# Patient Record
Sex: FEMALE | Race: WHITE | NOT HISPANIC OR LATINO
[De-identification: names, ages, dates, MRNs, and addresses within clinical notes are randomized per-mention and may not be internally consistent; named-entity substitution may affect disease eponyms.]

---

## 2017-08-29 ENCOUNTER — APPOINTMENT (OUTPATIENT)
Dept: OBGYN | Facility: CLINIC | Age: 57
End: 2017-08-29
Payer: COMMERCIAL

## 2017-08-29 PROCEDURE — 76830 TRANSVAGINAL US NON-OB: CPT

## 2017-08-31 ENCOUNTER — APPOINTMENT (OUTPATIENT)
Dept: OBGYN | Facility: CLINIC | Age: 57
End: 2017-08-31
Payer: COMMERCIAL

## 2017-08-31 VITALS
WEIGHT: 160 LBS | DIASTOLIC BLOOD PRESSURE: 82 MMHG | RESPIRATION RATE: 16 BRPM | BODY MASS INDEX: 30.21 KG/M2 | HEART RATE: 73 BPM | SYSTOLIC BLOOD PRESSURE: 118 MMHG | HEIGHT: 61 IN | OXYGEN SATURATION: 97 %

## 2017-08-31 DIAGNOSIS — N95.2 POSTMENOPAUSAL ATROPHIC VAGINITIS: ICD-10-CM

## 2017-08-31 DIAGNOSIS — Z80.0 FAMILY HISTORY OF MALIGNANT NEOPLASM OF DIGESTIVE ORGANS: ICD-10-CM

## 2017-08-31 DIAGNOSIS — Z13.820 ENCOUNTER FOR SCREENING FOR OSTEOPOROSIS: ICD-10-CM

## 2017-08-31 DIAGNOSIS — Z80.3 FAMILY HISTORY OF MALIGNANT NEOPLASM OF BREAST: ICD-10-CM

## 2017-08-31 PROCEDURE — 99396 PREV VISIT EST AGE 40-64: CPT

## 2017-09-01 ENCOUNTER — APPOINTMENT (OUTPATIENT)
Dept: OBGYN | Facility: CLINIC | Age: 57
End: 2017-09-01

## 2017-09-01 LAB — HPV HIGH+LOW RISK DNA PNL CVX: NEGATIVE

## 2017-09-07 LAB — CYTOLOGY CVX/VAG DOC THIN PREP: NORMAL

## 2018-07-28 ENCOUNTER — MOBILE ON CALL (OUTPATIENT)
Age: 58
End: 2018-07-28

## 2018-07-28 ENCOUNTER — EMERGENCY (EMERGENCY)
Facility: HOSPITAL | Age: 58
LOS: 1 days | Discharge: HOME | End: 2018-07-28
Attending: EMERGENCY MEDICINE
Payer: COMMERCIAL

## 2018-07-28 VITALS
WEIGHT: 145.06 LBS | TEMPERATURE: 98 F | OXYGEN SATURATION: 97 % | RESPIRATION RATE: 22 BRPM | HEIGHT: 61 IN | DIASTOLIC BLOOD PRESSURE: 90 MMHG | HEART RATE: 74 BPM | SYSTOLIC BLOOD PRESSURE: 138 MMHG

## 2018-07-28 LAB
ALBUMIN SERPL ELPH-MCNC: 4.4 G/DL — SIGNIFICANT CHANGE UP (ref 3.3–5)
ALP SERPL-CCNC: 61 U/L — SIGNIFICANT CHANGE UP (ref 40–120)
ALT FLD-CCNC: 33 U/L — SIGNIFICANT CHANGE UP (ref 10–45)
ANION GAP SERPL CALC-SCNC: 12 MMOL/L — SIGNIFICANT CHANGE UP (ref 5–17)
APPEARANCE UR: CLEAR — SIGNIFICANT CHANGE UP
AST SERPL-CCNC: 45 U/L — HIGH (ref 10–40)
BASOPHILS # BLD AUTO: 0 K/UL — SIGNIFICANT CHANGE UP (ref 0–0.2)
BASOPHILS NFR BLD AUTO: 0.3 % — SIGNIFICANT CHANGE UP (ref 0–2)
BILIRUB SERPL-MCNC: 0.5 MG/DL — SIGNIFICANT CHANGE UP (ref 0.2–1.2)
BILIRUB UR-MCNC: NEGATIVE — SIGNIFICANT CHANGE UP
BUN SERPL-MCNC: 14 MG/DL — SIGNIFICANT CHANGE UP (ref 7–23)
CALCIUM SERPL-MCNC: 9 MG/DL — SIGNIFICANT CHANGE UP (ref 8.4–10.5)
CHLORIDE SERPL-SCNC: 102 MMOL/L — SIGNIFICANT CHANGE UP (ref 96–108)
CO2 SERPL-SCNC: 23 MMOL/L — SIGNIFICANT CHANGE UP (ref 22–31)
COLOR SPEC: COLORLESS — SIGNIFICANT CHANGE UP
CREAT SERPL-MCNC: 0.73 MG/DL — SIGNIFICANT CHANGE UP (ref 0.5–1.3)
DIFF PNL FLD: NEGATIVE — SIGNIFICANT CHANGE UP
EOSINOPHIL # BLD AUTO: 0.1 K/UL — SIGNIFICANT CHANGE UP (ref 0–0.5)
EOSINOPHIL NFR BLD AUTO: 1.6 % — SIGNIFICANT CHANGE UP (ref 0–6)
EPI CELLS # UR: SIGNIFICANT CHANGE UP /HPF
GLUCOSE SERPL-MCNC: 105 MG/DL — HIGH (ref 70–99)
GLUCOSE UR QL: NEGATIVE — SIGNIFICANT CHANGE UP
HCT VFR BLD CALC: 38.9 % — SIGNIFICANT CHANGE UP (ref 34.5–45)
HGB BLD-MCNC: 12.1 G/DL — SIGNIFICANT CHANGE UP (ref 11.5–15.5)
KETONES UR-MCNC: NEGATIVE — SIGNIFICANT CHANGE UP
LEUKOCYTE ESTERASE UR-ACNC: NEGATIVE — SIGNIFICANT CHANGE UP
LIDOCAIN IGE QN: 24 U/L — SIGNIFICANT CHANGE UP (ref 7–60)
LYMPHOCYTES # BLD AUTO: 1.5 K/UL — SIGNIFICANT CHANGE UP (ref 1–3.3)
LYMPHOCYTES # BLD AUTO: 23.8 % — SIGNIFICANT CHANGE UP (ref 13–44)
MCHC RBC-ENTMCNC: 20 PG — LOW (ref 27–34)
MCHC RBC-ENTMCNC: 31 GM/DL — LOW (ref 32–36)
MCV RBC AUTO: 64.4 FL — LOW (ref 80–100)
MONOCYTES # BLD AUTO: 0.4 K/UL — SIGNIFICANT CHANGE UP (ref 0–0.9)
MONOCYTES NFR BLD AUTO: 6.1 % — SIGNIFICANT CHANGE UP (ref 2–14)
NEUTROPHILS # BLD AUTO: 4.2 K/UL — SIGNIFICANT CHANGE UP (ref 1.8–7.4)
NEUTROPHILS NFR BLD AUTO: 68.2 % — SIGNIFICANT CHANGE UP (ref 43–77)
NITRITE UR-MCNC: NEGATIVE — SIGNIFICANT CHANGE UP
PH UR: 7 — SIGNIFICANT CHANGE UP (ref 5–8)
PLATELET # BLD AUTO: 195 K/UL — SIGNIFICANT CHANGE UP (ref 150–400)
POTASSIUM SERPL-MCNC: 4.9 MMOL/L — SIGNIFICANT CHANGE UP (ref 3.5–5.3)
POTASSIUM SERPL-SCNC: 4.9 MMOL/L — SIGNIFICANT CHANGE UP (ref 3.5–5.3)
PROT SERPL-MCNC: 6.7 G/DL — SIGNIFICANT CHANGE UP (ref 6–8.3)
PROT UR-MCNC: NEGATIVE — SIGNIFICANT CHANGE UP
RBC # BLD: 6.03 M/UL — HIGH (ref 3.8–5.2)
RBC # FLD: 14.2 % — SIGNIFICANT CHANGE UP (ref 10.3–14.5)
RBC CASTS # UR COMP ASSIST: SIGNIFICANT CHANGE UP /HPF (ref 0–2)
SODIUM SERPL-SCNC: 137 MMOL/L — SIGNIFICANT CHANGE UP (ref 135–145)
SP GR SPEC: 1.01 — LOW (ref 1.01–1.02)
UROBILINOGEN FLD QL: NEGATIVE — SIGNIFICANT CHANGE UP
WBC # BLD: 6.2 K/UL — SIGNIFICANT CHANGE UP (ref 3.8–10.5)
WBC # FLD AUTO: 6.2 K/UL — SIGNIFICANT CHANGE UP (ref 3.8–10.5)
WBC UR QL: SIGNIFICANT CHANGE UP /HPF (ref 0–5)

## 2018-07-28 PROCEDURE — 99218: CPT

## 2018-07-28 PROCEDURE — 74176 CT ABD & PELVIS W/O CONTRAST: CPT | Mod: 26

## 2018-07-28 RX ORDER — SODIUM CHLORIDE 9 MG/ML
1000 INJECTION, SOLUTION INTRAVENOUS ONCE
Qty: 0 | Refills: 0 | Status: COMPLETED | OUTPATIENT
Start: 2018-07-28 | End: 2018-07-28

## 2018-07-28 RX ORDER — SODIUM CHLORIDE 9 MG/ML
1000 INJECTION, SOLUTION INTRAVENOUS
Qty: 0 | Refills: 0 | Status: DISCONTINUED | OUTPATIENT
Start: 2018-07-28 | End: 2018-08-01

## 2018-07-28 RX ORDER — ONDANSETRON 8 MG/1
4 TABLET, FILM COATED ORAL ONCE
Qty: 0 | Refills: 0 | Status: COMPLETED | OUTPATIENT
Start: 2018-07-28 | End: 2018-07-28

## 2018-07-28 RX ORDER — OXYCODONE HYDROCHLORIDE 5 MG/1
10 TABLET ORAL ONCE
Qty: 0 | Refills: 0 | Status: DISCONTINUED | OUTPATIENT
Start: 2018-07-28 | End: 2018-07-28

## 2018-07-28 RX ORDER — ONDANSETRON 8 MG/1
4 TABLET, FILM COATED ORAL EVERY 6 HOURS
Qty: 0 | Refills: 0 | Status: DISCONTINUED | OUTPATIENT
Start: 2018-07-28 | End: 2018-07-28

## 2018-07-28 RX ORDER — METOCLOPRAMIDE HCL 10 MG
10 TABLET ORAL EVERY 6 HOURS
Qty: 0 | Refills: 0 | Status: DISCONTINUED | OUTPATIENT
Start: 2018-07-28 | End: 2018-08-01

## 2018-07-28 RX ORDER — ACETAMINOPHEN 500 MG
1000 TABLET ORAL ONCE
Qty: 0 | Refills: 0 | Status: COMPLETED | OUTPATIENT
Start: 2018-07-28 | End: 2018-07-28

## 2018-07-28 RX ORDER — CEFTRIAXONE 500 MG/1
1 INJECTION, POWDER, FOR SOLUTION INTRAMUSCULAR; INTRAVENOUS ONCE
Qty: 0 | Refills: 0 | Status: COMPLETED | OUTPATIENT
Start: 2018-07-28 | End: 2018-07-29

## 2018-07-28 RX ORDER — SODIUM CHLORIDE 9 MG/ML
1000 INJECTION INTRAMUSCULAR; INTRAVENOUS; SUBCUTANEOUS
Qty: 0 | Refills: 0 | Status: DISCONTINUED | OUTPATIENT
Start: 2018-07-28 | End: 2018-07-29

## 2018-07-28 RX ORDER — ONDANSETRON 8 MG/1
4 TABLET, FILM COATED ORAL EVERY 6 HOURS
Qty: 0 | Refills: 0 | Status: DISCONTINUED | OUTPATIENT
Start: 2018-07-28 | End: 2018-08-01

## 2018-07-28 RX ORDER — CEFTRIAXONE 500 MG/1
1 INJECTION, POWDER, FOR SOLUTION INTRAMUSCULAR; INTRAVENOUS ONCE
Qty: 0 | Refills: 0 | Status: COMPLETED | OUTPATIENT
Start: 2018-07-28 | End: 2018-07-28

## 2018-07-28 RX ADMIN — SODIUM CHLORIDE 125 MILLILITER(S): 9 INJECTION, SOLUTION INTRAVENOUS at 21:13

## 2018-07-28 RX ADMIN — Medication 1 ENEMA: at 09:39

## 2018-07-28 RX ADMIN — Medication 1000 MILLIGRAM(S): at 10:34

## 2018-07-28 RX ADMIN — Medication 20 MILLIGRAM(S): at 10:03

## 2018-07-28 RX ADMIN — ONDANSETRON 4 MILLIGRAM(S): 8 TABLET, FILM COATED ORAL at 10:34

## 2018-07-28 RX ADMIN — SODIUM CHLORIDE 125 MILLILITER(S): 9 INJECTION, SOLUTION INTRAVENOUS at 13:24

## 2018-07-28 RX ADMIN — ONDANSETRON 4 MILLIGRAM(S): 8 TABLET, FILM COATED ORAL at 13:06

## 2018-07-28 RX ADMIN — SODIUM CHLORIDE 125 MILLILITER(S): 9 INJECTION, SOLUTION INTRAVENOUS at 13:06

## 2018-07-28 RX ADMIN — CEFTRIAXONE 100 GRAM(S): 500 INJECTION, POWDER, FOR SOLUTION INTRAMUSCULAR; INTRAVENOUS at 13:06

## 2018-07-28 RX ADMIN — Medication 1000 MILLIGRAM(S): at 09:32

## 2018-07-28 RX ADMIN — SODIUM CHLORIDE 1000 MILLILITER(S): 9 INJECTION, SOLUTION INTRAVENOUS at 09:32

## 2018-07-28 RX ADMIN — SODIUM CHLORIDE 125 MILLILITER(S): 9 INJECTION, SOLUTION INTRAVENOUS at 13:44

## 2018-07-28 RX ADMIN — ONDANSETRON 4 MILLIGRAM(S): 8 TABLET, FILM COATED ORAL at 19:48

## 2018-07-28 RX ADMIN — Medication 400 MILLIGRAM(S): at 09:32

## 2018-07-28 RX ADMIN — Medication 10 MILLIGRAM(S): at 21:25

## 2018-07-28 RX ADMIN — Medication 10 MILLIGRAM(S): at 19:52

## 2018-07-28 RX ADMIN — CEFTRIAXONE 1 GRAM(S): 500 INJECTION, POWDER, FOR SOLUTION INTRAMUSCULAR; INTRAVENOUS at 13:44

## 2018-07-28 NOTE — ED CDU PROVIDER INITIAL DAY NOTE - OBJECTIVE STATEMENT
56 yo female with PMH of nephrolithiasis, IBS, anxiety seen for abdominal pain and constipation x 4 days, with bloating/gas pain with associated nausea/vomiting 3 weeks, and back pain x 3 weeks. She is most concerned about abdominal pain and constipation, seen Dr. Minor for IBS, was instructed to take miralax daily, and fleet enema this morning which did not relieve constipation or abd pain and came in for worsening abd pain. She has had this abd  pain before, is like her usual IBS pain but more intense in nature, 9/1. Abdominal pain is RUQ  constant, nonradiating, has not been relieved with tylenol/ibuprofen, worse with food intake, but pt can tolerate po liquids well. She notes last episode of emesis this am, takes zofran prescribed by GI which relieves nausea. Of note, patient also reports dysuria x 2 weeks and recent treatment for UTI with Levaquin. No fevers/chills. Has f/u with GI on 8/2.    In ED, VSS. Labs unremarkable. UA negative. CT a/p revealing concentric distal rectal wall thickening with associated mild hazy perirectal fat. Findings are suggestive of proctitis, likely due to frequent enema use. No hydro, no stones. Guiac negative. Patient given enema in ED with some relief of constipation. Pt given ceftriaxone IV x1, bentyl, and zofran. Patient sent to CDU for IVF, IV abx, antiemetics, pain control, and re-evaluation.     PCP Nciole (Ames) 56 yo female with PMH of nephrolithiasis, IBS, anxiety seen for abdominal pain and constipation x 4 days, with bloating/gas pain with associated nausea/vomiting 3 weeks, and back pain x 3 weeks. She is most concerned about abdominal pain and constipation, seen Dr. Minor for IBS, was instructed to take miralax daily, and fleet enema this morning which did not relieve constipation or abd pain and came in for worsening abd pain. She has had this abd  pain before, is like her usual IBS pain but more intense in nature, 9/10. Abdominal pain is RUQ  constant, nonradiating, has not been relieved with tylenol/ibuprofen, worse with food intake, but pt can tolerate po liquids well. She notes last episode of emesis this am, takes zofran prescribed by GI which relieves nausea. Of note, patient also reports dysuria x 2 weeks and recent treatment for UTI with Levaquin. No fevers/chills. Has f/u with GI on 8/2.    In ED, VSS. Labs unremarkable. UA negative. CT a/p revealing concentric distal rectal wall thickening with associated mild hazy perirectal fat. Findings are suggestive of proctitis, likely due to frequent enema use. No hydro, no stones. Guiac negative. Patient given enema in ED with some relief of constipation. Pt given ceftriaxone IV x1, bentyl, and zofran. Patient sent to CDU for IVF, IV abx, antiemetics, pain control, and re-evaluation.     PCP Nicole (Lanse)

## 2018-07-28 NOTE — ED PROVIDER NOTE - ATTENDING CONTRIBUTION TO CARE
Attending MD Alcantar:   I personally have seen and examined this patient.  Physician assistant note reviewed and agree on plan of care and except where noted.  See HPI, PE, and MDM for details.

## 2018-07-28 NOTE — ED CDU PROVIDER INITIAL DAY NOTE - DETAILS
Dysuria and back pain; ?Pyelonephritis  -IV ABX  -IVF  -PAIN/FEVER CONTROL  -FAN TOSCANO  -CASE D/W ATTENDING LEONARDA Dysuria and back pain; ?Pyelonephritis  -IV ABX  -IVF  -PAIN CONTROL  -NAUSEA CONTROL  -FREQ EVAL  -CASE D/W ATTENDING LEONARDA

## 2018-07-28 NOTE — ED PROVIDER NOTE - MEDICAL DECISION MAKING DETAILS
Attending MD Alcantar: 57F with IBS presenting with 2-3 weeks of daily nausea, abd pain, constipation. Passing flatus, doubt SBO, colitis or acute bowel pathology. Overall suspect IBS related symptoms, will attempt symptom control with IV fluids, bentyl, enema for constipation. Will consider CT a/p if patient does not improve with these measures and upon serial examinations

## 2018-07-28 NOTE — ED ADULT NURSE NOTE - OBJECTIVE STATEMENT
57 yr old female to ed c/o spasmodic abd pain has h/o IBS and has not had BM since Thursday. Awake alert and orientedx3. Resp even and nonlab Denies chest pain or SOB.

## 2018-07-28 NOTE — ED PROVIDER NOTE - OBJECTIVE STATEMENT
58 yo female with PMH of IBS, anxiety seen for abdominal pain and constipation x 4 days, with bloating/gas pain with associated nausea/vomiting 3 weeks, and back pain x 3 weeks. She is most concerned about abdominal pain and constipation, seed Dr. Minor for IBS, was instructed to take miralax daily, and fleet enema this morning which did not relieve constipation or abd pain and came in for worsening abd pain. She has had this abd  pain before, is like her usual IBS pain but more intense in nature, 9/1. Abdominal pain is RUQ  constant, nonradiating, has not been relieved with tylenol/ibuprofen, worse with food intake, but pt can tolerate po liquids well. She notes last episode of emesis this am, takes zofran prescribed by GI which relieves nausea. 58 yo female with PMH of IBS, anxiety seen for abdominal pain and constipation x 4 days, with bloating/gas pain with associated nausea/vomiting 3 weeks, and back pain x 3 weeks. She is most concerned about abdominal pain and constipation, seed Dr. Minor for IBS, was instructed to take miralax daily, and fleet enema this morning which did not relieve constipation or abd pain and came in for worsening abd pain. She has had this abd  pain before, is like her usual IBS pain but more intense in nature, 9/1. Abdominal pain is RUQ  constant, nonradiating, has not been relieved with tylenol/ibuprofen, worse with food intake, but pt can tolerate po liquids well. She notes last episode of emesis this am, takes zofran prescribed by GI which relieves nausea. no fevers/chills.

## 2018-07-28 NOTE — ED CDU PROVIDER INITIAL DAY NOTE - PROGRESS NOTE DETAILS
Patient asleep, resting comfortably. NAD. VSS. JOSH Multani: Patient stated she has epigastric cramping abdominal pain. Will provide Dicyclomine and reassess JOSH Multani: Reassessed patient and reported abdominal pain now improved however does feel slightly nauseous. Will provide zofran

## 2018-07-28 NOTE — ED PROVIDER NOTE - PHYSICAL EXAMINATION
Attending MD Alcantar: A & O x 3, appears uncomfortable, tearful, EOMI b/l, PERRL b/l; lungs CTAB, heart with reg rhythm without murmur; abdomen soft, moderate distention, mild ttp diffusely however distractible ttp, no rebound or guarding; extremities with no edema; affect appropriate. neuro exam non focal with no motor or sensory deficits.

## 2018-07-28 NOTE — ED PROVIDER NOTE - PROGRESS NOTE DETAILS
Attending MD Alcantar: patient still with right back and groin pain, +nausea. Benign abdomen. consider ureteral colic now vs. pyelo. Plan for CT noncon to eval, UA given and pending. JOSH Multani: Patient seen at bedside in NAD.  VSS.  Patient resting comfortably however does note right sided headache rated 4/10 in severity but patient denied neuro symptoms including slurred speech, weakness, photophobia, blurry vision, tinnitus. No events noted over tele. Will provided Tylenol [id number] and reassess JOSH Multani: MRI scheduled made team aware will be taking pt for MRI tonight

## 2018-07-29 ENCOUNTER — MOBILE ON CALL (OUTPATIENT)
Age: 58
End: 2018-07-29

## 2018-07-29 VITALS
SYSTOLIC BLOOD PRESSURE: 113 MMHG | HEART RATE: 63 BPM | DIASTOLIC BLOOD PRESSURE: 73 MMHG | OXYGEN SATURATION: 98 % | TEMPERATURE: 99 F | RESPIRATION RATE: 16 BRPM

## 2018-07-29 LAB
CULTURE RESULTS: NO GROWTH — SIGNIFICANT CHANGE UP
SPECIMEN SOURCE: SIGNIFICANT CHANGE UP

## 2018-07-29 PROCEDURE — 74176 CT ABD & PELVIS W/O CONTRAST: CPT

## 2018-07-29 PROCEDURE — 96376 TX/PRO/DX INJ SAME DRUG ADON: CPT

## 2018-07-29 PROCEDURE — 81001 URINALYSIS AUTO W/SCOPE: CPT

## 2018-07-29 PROCEDURE — 96366 THER/PROPH/DIAG IV INF ADDON: CPT

## 2018-07-29 PROCEDURE — 99217: CPT

## 2018-07-29 PROCEDURE — 99284 EMERGENCY DEPT VISIT MOD MDM: CPT | Mod: 25

## 2018-07-29 PROCEDURE — 96365 THER/PROPH/DIAG IV INF INIT: CPT

## 2018-07-29 PROCEDURE — 96367 TX/PROPH/DG ADDL SEQ IV INF: CPT

## 2018-07-29 PROCEDURE — 82272 OCCULT BLD FECES 1-3 TESTS: CPT

## 2018-07-29 PROCEDURE — 87086 URINE CULTURE/COLONY COUNT: CPT

## 2018-07-29 PROCEDURE — 83690 ASSAY OF LIPASE: CPT

## 2018-07-29 PROCEDURE — 85027 COMPLETE CBC AUTOMATED: CPT

## 2018-07-29 PROCEDURE — G0378: CPT

## 2018-07-29 PROCEDURE — 96375 TX/PRO/DX INJ NEW DRUG ADDON: CPT

## 2018-07-29 PROCEDURE — 80053 COMPREHEN METABOLIC PANEL: CPT

## 2018-07-29 RX ORDER — SIMETHICONE 80 MG/1
80 TABLET, CHEWABLE ORAL EVERY 12 HOURS
Qty: 0 | Refills: 0 | Status: DISCONTINUED | OUTPATIENT
Start: 2018-07-29 | End: 2018-08-01

## 2018-07-29 RX ORDER — ONDANSETRON 8 MG/1
1 TABLET, FILM COATED ORAL
Qty: 9 | Refills: 0 | OUTPATIENT
Start: 2018-07-29 | End: 2018-07-31

## 2018-07-29 RX ORDER — ACETAMINOPHEN 500 MG
975 TABLET ORAL ONCE
Qty: 0 | Refills: 0 | Status: COMPLETED | OUTPATIENT
Start: 2018-07-29 | End: 2018-07-29

## 2018-07-29 RX ORDER — CEFTRIAXONE 500 MG/1
1 INJECTION, POWDER, FOR SOLUTION INTRAMUSCULAR; INTRAVENOUS EVERY 12 HOURS
Qty: 0 | Refills: 0 | Status: DISCONTINUED | OUTPATIENT
Start: 2018-07-29 | End: 2018-08-01

## 2018-07-29 RX ORDER — CEFUROXIME AXETIL 250 MG
1 TABLET ORAL
Qty: 14 | Refills: 0 | OUTPATIENT
Start: 2018-07-29 | End: 2018-08-04

## 2018-07-29 RX ADMIN — SIMETHICONE 80 MILLIGRAM(S): 80 TABLET, CHEWABLE ORAL at 05:10

## 2018-07-29 RX ADMIN — CEFTRIAXONE 1 GRAM(S): 500 INJECTION, POWDER, FOR SOLUTION INTRAMUSCULAR; INTRAVENOUS at 02:05

## 2018-07-29 RX ADMIN — Medication 10 MILLIGRAM(S): at 09:33

## 2018-07-29 RX ADMIN — CEFTRIAXONE 100 GRAM(S): 500 INJECTION, POWDER, FOR SOLUTION INTRAMUSCULAR; INTRAVENOUS at 01:04

## 2018-07-29 RX ADMIN — Medication 975 MILLIGRAM(S): at 10:13

## 2018-07-29 RX ADMIN — SODIUM CHLORIDE 125 MILLILITER(S): 9 INJECTION, SOLUTION INTRAVENOUS at 05:10

## 2018-07-29 NOTE — ED CDU PROVIDER SUBSEQUENT DAY NOTE - PROGRESS NOTE DETAILS
JOSH Multani: Patient seen at bedside in NAD.  VSS.  Patient seen sleeping at the bedside JOSH Multani: Patient seen at bedside in NAD.  VSS.  Patient seen sleeping at the bedside. Patient has not been nauseous or vomiting overnight Patient seen and evaluated at bedside, NAD. Reports she is feeling better than yesterday. Reports some abd cramping that improved Patient seen and evaluated at bedside, NAD. Reports she is feeling better than yesterday. Reports some abd cramping that improved with bentyl yesterday. Tolerated fluids and pudding overnight. Also reports dysuria yesterday that has since resolved. Denies any fever/chills, n/v. VSS. Will continue to monitor, likely d/c home this AM. Patient evaluated by prasad Anne for d/c home. Will attempt to contact patient's GI per patient request regarding ED course. Will give short course bentyl rx. Spoke with Dr. Carlton. Ok with d/c plan, will f/u with patient in office. Attn- pt feeling better.  no fever. less abdo and back pain.  no n/v.  Pt tolerated PO well.  Pt to f/u with her  GI Dr. Carlton.  Pt stable for d/c.

## 2018-07-29 NOTE — ED CDU PROVIDER DISPOSITION NOTE - PLAN OF CARE
(1) You will need to follow-up with your PMD in 2-3 days for your pyelonephritis and bring copy of your results were given with you to bring to your appt. Also, follow up with your gastroenterologist for your IBS  (2) Be sure to review attached discharge paperwork.  (3) Drink plenty of fluids to stay hydrated and avoid skipping meals  (4) Continue your home medications as directed ALONG WITH your antibiotic _________________. Take Zofran every 6 hours as needed for nausea   (5) Return to ER for uncontrolled fever, severe pain, trouble keeping down fluids, spread of infection or any other concerns. (1) You will need to follow-up with your PMD in 2-3 days for further evaluation and bring copy of your results were given with you to bring to your appt. Also, follow up with your gastroenterologist, Dr. Carlton, for your IBS.  (2) Be sure to review attached discharge paperwork.  (3) Drink plenty of fluids to stay hydrated.  (4) Continue your home medications as directed ALONG WITH: your antibiotic Ceftin twice daily for 7 days. Take Zofran every 6 hours as needed for nausea. Take bentyl as needed up to three times daily for abdominal cramping, CAUTION constipation is a side effect*** Be sure to take Miralax to regulate bowel movements if taking.  (5) Return to ER for fever, severe/worsening pain, trouble keeping down fluids, difficulty urinating, or any other concerns.

## 2018-07-29 NOTE — ED ADULT NURSE REASSESSMENT NOTE - COMFORT CARE
plan of care explained
ambulated to bathroom/darkened lights/repositioned/warm blanket provided/plan of care explained

## 2018-07-29 NOTE — ED CDU PROVIDER DISPOSITION NOTE - CLINICAL COURSE
56 yo female with PMH of nephrolithiasis, IBS, anxiety seen for abdominal pain and constipation x 4 days, with bloating/gas pain with associated nausea/vomiting 3 weeks, and back pain x 3 weeks. She is most concerned about abdominal pain and constipation, seen Dr. Minor for IBS, was instructed to take miralax daily, and fleet enema this morning which did not relieve constipation or abd pain and came in for worsening abd pain. She has had this abd  pain before, is like her usual IBS pain but more intense in nature, 9/10. Abdominal pain is RUQ  constant, nonradiating, has not been relieved with tylenol/ibuprofen, worse with food intake, but pt can tolerate po liquids well. She notes last episode of emesis this am, takes zofran prescribed by GI which relieves nausea. Of note, patient also reports dysuria x 2 weeks and recent treatment for UTI with Levaquin. No fevers/chills. Has f/u with GI on 8/2.    	In ED, VSS. Labs unremarkable. UA negative. CT a/p revealing concentric distal rectal wall thickening with associated mild hazy perirectal fat. Findings are suggestive of proctitis, likely due to frequent enema use. No hydro, no stones. Guiac negative. Patient given enema in ED with some relief of constipation. Pt given ceftriaxone IV x1, bentyl, and zofran. Patient sent to CDU for IVF, IV abx, antiemetics, pain control, and re-evaluation. Patient remained stable while in CDU and was able to tolerate PO. Patient safe to be discharged home with PO abx and zofran 56 yo female with PMH of nephrolithiasis, IBS, anxiety seen for abdominal pain and constipation x 4 days, with bloating/gas pain with associated nausea/vomiting 3 weeks, and back pain x 3 weeks. She is most concerned about abdominal pain and constipation, seen Dr. Minor for IBS, was instructed to take miralax daily, and fleet enema this morning which did not relieve constipation or abd pain and came in for worsening abd pain. She has had this abd  pain before, is like her usual IBS pain but more intense in nature, 9/10. Abdominal pain is RUQ  constant, nonradiating, has not been relieved with tylenol/ibuprofen, worse with food intake, but pt can tolerate po liquids well. She notes last episode of emesis this am, takes zofran prescribed by GI which relieves nausea. Of note, patient also reports dysuria x 2 weeks and recent treatment for UTI with Levaquin. No fevers/chills. Has f/u with GI on 8/2.    	In ED, VSS. Labs unremarkable. UA negative. CT a/p revealing concentric distal rectal wall thickening with associated mild hazy perirectal fat. Findings are suggestive of proctitis, likely due to frequent enema use. No hydro, no stones. Guiac negative. Patient given enema in ED with some relief of constipation. Pt given ceftriaxone IV x1, bentyl, and zofran. Patient sent to CDU for IVF, IV abx, antiemetics, pain control, and re-evaluation. Patient remained stable while in CDU and was able to tolerate PO. Abdominal pain and dysuria improved. Patient safe to be discharged home with PO abx, zofran, and short course bentyl. 58 yo female with PMH of nephrolithiasis, IBS, anxiety seen for abdominal pain and constipation x 4 days, with bloating/gas pain with associated nausea/vomiting 3 weeks, and back pain x 3 weeks. She is most concerned about abdominal pain and constipation, seen Dr. Minor for IBS, was instructed to take miralax daily, and fleet enema this morning which did not relieve constipation or abd pain and came in for worsening abd pain. She has had this abd  pain before, is like her usual IBS pain but more intense in nature, 9/10. Abdominal pain is RUQ  constant, nonradiating, has not been relieved with tylenol/ibuprofen, worse with food intake, but pt can tolerate po liquids well. She notes last episode of emesis this am, takes zofran prescribed by GI which relieves nausea. Of note, patient also reports dysuria x 2 weeks and recent treatment for UTI with Levaquin. No fevers/chills. Has f/u with GI on 8/2.    	In ED, VSS. Labs unremarkable. UA negative. CT a/p revealing concentric distal rectal wall thickening with associated mild hazy perirectal fat. Findings are suggestive of proctitis, likely due to frequent enema use. No hydro, no stones. Guiac negative. Patient given enema in ED with some relief of constipation. Pt given ceftriaxone IV x1, bentyl, and zofran. Patient sent to CDU for IVF, IV abx, antiemetics, pain control, and re-evaluation. Patient remained stable while in CDU and was able to tolerate PO. Abdominal pain and dysuria improved. Patient safe to be discharged home with PO abx, zofran, and short course bentyl. Discussed with patient's GI.

## 2018-07-29 NOTE — ED ADULT NURSE REASSESSMENT NOTE - NS ED NURSE REASSESS COMMENT FT1
To cdu denies pain
Pt report received from Lois LAM. Pt transferred to cdu bed 6 for IV antibiotics for pylonephritis. Pt a/ox3 denies respiratory distress, sob, dyspnea, C/P, palpitations, n/v/d at this time. VSS, afebrile, IV clean/dry/intact. Pt aware of plan of care, call bell within reach ,safety maintained. Will monitor and assess.
on toilet. Small BM noted Zofran given for nausea.
report taken from Courtney LAM. pt to receive Ceftriaxone 1 gram at 1300. will continue to monitor.
Received pt from GENARO Jean Baptiste, received pt alert and responsive, oriented x4, denies any respiratory distress, SOB, or difficulty breathing. Pt transferred to CDU for nausea, vomiting and back pain, will medicate for pain as ordered as pt is c/o 7/10 pain to abdomin and back. Received pt with IV fluids infusing as ordered, pt tolerating well. IV in place, patent and free of signs of infiltration, pending IV Rocephin at 0100 pt aware. V/S stable, pt afebrile,  Pt educated on unit and unit rules, instructed patient to notify RN of any needed assistance, Pt verbalizes understanding, Call bell placed within reach. Safety maintained. Will continue to monitor.

## 2018-07-29 NOTE — ED ADULT NURSE REASSESSMENT NOTE - GENERAL PATIENT STATE
comfortable appearance/cooperative/improvement verbalized
smiling/interactive/comfortable appearance/cooperative/resting/sleeping

## 2018-07-30 PROBLEM — N95.2 POSTMENOPAUSAL ATROPHIC VAGINITIS: Status: ACTIVE | Noted: 2017-08-31

## 2018-08-02 ENCOUNTER — INPATIENT (INPATIENT)
Facility: HOSPITAL | Age: 58
LOS: 4 days | Discharge: HOME | DRG: 392 | End: 2018-08-07
Attending: INTERNAL MEDICINE | Admitting: INTERNAL MEDICINE
Payer: COMMERCIAL

## 2018-08-02 ENCOUNTER — APPOINTMENT (OUTPATIENT)
Dept: GASTROENTEROLOGY | Facility: CLINIC | Age: 58
End: 2018-08-02
Payer: COMMERCIAL

## 2018-08-02 VITALS
HEART RATE: 73 BPM | RESPIRATION RATE: 16 BRPM | OXYGEN SATURATION: 96 % | SYSTOLIC BLOOD PRESSURE: 109 MMHG | DIASTOLIC BLOOD PRESSURE: 75 MMHG

## 2018-08-02 VITALS
HEIGHT: 69 IN | BODY MASS INDEX: 25.03 KG/M2 | DIASTOLIC BLOOD PRESSURE: 79 MMHG | HEART RATE: 55 BPM | SYSTOLIC BLOOD PRESSURE: 110 MMHG | WEIGHT: 169 LBS

## 2018-08-02 DIAGNOSIS — K52.9 NONINFECTIVE GASTROENTERITIS AND COLITIS, UNSPECIFIED: ICD-10-CM

## 2018-08-02 DIAGNOSIS — Z83.71 FAMILY HISTORY OF COLONIC POLYPS: ICD-10-CM

## 2018-08-02 DIAGNOSIS — Z80.0 FAMILY HISTORY OF MALIGNANT NEOPLASM OF DIGESTIVE ORGANS: ICD-10-CM

## 2018-08-02 LAB
ALBUMIN SERPL ELPH-MCNC: 4.5 G/DL — SIGNIFICANT CHANGE UP (ref 3.3–5)
ALP SERPL-CCNC: 61 U/L — SIGNIFICANT CHANGE UP (ref 40–120)
ALT FLD-CCNC: 25 U/L — SIGNIFICANT CHANGE UP (ref 10–45)
ANION GAP SERPL CALC-SCNC: 15 MMOL/L — SIGNIFICANT CHANGE UP (ref 5–17)
APPEARANCE UR: CLEAR — SIGNIFICANT CHANGE UP
AST SERPL-CCNC: 24 U/L — SIGNIFICANT CHANGE UP (ref 10–40)
BACTERIA # UR AUTO: NEGATIVE /HPF — SIGNIFICANT CHANGE UP
BASOPHILS # BLD AUTO: 0 K/UL — SIGNIFICANT CHANGE UP (ref 0–0.2)
BASOPHILS NFR BLD AUTO: 0.6 % — SIGNIFICANT CHANGE UP (ref 0–2)
BILIRUB SERPL-MCNC: 0.5 MG/DL — SIGNIFICANT CHANGE UP (ref 0.2–1.2)
BILIRUB UR-MCNC: NEGATIVE — SIGNIFICANT CHANGE UP
BUN SERPL-MCNC: 14 MG/DL — SIGNIFICANT CHANGE UP (ref 7–23)
CALCIUM SERPL-MCNC: 9.6 MG/DL — SIGNIFICANT CHANGE UP (ref 8.4–10.5)
CHLORIDE SERPL-SCNC: 106 MMOL/L — SIGNIFICANT CHANGE UP (ref 96–108)
CO2 SERPL-SCNC: 23 MMOL/L — SIGNIFICANT CHANGE UP (ref 22–31)
COLOR SPEC: YELLOW — SIGNIFICANT CHANGE UP
CREAT SERPL-MCNC: 0.96 MG/DL — SIGNIFICANT CHANGE UP (ref 0.5–1.3)
DIFF PNL FLD: NEGATIVE — SIGNIFICANT CHANGE UP
EOSINOPHIL # BLD AUTO: 0.1 K/UL — SIGNIFICANT CHANGE UP (ref 0–0.5)
EOSINOPHIL NFR BLD AUTO: 0.9 % — SIGNIFICANT CHANGE UP (ref 0–6)
EPI CELLS # UR: NEGATIVE /HPF — SIGNIFICANT CHANGE UP
GAS PNL BLDV: SIGNIFICANT CHANGE UP
GLUCOSE SERPL-MCNC: 91 MG/DL — SIGNIFICANT CHANGE UP (ref 70–99)
GLUCOSE UR QL: NEGATIVE — SIGNIFICANT CHANGE UP
HCG UR QL: NEGATIVE — SIGNIFICANT CHANGE UP
HCT VFR BLD CALC: 37 % — SIGNIFICANT CHANGE UP (ref 34.5–45)
HGB BLD-MCNC: 12.1 G/DL — SIGNIFICANT CHANGE UP (ref 11.5–15.5)
KETONES UR-MCNC: NEGATIVE — SIGNIFICANT CHANGE UP
LEUKOCYTE ESTERASE UR-ACNC: NEGATIVE — SIGNIFICANT CHANGE UP
LYMPHOCYTES # BLD AUTO: 1.2 K/UL — SIGNIFICANT CHANGE UP (ref 1–3.3)
LYMPHOCYTES # BLD AUTO: 14.8 % — SIGNIFICANT CHANGE UP (ref 13–44)
MCHC RBC-ENTMCNC: 20.8 PG — LOW (ref 27–34)
MCHC RBC-ENTMCNC: 32.8 GM/DL — SIGNIFICANT CHANGE UP (ref 32–36)
MCV RBC AUTO: 63.2 FL — LOW (ref 80–100)
MONOCYTES # BLD AUTO: 0.4 K/UL — SIGNIFICANT CHANGE UP (ref 0–0.9)
MONOCYTES NFR BLD AUTO: 5 % — SIGNIFICANT CHANGE UP (ref 2–14)
NEUTROPHILS # BLD AUTO: 6.2 K/UL — SIGNIFICANT CHANGE UP (ref 1.8–7.4)
NEUTROPHILS NFR BLD AUTO: 78.7 % — HIGH (ref 43–77)
NITRITE UR-MCNC: NEGATIVE — SIGNIFICANT CHANGE UP
PH UR: 7 — SIGNIFICANT CHANGE UP (ref 5–8)
PLATELET # BLD AUTO: 220 K/UL — SIGNIFICANT CHANGE UP (ref 150–400)
POTASSIUM SERPL-MCNC: 4.1 MMOL/L — SIGNIFICANT CHANGE UP (ref 3.5–5.3)
POTASSIUM SERPL-SCNC: 4.1 MMOL/L — SIGNIFICANT CHANGE UP (ref 3.5–5.3)
PROT SERPL-MCNC: 7 G/DL — SIGNIFICANT CHANGE UP (ref 6–8.3)
PROT UR-MCNC: NEGATIVE — SIGNIFICANT CHANGE UP
RBC # BLD: 5.84 M/UL — HIGH (ref 3.8–5.2)
RBC # FLD: 13.4 % — SIGNIFICANT CHANGE UP (ref 10.3–14.5)
RBC CASTS # UR COMP ASSIST: SIGNIFICANT CHANGE UP /HPF (ref 0–2)
SODIUM SERPL-SCNC: 144 MMOL/L — SIGNIFICANT CHANGE UP (ref 135–145)
SP GR SPEC: 1.01 — LOW (ref 1.01–1.02)
UROBILINOGEN FLD QL: NEGATIVE — SIGNIFICANT CHANGE UP
WBC # BLD: 7.9 K/UL — SIGNIFICANT CHANGE UP (ref 3.8–10.5)
WBC # FLD AUTO: 7.9 K/UL — SIGNIFICANT CHANGE UP (ref 3.8–10.5)
WBC UR QL: SIGNIFICANT CHANGE UP /HPF (ref 0–5)

## 2018-08-02 PROCEDURE — 74177 CT ABD & PELVIS W/CONTRAST: CPT | Mod: 26

## 2018-08-02 PROCEDURE — 99215 OFFICE O/P EST HI 40 MIN: CPT

## 2018-08-02 PROCEDURE — 99285 EMERGENCY DEPT VISIT HI MDM: CPT

## 2018-08-02 PROCEDURE — 82274 ASSAY TEST FOR BLOOD FECAL: CPT | Mod: QW

## 2018-08-02 RX ORDER — SODIUM CHLORIDE 9 MG/ML
1000 INJECTION INTRAMUSCULAR; INTRAVENOUS; SUBCUTANEOUS
Qty: 0 | Refills: 0 | Status: DISCONTINUED | OUTPATIENT
Start: 2018-08-02 | End: 2018-08-07

## 2018-08-02 RX ORDER — GLYCERIN 1 %
1 DROPS OPHTHALMIC (EYE)
Qty: 0 | Refills: 0 | COMMUNITY

## 2018-08-02 RX ORDER — SENNA PLUS 8.6 MG/1
2 TABLET ORAL AT BEDTIME
Qty: 0 | Refills: 0 | Status: DISCONTINUED | OUTPATIENT
Start: 2018-08-02 | End: 2018-08-07

## 2018-08-02 RX ORDER — ONDANSETRON 8 MG/1
4 TABLET, FILM COATED ORAL ONCE
Qty: 0 | Refills: 0 | Status: COMPLETED | OUTPATIENT
Start: 2018-08-02 | End: 2018-08-02

## 2018-08-02 RX ORDER — SODIUM CHLORIDE 9 MG/ML
1000 INJECTION INTRAMUSCULAR; INTRAVENOUS; SUBCUTANEOUS ONCE
Qty: 0 | Refills: 0 | Status: COMPLETED | OUTPATIENT
Start: 2018-08-02 | End: 2018-08-02

## 2018-08-02 RX ORDER — GLYCERIN ADULT
1 SUPPOSITORY, RECTAL RECTAL
Qty: 0 | Refills: 0 | COMMUNITY

## 2018-08-02 RX ORDER — ONDANSETRON 8 MG/1
1 TABLET, FILM COATED ORAL
Qty: 0 | Refills: 0 | COMMUNITY

## 2018-08-02 RX ORDER — DOCUSATE SODIUM 100 MG
100 CAPSULE ORAL
Qty: 0 | Refills: 0 | Status: DISCONTINUED | OUTPATIENT
Start: 2018-08-02 | End: 2018-08-07

## 2018-08-02 RX ORDER — METRONIDAZOLE 500 MG
500 TABLET ORAL EVERY 8 HOURS
Qty: 0 | Refills: 0 | Status: DISCONTINUED | OUTPATIENT
Start: 2018-08-02 | End: 2018-08-03

## 2018-08-02 RX ORDER — MORPHINE SULFATE 50 MG/1
4 CAPSULE, EXTENDED RELEASE ORAL ONCE
Qty: 0 | Refills: 0 | Status: DISCONTINUED | OUTPATIENT
Start: 2018-08-02 | End: 2018-08-02

## 2018-08-02 RX ORDER — POLYETHYLENE GLYCOL 3350 17 G/17G
238 POWDER, FOR SOLUTION ORAL ONCE
Qty: 0 | Refills: 0 | Status: COMPLETED | OUTPATIENT
Start: 2018-08-02 | End: 2018-08-02

## 2018-08-02 RX ORDER — CIPROFLOXACIN LACTATE 400MG/40ML
400 VIAL (ML) INTRAVENOUS EVERY 12 HOURS
Qty: 0 | Refills: 0 | Status: DISCONTINUED | OUTPATIENT
Start: 2018-08-03 | End: 2018-08-03

## 2018-08-02 RX ORDER — POLYETHYLENE GLYCOL 3350 17 G/17G
17 POWDER, FOR SOLUTION ORAL
Qty: 0 | Refills: 0 | Status: DISCONTINUED | OUTPATIENT
Start: 2018-08-02 | End: 2018-08-07

## 2018-08-02 RX ORDER — SIMETHICONE 80 MG/1
80 TABLET, CHEWABLE ORAL ONCE
Qty: 0 | Refills: 0 | Status: COMPLETED | OUTPATIENT
Start: 2018-08-02 | End: 2018-08-02

## 2018-08-02 RX ORDER — METRONIDAZOLE 500 MG
500 TABLET ORAL ONCE
Qty: 0 | Refills: 0 | Status: COMPLETED | OUTPATIENT
Start: 2018-08-02 | End: 2018-08-02

## 2018-08-02 RX ORDER — SODIUM CHLORIDE 9 MG/ML
1000 INJECTION, SOLUTION INTRAVENOUS ONCE
Qty: 0 | Refills: 0 | Status: COMPLETED | OUTPATIENT
Start: 2018-08-02 | End: 2018-08-02

## 2018-08-02 RX ORDER — PANTOPRAZOLE SODIUM 20 MG/1
40 TABLET, DELAYED RELEASE ORAL
Qty: 0 | Refills: 0 | Status: DISCONTINUED | OUTPATIENT
Start: 2018-08-02 | End: 2018-08-07

## 2018-08-02 RX ORDER — ACETAMINOPHEN 500 MG
1000 TABLET ORAL ONCE
Qty: 0 | Refills: 0 | Status: DISCONTINUED | OUTPATIENT
Start: 2018-08-02 | End: 2018-08-03

## 2018-08-02 RX ORDER — LIDOCAINE 4 G/100G
1 CREAM TOPICAL THREE TIMES A DAY
Qty: 0 | Refills: 0 | Status: DISCONTINUED | OUTPATIENT
Start: 2018-08-02 | End: 2018-08-07

## 2018-08-02 RX ORDER — CIPROFLOXACIN LACTATE 400MG/40ML
400 VIAL (ML) INTRAVENOUS ONCE
Qty: 0 | Refills: 0 | Status: COMPLETED | OUTPATIENT
Start: 2018-08-02 | End: 2018-08-02

## 2018-08-02 RX ORDER — METRONIDAZOLE 500 MG
TABLET ORAL
Qty: 0 | Refills: 0 | Status: DISCONTINUED | OUTPATIENT
Start: 2018-08-02 | End: 2018-08-03

## 2018-08-02 RX ORDER — CIPROFLOXACIN LACTATE 400MG/40ML
VIAL (ML) INTRAVENOUS
Qty: 0 | Refills: 0 | Status: DISCONTINUED | OUTPATIENT
Start: 2018-08-02 | End: 2018-08-03

## 2018-08-02 RX ORDER — HEPARIN SODIUM 5000 [USP'U]/ML
5000 INJECTION INTRAVENOUS; SUBCUTANEOUS EVERY 12 HOURS
Qty: 0 | Refills: 0 | Status: DISCONTINUED | OUTPATIENT
Start: 2018-08-02 | End: 2018-08-07

## 2018-08-02 RX ADMIN — MORPHINE SULFATE 4 MILLIGRAM(S): 50 CAPSULE, EXTENDED RELEASE ORAL at 14:37

## 2018-08-02 RX ADMIN — MORPHINE SULFATE 4 MILLIGRAM(S): 50 CAPSULE, EXTENDED RELEASE ORAL at 15:10

## 2018-08-02 RX ADMIN — Medication 200 MILLIGRAM(S): at 18:23

## 2018-08-02 RX ADMIN — LIDOCAINE 1 APPLICATION(S): 4 CREAM TOPICAL at 22:37

## 2018-08-02 RX ADMIN — POLYETHYLENE GLYCOL 3350 238 GRAM(S): 17 POWDER, FOR SOLUTION ORAL at 20:40

## 2018-08-02 RX ADMIN — Medication 100 MILLIGRAM(S): at 21:51

## 2018-08-02 RX ADMIN — ONDANSETRON 4 MILLIGRAM(S): 8 TABLET, FILM COATED ORAL at 14:37

## 2018-08-02 RX ADMIN — SODIUM CHLORIDE 4000 MILLILITER(S): 9 INJECTION, SOLUTION INTRAVENOUS at 16:53

## 2018-08-02 RX ADMIN — SIMETHICONE 80 MILLIGRAM(S): 80 TABLET, CHEWABLE ORAL at 20:37

## 2018-08-02 RX ADMIN — Medication 30 MILLILITER(S): at 22:45

## 2018-08-02 RX ADMIN — Medication 100 MILLIGRAM(S): at 22:45

## 2018-08-02 RX ADMIN — SENNA PLUS 2 TABLET(S): 8.6 TABLET ORAL at 21:50

## 2018-08-02 RX ADMIN — SODIUM CHLORIDE 1000 MILLILITER(S): 9 INJECTION INTRAMUSCULAR; INTRAVENOUS; SUBCUTANEOUS at 14:37

## 2018-08-02 RX ADMIN — SODIUM CHLORIDE 100 MILLILITER(S): 9 INJECTION INTRAMUSCULAR; INTRAVENOUS; SUBCUTANEOUS at 18:23

## 2018-08-02 RX ADMIN — Medication 1 ENEMA: at 18:22

## 2018-08-02 RX ADMIN — HEPARIN SODIUM 5000 UNIT(S): 5000 INJECTION INTRAVENOUS; SUBCUTANEOUS at 21:50

## 2018-08-02 NOTE — ED PROVIDER NOTE - MEDICAL DECISION MAKING DETAILS
Pt is a 58 yo female with hx of IBS, gerd who P/W CC of abdominal pain. Has proctatis, has had no significant bowel movements since last saturday, concern for sterachoalitis will get CT A/P W/ IV contrast. Bladder scan found ~500 ml of urine, will do begum cath at this time. If CT A/P is negative, will attempt to manually disimpact.

## 2018-08-02 NOTE — ED ADULT NURSE NOTE - OBJECTIVE STATEMENT
57 year old female patient BIBA c/o abd pain, constipation, nausea and flank pain. Patient seen at this ED on 7/28 for similar complaints and went to CDU and had BM on Saturday with some relief of pain. Patient states today she was able to eat solid food and started having abd cramping and rectal pressure. Patient states she feels like she's constipated and feels like she has stool that will not pass and has periods of . Patient states she was sitting on the toilet and was 57 year old female patient BIBA c/o worsening lower abd pain, constipation, nausea, chills and flank pain x 3 weeks. Patient started on PO Levaquin by urologist for "kidney infection" and reports constipation since starting abx.  Patient seen at this ED on 7/28 for similar complaints and went to CDU and had BM on Saturday with some relief of pain. Patient states today she was able to eat solid food and started having abd cramping and rectal pressure. Patient states she feels like she's constipated and feels like she has stool that will not pass and notices some spots of blood in stool. Patient reports attempting tap water enema and glycerin suppository this morning with little relief of pain. Patient endorses R flank pain and nausea- took zofran this morning with little relief of nausea. Pt had one episode of vomiting prior to arrival. Pt also states she feels like she cannot urinate due to constipation - last able to void this morning. Pt reports burning at urethra. Pt sitting on toilet and unable to urinate or have BM. MD at bedside for evaluation Patient and family aware of plan of care.

## 2018-08-02 NOTE — PATIENT PROFILE ADULT. - NS TRANSFER PATIENT BELONGINGS
Cell Phone/PDA (specify)/Electronic Device (specify)/headphones, , earrings, ring/Jewelry/Money (specify)/Clothing

## 2018-08-02 NOTE — ED ADULT NURSE NOTE - NSIMPLEMENTINTERV_GEN_ALL_ED
Implemented All Universal Safety Interventions:  Golden to call system. Call bell, personal items and telephone within reach. Instruct patient to call for assistance. Room bathroom lighting operational. Non-slip footwear when patient is off stretcher. Physically safe environment: no spills, clutter or unnecessary equipment. Stretcher in lowest position, wheels locked, appropriate side rails in place.

## 2018-08-02 NOTE — ED PROVIDER NOTE - OBJECTIVE STATEMENT
Pt is a 56 yo female with hx of IBS, gerd who P/W CC of abdominal pain.    Pt initially went to PMD for vomiting and abdominal pain 3 wks ago and was given Levoquin PO for a "kidney infection". These sx remained the same and pt came to ER last saturday and a CT scan of abdomen showed proctatitis attributed due to frequent enemas being given for ~1 month of constipation. Pt was given IV abx and d/c w/ prescrption for PO abx. Pt took these as prescribed. Pt had no bowel movements between last saturday when she came to the ER and yesterday when she passed one small hard ball of stool. While straining on the toilet today, pt had onset of lower abdominal pain, causing her to call EMS and come to the emergency room. Has been urinating normally, no burning on urination. States she has had chills for the past 3 weeks.

## 2018-08-02 NOTE — ED PROVIDER NOTE - CARE PLAN
Assessment and plan of treatment:	Pt is a 58 yo female with hx of IBS, gerd who P/W CC of abdominal pain. Has proctatis, has had no significant bowel movements since last saturday, concern for sterachoalitis will get CT A/P W/ IV contrast. Bladder scan found ~500 ml of urine, will do begum cath at this time. If CT A/P is negative, will attempt to manually disimpact. Principal Discharge DX:	Colitis  Assessment and plan of treatment:	Pt is a 56 yo female with hx of IBS, gerd who P/W CC of abdominal pain. Has proctatis, has had no significant bowel movements since last saturday, concern for sterachoalitis will get CT A/P W/ IV contrast. Bladder scan found ~500 ml of urine, will do begum cath at this time. If CT A/P is negative, will attempt to manually disimpact.  Secondary Diagnosis:	Urinary retention Principal Discharge DX:	Colitis  Secondary Diagnosis:	Urinary retention

## 2018-08-02 NOTE — CONSULT NOTE ADULT - SUBJECTIVE AND OBJECTIVE BOX
Patient is a 57y old  Female who presents with a chief complaint of     HPI:  Pt is a 58 yo female with hx of IBS, gerd who P/W CC of abdominal pain.    	Pt initially went to PMD for vomiting and abdominal pain 3 wks ago and was given Levaquin PO for a  ? "kidney infection".   These sx remained the same and pt came to ER last saturday and a CT scan of abdomen showed proctitis attributed due to frequent enemas being given for ~1 month of constipation.   Pt was given IV abx and d/c w/ script for po abs   . Pt took these as prescribed. Pt had no bowel movements between last saturday when she came to the ER and yesterday when she passed one small hard ball of stool  . While straining on the toilet today, pt had onset of lower abdominal pain, causing her to call EMS and come to the emergency room.   Has been urinating normally, no burning on urination. (02 Aug 2018 18:20)      PAST MEDICAL & SURGICAL HISTORY:  Acid reflux  Gastritis  Panic attack  Anxiety  No significant past surgical history      MEDICATIONS  (STANDING):  ciprofloxacin   IVPB      docusate sodium 100 milliGRAM(s) Oral two times a day  heparin  Injectable 5000 Unit(s) SubCutaneous every 12 hours  metroNIDAZOLE  IVPB      metroNIDAZOLE  IVPB 500 milliGRAM(s) IV Intermittent once  metroNIDAZOLE  IVPB 500 milliGRAM(s) IV Intermittent every 8 hours  pantoprazole    Tablet 40 milliGRAM(s) Oral before breakfast  polyethylene glycol 3350 17 Gram(s) Oral two times a day  senna 2 Tablet(s) Oral at bedtime  sodium chloride 0.9%. 1000 milliLiter(s) (100 mL/Hr) IV Continuous <Continuous>      Allergies    Advil (Anaphylaxis)  ibuprofen (Hives)  lactose (Diarrhea)  sulfa drugs (Anaphylaxis)    Intolerances        SOCIAL HISTORY:  Denies ETOh,Smoking,     FAMILY HISTORY:      REVIEW OF SYSTEMS:    CONSTITUTIONAL: No weakness, fevers or chills  EYES/ENT: No visual changes;  No vertigo or throat pain   NECK: No pain or stiffness  RESPIRATORY: No cough, wheezing, hemoptysis; No shortness of breath  CARDIOVASCULAR: No chest pain or palpitations  GASTROINTESTINAL: No abdominal or epigastric pain. No nausea, vomiting, or hematemesis; No diarrhea or constipation. No melena or hematochezia.  GENITOURINARY: No dysuria, frequency or hematuria  NEUROLOGICAL: No numbness or weakness  SKIN: No itching, burning, rashes, or lesions   All other review of systems is negative unless indicated above.    VITAL:  T(C): , Max: 37.1 (08-02-18 @ 13:45)  T(F): , Max: 98.8 (08-02-18 @ 13:45)  HR: 65 (08-02-18 @ 16:48)  BP: 131/70 (08-02-18 @ 16:48)  BP(mean): --  RR: 16 (08-02-18 @ 16:48)  SpO2: 99% (08-02-18 @ 16:48)  Wt(kg): --    I and O's:    08-02 @ 07:01  -  08-02 @ 19:00  --------------------------------------------------------  IN: 0 mL / OUT: 1700 mL / NET: -1700 mL          PHYSICAL EXAM:    Constitutional: NAD  HEENT: PERRLA,   Neck: No JVD  Respiratory: CTA B/L  Cardiovascular: S1 and S2  Gastrointestinal: BS+, soft, NT/ND  Extremities: No peripheral edema  Neurological: A/O x 3, no focal deficits  Psychiatric: Normal mood, normal affect  : No Olvera  Skin: No rashes  Access: Not applicable  Back: No CVA tenderness    LABS:                        12.1   7.9   )-----------( 220      ( 02 Aug 2018 14:47 )             37.0     08-02    144  |  106  |  14  ----------------------------<  91  4.1   |  23  |  0.96    Ca    9.6      02 Aug 2018 14:47    TPro  7.0  /  Alb  4.5  /  TBili  0.5  /  DBili  x   /  AST  24  /  ALT  25  /  AlkPhos  61  08-02          RADIOLOGY & ADDITIONAL STUDIES:

## 2018-08-02 NOTE — ED PROVIDER NOTE - ABDOMINAL TENDER
left upper quadrant/right lower quadrant/periumbilical/right upper quadrant/left lower quadrant/umbilical/epigastric

## 2018-08-02 NOTE — ED PROVIDER NOTE - PROGRESS NOTE DETAILS
Attending MD Alcantar: pt received in sign out. CT read notable for stool impaction and ?stercoral colitis. Given recurrent ED visits for same and urinary retention, will admit for IV abx for colitis, aggressive bowel regiment

## 2018-08-02 NOTE — ED PROVIDER NOTE - ATTENDING CONTRIBUTION TO CARE
Attending MD Vidal.  Agree with libra. Pt is a 58 yo female with hx of IBS, gerd, freq enemas.  Came to ED with complaint that she 'cannot pass her stool'.  Usual state of health until recently when she was seen on Saturday and had a CT scan dxed with proctitis.  Frequent enemas.  Sent home with abxs/cefuroxime.  Pt saw GI today and was sent home with golytely to prep for outpt colonoscopy.  Pt felt severe pain at home and attempted to defecate but was unable to.  Pt has not passed stool since Saturday.  Pt has been passing urine without difficulty.  Has continued to have back pain since dx with proctitis.  Abdomen has felt progressively more distended.  Endorses chills since kidney infection (pyelo) dx 3 wks ago.  Attempted a warm water enema at home without movement.  No BM since saturday.  Rectal exam c/w stool easily palpable.  GI and ED have both attempted disimpaction wihtout ability to remove stool.  pt endorses diffuse abdominal pain without guarding. Attending MD Vidal.  Agree with libra. Pt is a 56 yo female with hx of IBS, gerd, freq enemas.  Came to ED with complaint that she 'cannot pass her stool'.  Usual state of health until recently when she was seen on Saturday and had a CT scan dxed with proctitis.  Frequent enemas.  Sent home with abxs/cefuroxime.  Pt saw GI today and was sent home with golytely to prep for outpt colonoscopy.  Pt felt severe pain at home and attempted to defecate but was unable to.  Pt has not passed stool since Saturday.  Pt has been passing urine without difficulty.  Has continued to have back pain since dx with proctitis.  Abdomen has felt progressively more distended.  Endorses chills since kidney infection (pyelo) dx 3 wks ago.  Attempted a warm water enema at home without movement.  No BM since saturday.  Rectal exam c/w stool easily palpable.  GI and ED have both attempted disimpaction wihtout ability to remove stool.  pt endorses diffuse abdominal pain without guarding.  Repeat CT ordered for eval for poss obstruction vs. stercoral colitis.

## 2018-08-02 NOTE — ED ADULT NURSE REASSESSMENT NOTE - NS ED NURSE REASSESS COMMENT FT1
Bladder scan shows 427cc retained urine. Begum catheter placed using sterile technique. Second RN present to confirm sterility. Draining to gravity. Secured w/ stat lock. Pt tolerated procedure well. Will cont to monitor. 450cc clear yellow urine draining in begum bag.

## 2018-08-02 NOTE — H&P ADULT - ASSESSMENT
pt w/ abd pain /fecal impaction  sterococal colitis  gi eval  limit abs  bowel regimen as per gi   dvt proph  ppi   iv fluids

## 2018-08-02 NOTE — H&P ADULT - HISTORY OF PRESENT ILLNESS
Pt is a 58 yo female with hx of IBS, gerd who P/W CC of abdominal pain.    	Pt initially went to PMD for vomiting and abdominal pain 3 wks ago and was given Levaquin PO for a  ? "kidney infection".   These sx remained the same and pt came to ER last saturday and a CT scan of abdomen showed proctitis attributed due to frequent enemas being given for ~1 month of constipation.   Pt was given IV abx and d/c w/ script for po abs   . Pt took these as prescribed. Pt had no bowel movements between last saturday when she came to the ER and yesterday when she passed one small hard ball of stool  . While straining on the toilet today, pt had onset of lower abdominal pain, causing her to call EMS and come to the emergency room.   Has been urinating normally, no burning on urination.

## 2018-08-02 NOTE — H&P ADULT - NSHPLABSRESULTS_GEN_ALL_CORE
12.1   7.9   )-----------( 220      ( 02 Aug 2018 14:47 )             37.0       08-02    144  |  106  |  14  ----------------------------<  91  4.1   |  23  |  0.96    Ca    9.6      02 Aug 2018 14:47    TPro  7.0  /  Alb  4.5  /  TBili  0.5  /  DBili  x   /  AST  24  /  ALT  25  /  AlkPhos  61  08-              Urinalysis Basic - ( 02 Aug 2018 15:20 )    Color: Yellow / Appearance: Clear / S.006 / pH: x  Gluc: x / Ketone: Negative  / Bili: Negative / Urobili: Negative   Blood: x / Protein: Negative / Nitrite: Negative   Leuk Esterase: Negative / RBC: 0-2 /HPF / WBC 0-2 /HPF   Sq Epi: x / Non Sq Epi: Negative /HPF / Bacteria: Negative /HPF            Lactate Trend            CAPILLARY BLOOD GLUCOSE            Culture Results:   No growth ( @ 12:16)

## 2018-08-02 NOTE — ED ADULT NURSE REASSESSMENT NOTE - NS ED NURSE REASSESS COMMENT FT1
Patient had large BM and reports improvement in abd cramping. GI MD at bedside for evaluation. Pt received bed assignment. Pt aware. Report given to RN. VSS. Pt stable for transport. Chart given to charge desk. Will cont to monitor.

## 2018-08-02 NOTE — CONSULT NOTE ADULT - ASSESSMENT
pt with stoicoral colitis.  had enema in ed with large bm.  agree with abx. will give large dose of mirlax utnil bm.  will follow with you

## 2018-08-02 NOTE — ED PROVIDER NOTE - NS ED ROS FT
CONSTITUTIONAL: No fevers, no chills  Eyes: normal  Ears: normal  Nose: normal  Mouth/Throat: no sore throat  Cardiovascular: No Chest pain  Respiratory: No SOB  Gastrointestinal: Refer to HPI  Genitourinary: no dysuria, no hematuria  SKIN: no rashes.  NEURO: no headache

## 2018-08-03 ENCOUNTER — TRANSCRIPTION ENCOUNTER (OUTPATIENT)
Age: 58
End: 2018-08-03

## 2018-08-03 LAB
ANION GAP SERPL CALC-SCNC: 11 MMOL/L — SIGNIFICANT CHANGE UP (ref 5–17)
BUN SERPL-MCNC: 9 MG/DL — SIGNIFICANT CHANGE UP (ref 7–23)
CALCIUM SERPL-MCNC: 8.9 MG/DL — SIGNIFICANT CHANGE UP (ref 8.4–10.5)
CHLORIDE SERPL-SCNC: 107 MMOL/L — SIGNIFICANT CHANGE UP (ref 96–108)
CO2 SERPL-SCNC: 26 MMOL/L — SIGNIFICANT CHANGE UP (ref 22–31)
CREAT SERPL-MCNC: 0.86 MG/DL — SIGNIFICANT CHANGE UP (ref 0.5–1.3)
CULTURE RESULTS: NO GROWTH — SIGNIFICANT CHANGE UP
GLUCOSE SERPL-MCNC: 137 MG/DL — HIGH (ref 70–99)
HCT VFR BLD CALC: 33.6 % — LOW (ref 34.5–45)
HGB BLD-MCNC: 10.9 G/DL — LOW (ref 11.5–15.5)
MCHC RBC-ENTMCNC: 20.8 PG — LOW (ref 27–34)
MCHC RBC-ENTMCNC: 32.4 GM/DL — SIGNIFICANT CHANGE UP (ref 32–36)
MCV RBC AUTO: 64.2 FL — LOW (ref 80–100)
PLATELET # BLD AUTO: 160 K/UL — SIGNIFICANT CHANGE UP (ref 150–400)
POTASSIUM SERPL-MCNC: 4 MMOL/L — SIGNIFICANT CHANGE UP (ref 3.5–5.3)
POTASSIUM SERPL-SCNC: 4 MMOL/L — SIGNIFICANT CHANGE UP (ref 3.5–5.3)
RBC # BLD: 5.23 M/UL — HIGH (ref 3.8–5.2)
RBC # FLD: 15.6 % — HIGH (ref 10.3–14.5)
SODIUM SERPL-SCNC: 144 MMOL/L — SIGNIFICANT CHANGE UP (ref 135–145)
SPECIMEN SOURCE: SIGNIFICANT CHANGE UP
WBC # BLD: 6.44 K/UL — SIGNIFICANT CHANGE UP (ref 3.8–10.5)
WBC # FLD AUTO: 6.44 K/UL — SIGNIFICANT CHANGE UP (ref 3.8–10.5)

## 2018-08-03 PROCEDURE — 99254 IP/OBS CNSLTJ NEW/EST MOD 60: CPT

## 2018-08-03 PROCEDURE — 74018 RADEX ABDOMEN 1 VIEW: CPT | Mod: 26

## 2018-08-03 RX ORDER — SENNA PLUS 8.6 MG/1
2 TABLET ORAL
Qty: 0 | Refills: 0 | COMMUNITY
Start: 2018-08-03

## 2018-08-03 RX ORDER — SOD SULF/SODIUM/NAHCO3/KCL/PEG
4000 SOLUTION, RECONSTITUTED, ORAL ORAL ONCE
Qty: 0 | Refills: 0 | Status: COMPLETED | OUTPATIENT
Start: 2018-08-03 | End: 2018-08-03

## 2018-08-03 RX ORDER — ACETAMINOPHEN 500 MG
1000 TABLET ORAL ONCE
Qty: 0 | Refills: 0 | Status: COMPLETED | OUTPATIENT
Start: 2018-08-03 | End: 2018-08-03

## 2018-08-03 RX ORDER — DOCUSATE SODIUM 100 MG
1 CAPSULE ORAL
Qty: 0 | Refills: 0 | COMMUNITY
Start: 2018-08-03

## 2018-08-03 RX ORDER — ACETAMINOPHEN 500 MG
650 TABLET ORAL EVERY 6 HOURS
Qty: 0 | Refills: 0 | Status: DISCONTINUED | OUTPATIENT
Start: 2018-08-03 | End: 2018-08-07

## 2018-08-03 RX ORDER — ONDANSETRON 8 MG/1
4 TABLET, FILM COATED ORAL ONCE
Qty: 0 | Refills: 0 | Status: COMPLETED | OUTPATIENT
Start: 2018-08-03 | End: 2018-08-03

## 2018-08-03 RX ORDER — ACETAMINOPHEN 500 MG
650 TABLET ORAL ONCE
Qty: 0 | Refills: 0 | Status: COMPLETED | OUTPATIENT
Start: 2018-08-03 | End: 2018-08-03

## 2018-08-03 RX ORDER — PANTOPRAZOLE SODIUM 20 MG/1
1 TABLET, DELAYED RELEASE ORAL
Qty: 30 | Refills: 0 | OUTPATIENT
Start: 2018-08-03 | End: 2018-09-01

## 2018-08-03 RX ORDER — POLYETHYLENE GLYCOL 3350 17 G/17G
17 POWDER, FOR SOLUTION ORAL
Qty: 0 | Refills: 0 | COMMUNITY
Start: 2018-08-03

## 2018-08-03 RX ADMIN — HEPARIN SODIUM 5000 UNIT(S): 5000 INJECTION INTRAVENOUS; SUBCUTANEOUS at 10:11

## 2018-08-03 RX ADMIN — PANTOPRAZOLE SODIUM 40 MILLIGRAM(S): 20 TABLET, DELAYED RELEASE ORAL at 06:07

## 2018-08-03 RX ADMIN — Medication 400 MILLIGRAM(S): at 20:31

## 2018-08-03 RX ADMIN — Medication 650 MILLIGRAM(S): at 07:39

## 2018-08-03 RX ADMIN — Medication 10 MILLIGRAM(S): at 13:59

## 2018-08-03 RX ADMIN — ONDANSETRON 4 MILLIGRAM(S): 8 TABLET, FILM COATED ORAL at 21:25

## 2018-08-03 RX ADMIN — Medication 100 MILLIGRAM(S): at 10:11

## 2018-08-03 RX ADMIN — SODIUM CHLORIDE 100 MILLILITER(S): 9 INJECTION INTRAMUSCULAR; INTRAVENOUS; SUBCUTANEOUS at 10:11

## 2018-08-03 RX ADMIN — Medication 650 MILLIGRAM(S): at 15:19

## 2018-08-03 RX ADMIN — SENNA PLUS 2 TABLET(S): 8.6 TABLET ORAL at 21:34

## 2018-08-03 RX ADMIN — HEPARIN SODIUM 5000 UNIT(S): 5000 INJECTION INTRAVENOUS; SUBCUTANEOUS at 21:34

## 2018-08-03 RX ADMIN — Medication 100 MILLIGRAM(S): at 05:08

## 2018-08-03 RX ADMIN — POLYETHYLENE GLYCOL 3350 17 GRAM(S): 17 POWDER, FOR SOLUTION ORAL at 10:11

## 2018-08-03 RX ADMIN — Medication 650 MILLIGRAM(S): at 15:50

## 2018-08-03 RX ADMIN — Medication 1000 MILLIGRAM(S): at 21:41

## 2018-08-03 RX ADMIN — Medication 4000 MILLILITER(S): at 18:20

## 2018-08-03 RX ADMIN — Medication 1 ENEMA: at 16:00

## 2018-08-03 RX ADMIN — Medication 200 MILLIGRAM(S): at 06:09

## 2018-08-03 RX ADMIN — Medication 30 MILLILITER(S): at 21:24

## 2018-08-03 RX ADMIN — Medication 100 MILLIGRAM(S): at 21:34

## 2018-08-03 RX ADMIN — Medication 100 MILLIGRAM(S): at 13:22

## 2018-08-03 NOTE — DIETITIAN INITIAL EVALUATION ADULT. - ENERGY NEEDS
Ht: 61"  Wt: 155  BMI: 29.4 kg/m2   IBW: 105 (+/-10%)     148% IBW  Edema: none   Skin: no pressure injuries Ht: 61"  Wt: 155 (stated wt) BMI: 29.4 kg/m2   IBW: 105 (+/-10%)     148% IBW  Edema: none   Skin: no pressure injuries

## 2018-08-03 NOTE — DISCHARGE NOTE ADULT - CARE PLAN
Principal Discharge DX:	Colitis  Goal:	decrease pain  Assessment and plan of treatment:	colace, senna and miralax  Feet enema  Keep record of bowel movements  Secondary Diagnosis:	Urinary retention  Goal:	Voids with ease  Assessment and plan of treatment:	Resolved  Secondary Diagnosis:	Acid reflux  Goal:	stable  Assessment and plan of treatment:	Continue Protonix  Secondary Diagnosis:	Anxiety  Goal:	stable  Assessment and plan of treatment:	Continue current management Principal Discharge DX:	Colitis  Goal:	decrease pain  Assessment and plan of treatment:	colace, senna and miralax  Feet enema,   Keep record of bowel movements, Antibiotics were discontinued per ID recommendation  Secondary Diagnosis:	Urinary retention  Goal:	Voids with ease  Assessment and plan of treatment:	Resolved  Secondary Diagnosis:	Acid reflux  Goal:	stable  Assessment and plan of treatment:	Continue Protonix  Secondary Diagnosis:	Anxiety  Goal:	stable  Assessment and plan of treatment:	Continue current management Principal Discharge DX:	Colitis  Goal:	decrease pain  Assessment and plan of treatment:	Colace, senna and miralax  Feet enema,   Keep record of bowel movements, Antibiotics were discontinued per ID recommendation  Secondary Diagnosis:	Urinary retention  Goal:	Voids with ease  Assessment and plan of treatment:	Resolved  If symptoms recur please make your PMD aware.  Secondary Diagnosis:	Acid reflux  Goal:	stable  Assessment and plan of treatment:	Continue Protonix  Change the factors that you can control. Ask your caregiver for guidance concerning weight loss, quitting smoking, and alcohol consumption.  Avoid foods and drinks that make your symptoms worse foods.  Avoid lying down for the 3 hours prior to your bedtime or prior to taking a nap.  Eat small, frequent meals instead of large meals.   Wear loose-fitting clothing. Do not wear anything tight around your waist that causes pressure on your stomach. Only take over-the-counter or prescription medicines for pain, discomfort, or fever as directed by your caregiver.   Do not take aspirin, ibuprofen, or other nonsteroidal anti-inflammatory drugs (NSAIDs).  SEEK IMMEDIATE MEDICAL CARE IF:  You have pain in your arms, neck, jaw, teeth, or back.   Your pain increases or changes in intensity or duration.   You develop nausea, vomiting, or sweating (diaphoresis).  You develop shortness of breath, or you faint.  Your vomit is green, yellow, black, or looks like coffee grounds or blood.  Your stool is red, bloody, or black.  These symptoms could be signs of other problems, such as heart disease, gastric bleeding, or esophageal bleeding.  Secondary Diagnosis:	Anxiety  Goal:	stable  Assessment and plan of treatment:	Continue current management Principal Discharge DX:	Colitis  Goal:	decrease pain  Assessment and plan of treatment:	Colace, senna and miralax  Fleet enema,   Keep record of bowel movements, Antibiotics were discontinued per ID recommendation  Secondary Diagnosis:	Urinary retention  Goal:	Voids with ease  Assessment and plan of treatment:	Resolved  If symptoms recur please make your PMD aware.  Secondary Diagnosis:	Acid reflux  Goal:	stable  Assessment and plan of treatment:	Continue Protonix  Change the factors that you can control. Ask your caregiver for guidance concerning weight loss, quitting smoking, and alcohol consumption.  Avoid foods and drinks that make your symptoms worse foods.  Avoid lying down for the 3 hours prior to your bedtime or prior to taking a nap.  Eat small, frequent meals instead of large meals.   Wear loose-fitting clothing. Do not wear anything tight around your waist that causes pressure on your stomach. Only take over-the-counter or prescription medicines for pain, discomfort, or fever as directed by your caregiver.   Do not take aspirin, ibuprofen, or other nonsteroidal anti-inflammatory drugs (NSAIDs).  SEEK IMMEDIATE MEDICAL CARE IF:  You have pain in your arms, neck, jaw, teeth, or back.   Your pain increases or changes in intensity or duration.   You develop nausea, vomiting, or sweating (diaphoresis).  You develop shortness of breath, or you faint.  Your vomit is green, yellow, black, or looks like coffee grounds or blood.  Your stool is red, bloody, or black.  These symptoms could be signs of other problems, such as heart disease, gastric bleeding, or esophageal bleeding.  Secondary Diagnosis:	Anxiety  Goal:	stable  Assessment and plan of treatment:	Continue current management

## 2018-08-03 NOTE — CONSULT NOTE ADULT - ASSESSMENT
Afebrile  WBC WNL  CT A/P with mild stercoral colitis  UA negative 56 yo female with hx of IBS, GERD, nephrolithiasis presented to the ED with abdominal pain.    CT with stercoral colitis  Afebrile  WBC WNL  UA negative  Urologist gave 7 day course of Levaquin 3 weeks ago for UTI  Seen in ED last Sat and given another course of Cipro  Now with begum for retention I suspect from stool burden    Impression: Given recent courses of abx and risk for C. diff with abx, and a negative UA, I have a low suspicion for UTI.   Stercoral colitis    Recommend:  -Can discontinue abx and monitor off abx.  -Stool regimen.  -TOV if patient tolerates

## 2018-08-03 NOTE — DIETITIAN INITIAL EVALUATION ADULT. - OTHER INFO
Pt seen for: consult for poor appetite PTA  Adm dx: sterocoral colitis    GI issues: denies N/V, constipation PTA, had loose BM yesterday (on bowel regimen)   Food allergies: NKFA, lactose intolerant    Vit/supplement PTA: probiotic, vitamin C + D, occasionally takes mvi

## 2018-08-03 NOTE — CONSULT NOTE ADULT - SUBJECTIVE AND OBJECTIVE BOX
HPI:  Pt is a 58 yo female with hx of IBS, gerd who P/W CC of abdominal pain.    	Pt initially went to PMD for vomiting and abdominal pain 3 wks ago and was given Levaquin PO for a  ? "kidney infection".   These sx remained the same and pt came to ER last saturday and a CT scan of abdomen showed proctitis attributed due to frequent enemas being given for ~1 month of constipation.   Pt was given IV abx and d/c w/ script for po abs   . Pt took these as prescribed. Pt had no bowel movements between last saturday when she came to the ER and yesterday when she passed one small hard ball of stool  . While straining on the toilet today, pt had onset of lower abdominal pain, causing her to call EMS and come to the emergency room.   Has been urinating normally, no burning on urination. (02 Aug 2018 18:20)    PAST MEDICAL & SURGICAL HISTORY:  Acid reflux  Gastritis  Panic attack  Anxiety  No significant past surgical history    Allergies    Advil (Anaphylaxis)  ibuprofen (Hives)  lactose (Diarrhea)  sulfa drugs (Anaphylaxis)    Intolerances    ANTIMICROBIALS:  ciprofloxacin   IVPB    ciprofloxacin   IVPB 400 every 12 hours  metroNIDAZOLE  IVPB    metroNIDAZOLE  IVPB 500 every 8 hours    OTHER MEDS:  docusate sodium 100 milliGRAM(s) Oral two times a day  heparin  Injectable 5000 Unit(s) SubCutaneous every 12 hours  lidocaine 5% Ointment 1 Application(s) Topical three times a day PRN  pantoprazole    Tablet 40 milliGRAM(s) Oral before breakfast  polyethylene glycol 3350 17 Gram(s) Oral two times a day  senna 2 Tablet(s) Oral at bedtime  sodium chloride 0.9%. 1000 milliLiter(s) IV Continuous <Continuous>    SOCIAL HISTORY:    FAMILY HISTORY:    Drug Dosing Weight  Height (cm): 154.94 (02 Aug 2018 19:56)  Weight (kg): 84.8 (02 Aug 2018 19:56)  BMI (kg/m2): 35.3 (02 Aug 2018 19:56)  BSA (m2): 1.84 (02 Aug 2018 19:56)    PE:    Vital Signs Last 24 Hrs  T(C): 36.6 (03 Aug 2018 05:54), Max: 37.3 (02 Aug 2018 19:02)  T(F): 97.9 (03 Aug 2018 05:54), Max: 99.2 (02 Aug 2018 19:02)  HR: 54 (03 Aug 2018 05:54) (54 - 73)  BP: 93/60 (03 Aug 2018 05:54) (93/60 - 131/70)  BP(mean): --  RR: 18 (03 Aug 2018 05:54) (16 - 18)  SpO2: 96% (03 Aug 2018 05:54) (96% - 99%)    Gen: AOx3, NAD, non-toxic, pleasant  CV: S1+S2 normal, no murmurs, nontachycardic  Resp: Clear bilat, no resp distress, no crackles/wheezes  Abd: Soft, nontender, +BS  Ext: No LE edema, no wounds  : No Olvera  IV/Skin: No thrombophlebitis  Msk: No low back pain, no arthralgias, no joint swelling  Neuro: No sensory deficits, no motor deficits    LABS:                          12.1   7.9   )-----------( 220      ( 02 Aug 2018 14:47 )             37.0       08-02    144  |  106  |  14  ----------------------------<  91  4.1   |  23  |  0.96    Ca    9.6      02 Aug 2018 14:47    TPro  7.0  /  Alb  4.5  /  TBili  0.5  /  DBili  x   /  AST  24  /  ALT  25  /  AlkPhos  61  08-02      Urinalysis Basic - ( 02 Aug 2018 15:20 )    Color: Yellow / Appearance: Clear / S.006 / pH: x  Gluc: x / Ketone: Negative  / Bili: Negative / Urobili: Negative   Blood: x / Protein: Negative / Nitrite: Negative   Leuk Esterase: Negative / RBC: 0-2 /HPF / WBC 0-2 /HPF   Sq Epi: x / Non Sq Epi: Negative /HPF / Bacteria: Negative /HPF    MICROBIOLOGY:  v  .Urine Clean Catch (Midstream)  18   No growth  --  --    RADIOLOGY:    < from: CT Abdomen and Pelvis w/ IV Cont (18 @ 16:31) >  IMPRESSION:     Rectum distended with stool and  minimal surrounding inflammatory change,   question mild stercoral colitis.    No intra-abdominal abscess. Normal appendix.    < end of copied text > HPI:  Pt is a 56 yo female with hx of IBS, GERD, nephrolithiasis presented to the ED with abdominal pain. She initially went to her urologist approximately 3 wks ago and was given Levaquin PO for a UTI and completed 7 days. She have same abd pain symptoms and came to the ED last Saturday and CT sowing proctitis attributed to frequent enemas being given for ~1 month of constipation. She was also given cipro for UTI and took cipro until admission. Patient states that she has been having constipation since last Saturday, passing only hard and small amounts of stool, with severe straining due to difficulty in stooling. She states that barrett she uses the bathroom, she notices the abd pain worsens with straining. She came to the ED again due to the lower abdominal pain. CT in the ED demonstrating stercoral colitis. Has been urinating normally, states on admission having a difficulties urinating so a begum was placed. She states she might have had burning on urination, but it was more straining sensation. Denies fever, chills. She states she normally gets IBS symptoms with stress, last time was 2 years ago, otherwise, has been stable and not taking anything now for IBS.    PAST MEDICAL & SURGICAL HISTORY:  Acid reflux  Gastritis  Panic attack  Anxiety  No significant past surgical history    Allergies    Advil (Anaphylaxis)  ibuprofen (Hives)  lactose (Diarrhea)  sulfa drugs (Anaphylaxis)    Intolerances    ANTIMICROBIALS:  ciprofloxacin   IVPB    ciprofloxacin   IVPB 400 every 12 hours  metroNIDAZOLE  IVPB    metroNIDAZOLE  IVPB 500 every 8 hours    OTHER MEDS:  docusate sodium 100 milliGRAM(s) Oral two times a day  heparin  Injectable 5000 Unit(s) SubCutaneous every 12 hours  lidocaine 5% Ointment 1 Application(s) Topical three times a day PRN  pantoprazole    Tablet 40 milliGRAM(s) Oral before breakfast  polyethylene glycol 3350 17 Gram(s) Oral two times a day  senna 2 Tablet(s) Oral at bedtime  sodium chloride 0.9%. 1000 milliLiter(s) IV Continuous <Continuous>    SOCIAL HISTORY: Smoked in college    FAMILY HISTORY:  Mother with recurrent UTIs, Father with heart attack reviewed with patient.    Drug Dosing Weight  Height (cm): 154.94 (02 Aug 2018 19:56)  Weight (kg): 84.8 (02 Aug 2018 19:56)  BMI (kg/m2): 35.3 (02 Aug 2018 19:56)  BSA (m2): 1.84 (02 Aug 2018 19:56)    PE:    Vital Signs Last 24 Hrs  T(C): 36.6 (03 Aug 2018 05:54), Max: 37.3 (02 Aug 2018 19:02)  T(F): 97.9 (03 Aug 2018 05:54), Max: 99.2 (02 Aug 2018 19:02)  HR: 54 (03 Aug 2018 05:54) (54 - 73)  BP: 93/60 (03 Aug 2018 05:54) (93/60 - 131/70)  BP(mean): --  RR: 18 (03 Aug 2018 05:54) (16 - 18)  SpO2: 96% (03 Aug 2018 05:54) (96% - 99%)    Gen: AOx3, NAD, non-toxic, pleasant  CV: S1+S2 normal, no murmurs  Resp: Clear bilat, no resp distress  Abd: Soft, nontender, +BS  Ext: No LE edema, no wounds  : +Begum, no CVA tenderness  IV/Skin: No thrombophlebitis  Msk: No low back pain, no arthralgias, no joint swelling  Neuro: No sensory deficits, no motor deficits    LABS:                          12.1   7.9   )-----------( 220      ( 02 Aug 2018 14:47 )             37.0       08-02    144  |  106  |  14  ----------------------------<  91  4.1   |  23  |  0.96    Ca    9.6      02 Aug 2018 14:47    TPro  7.0  /  Alb  4.5  /  TBili  0.5  /  DBili  x   /  AST  24  /  ALT  25  /  AlkPhos  61  08-02      Urinalysis Basic - ( 02 Aug 2018 15:20 )    Color: Yellow / Appearance: Clear / S.006 / pH: x  Gluc: x / Ketone: Negative  / Bili: Negative / Urobili: Negative   Blood: x / Protein: Negative / Nitrite: Negative   Leuk Esterase: Negative / RBC: 0-2 /HPF / WBC 0-2 /HPF   Sq Epi: x / Non Sq Epi: Negative /HPF / Bacteria: Negative /HPF    MICROBIOLOGY:  v  .Urine Clean Catch (Midstream)  18   No growth  --  --    RADIOLOGY:    < from: CT Abdomen and Pelvis w/ IV Cont (18 @ 16:31) >  IMPRESSION:     Rectum distended with stool and  minimal surrounding inflammatory change,   question mild stercoral colitis.    No intra-abdominal abscess. Normal appendix.    < end of copied text >

## 2018-08-03 NOTE — DISCHARGE NOTE ADULT - MEDICATION SUMMARY - MEDICATIONS TO TAKE
I will START or STAY ON the medications listed below when I get home from the hospital:    Ultra Strength Probiotic  -- 1 cap(s) by mouth once a day  -- Indication: For Supplement     Excedrin oral tablet  -- 1 tab(s) by mouth once  -- Indication: For Headache     ondansetron 4 mg oral tablet  -- 1 tab(s) by mouth every 12 hours, As Needed  -- Indication: For Nausea    Biofreeze  -- Apply on skin to affected area , As Needed  -- Indication: For Topical agent     glycerin adult rectal suppository  -- 1 suppository(ies) rectally once  -- Indication: For Laxative     Fleet Enema 7 g-19 g rectal enema  -- 133 milliliter(s) rectally , as directed by MD  -- Indication: For Laxative     docusate sodium 100 mg oral capsule  -- 1 cap(s) by mouth 2 times a day  -- Indication: For Laxative     polyethylene glycol 3350 oral powder for reconstitution  -- 17 gram(s) by mouth 2 times a day  -- Indication: For Laxative     senna oral tablet  -- 2 tab(s) by mouth once a day (at bedtime)  -- Indication: For Laxative     Visine-A 0.025%-0.3% ophthalmic solution  -- 1 drop(s) in each affected eye once a day (in the morning)  -- Indication: For Eye drop     pantoprazole 40 mg oral delayed release tablet  -- 1 tab(s) by mouth once a day (before a meal)  -- Indication: For  GERD

## 2018-08-03 NOTE — PROGRESS NOTE ADULT - ASSESSMENT
pt w/ abd pain /fecal impaction  sterococal colitis  gi eval noted  id eval for abs   bowel regimen as per gi   dvt proph  ppi   iv fluids prn  d/c if ok w/ gi/ id

## 2018-08-03 NOTE — DIETITIAN INITIAL EVALUATION ADULT. - NS FNS REASON FOR WEIGHT CHANG
pt reports wt was 170 lb ~ 6 months ago,has had intentional wt loss of 15-20 lbs. Noted dosing wt 187 lb, ? accuracy as pt doesn't appear to weigh that

## 2018-08-03 NOTE — PROVIDER CONTACT NOTE (OTHER) - ACTION/TREATMENT ORDERED:
NP made aware. tylenol ordered and to be given as ordered. safety maintained. will cont to monitor patient.

## 2018-08-03 NOTE — DISCHARGE NOTE ADULT - PLAN OF CARE
decrease pain colace, senna and miralax  Feet enema  Keep record of bowel movements Voids with ease Resolved stable Continue Protonix Continue current management colace, senna and miralax  Feet enema,   Keep record of bowel movements, Antibiotics were discontinued per ID recommendation Colace, senna and miralax  Feet enema,   Keep record of bowel movements, Antibiotics were discontinued per ID recommendation Resolved  If symptoms recur please make your PMD aware. Continue Protonix  Change the factors that you can control. Ask your caregiver for guidance concerning weight loss, quitting smoking, and alcohol consumption.  Avoid foods and drinks that make your symptoms worse foods.  Avoid lying down for the 3 hours prior to your bedtime or prior to taking a nap.  Eat small, frequent meals instead of large meals.   Wear loose-fitting clothing. Do not wear anything tight around your waist that causes pressure on your stomach. Only take over-the-counter or prescription medicines for pain, discomfort, or fever as directed by your caregiver.   Do not take aspirin, ibuprofen, or other nonsteroidal anti-inflammatory drugs (NSAIDs).  SEEK IMMEDIATE MEDICAL CARE IF:  You have pain in your arms, neck, jaw, teeth, or back.   Your pain increases or changes in intensity or duration.   You develop nausea, vomiting, or sweating (diaphoresis).  You develop shortness of breath, or you faint.  Your vomit is green, yellow, black, or looks like coffee grounds or blood.  Your stool is red, bloody, or black.  These symptoms could be signs of other problems, such as heart disease, gastric bleeding, or esophageal bleeding. Colace, senna and miralax  Fleet enema,   Keep record of bowel movements, Antibiotics were discontinued per ID recommendation

## 2018-08-03 NOTE — DISCHARGE NOTE ADULT - CARE PROVIDER_API CALL
Deshaun Rivera  Phone: [phone]  Fax: (   )    -    Tl Carlton (MD), Gastroenterology; Internal Medicine  [address] [place] [address]  Phone: [phone]  Fax: [phone] Deshaun Rivera  PCP  Phone: [phone]  Fax: (   )    -    Adrian Kaur (DO), Gastroenterology; Internal Medicine  [address] [place] [address]  Phone: [phone]  Fax: [phone]

## 2018-08-03 NOTE — DISCHARGE NOTE ADULT - PROVIDER TOKENS
FREE:[LAST:[Nicole],FIRST:[Deshaun],PHONE:[phone]],FAX:[(   )    -],ADDRESS:[[facility]]],TOKEN:'[id number]' FREE:[LAST:[Nicole],FIRST:[Deshaun],PHONE:[phone]],FAX:[(   )    -],ADDRESS:[[facility]]],TOKEN:'[id number]'

## 2018-08-03 NOTE — DISCHARGE NOTE ADULT - CARE PROVIDERS DIRECT ADDRESSES
,DirectAddress_Unknown,[email or web address] ,DirectAddress_Unknown,[email or web address]

## 2018-08-03 NOTE — DIETITIAN INITIAL EVALUATION ADULT. - ORAL INTAKE PTA
pt reports poor appetite 1 week PTA pt reports poor appetite 1 week PTA, stated she is concerned about eating 2/2 constipation

## 2018-08-03 NOTE — DISCHARGE NOTE ADULT - HOSPITAL COURSE
Pt initially went to PMD for vomiting and abdominal pain 3 wks ago and was given Levaquin PO for a  ? "kidney infection".   These sx remained the same and pt came to ER last saturday and a CT scan of abdomen showed proctitis attributed due to frequent enemas being given for ~1 month of constipation.   Pt was given IV abx and d/c w/ script for po abs   . Pt took these as prescribed. Pt had no bowel movements between last saturday when she came to the ER and yesterday when she passed one small hard ball of stool  . While straining on the toilet today, pt had onset of lower abdominal pain, causing her to call EMS and come to the emergency room.  Spoke to ID Dr Fisher and recomm to d/c home after bowel movement.  But after enema she has BRBPR. Patient is a 58yr old female who presented with a chief complaint of abdominal pain.  Patient initially went to PMD for vomiting and abdominal pain 3 wks ago and was given Levaquin PO for a "kidney infection".These sx remained the same and pt came to ER last saturday and a CT scan of abdomen showed proctitis attributed due to frequent enemas being given for ~1 month of constipation. Pt was given IV abx and d/c w/ script for po abs Pt took these as prescribed. Pt had no bowel movements between last saturday when she came to the ER and yesterday when she passed one small hard ball of stool. While straining on the toilet today, pt had onset of lower abdominal pain, causing her to call EMS and come to the emergency room. Spoke to ID Dr Fisher and recomm to d/c home after bowel movement. But after enema she has BRBPR and is sp EGD/Colonoscopy with normal findings and hemorrhoids to rectum. Patient now stable for discharge home with GI f/u as outpt.

## 2018-08-03 NOTE — CHART NOTE - NSCHARTNOTEFT_GEN_A_CORE
Patient with complain of abdominal cramps, chills and nausea while attempting to have bowel movement. Small amount of bright red  blood noted in toilet bowl, assisted back to bed safely. Cramps secondary to bowel regimen, no fever or rigors noted. Vital stable (see flow sheet). Will get stat cbc and bmp along with abdominal x-ray  for further evaluation. Tylenol given for pain. Discharge cancelled , Dr Botello made aware. Patient with complain of abdominal cramps, chills and nausea while attempting to have bowel movement. Small amount of bright red  blood noted in toilet bowl, assisted back to bed safely. Cramps secondary to bowel regimen, no fever or rigors noted. Vital stable (see flow sheet). Will get stat cbc and bmp along with abdominal x-ray  for further evaluation. Tylenol given for pain. Discharge cancelled , Dr Botello called.

## 2018-08-03 NOTE — PROGRESS NOTE ADULT - SUBJECTIVE AND OBJECTIVE BOX
BIN BISHOP:[id number],   58yFemale followed for:  Advil (Anaphylaxis)  ibuprofen (Hives)  lactose (Diarrhea)  sulfa drugs (Anaphylaxis)    PAST MEDICAL & SURGICAL HISTORY:  Acid reflux  Gastritis  Panic attack  Anxiety  No significant past surgical history    FAMILY HISTORY:    MEDICATIONS  (STANDING):  ciprofloxacin   IVPB      ciprofloxacin   IVPB 400 milliGRAM(s) IV Intermittent every 12 hours  docusate sodium 100 milliGRAM(s) Oral two times a day  heparin  Injectable 5000 Unit(s) SubCutaneous every 12 hours  metroNIDAZOLE  IVPB      metroNIDAZOLE  IVPB 500 milliGRAM(s) IV Intermittent every 8 hours  pantoprazole    Tablet 40 milliGRAM(s) Oral before breakfast  polyethylene glycol 3350 17 Gram(s) Oral two times a day  senna 2 Tablet(s) Oral at bedtime  sodium chloride 0.9%. 1000 milliLiter(s) (100 mL/Hr) IV Continuous <Continuous>    MEDICATIONS  (PRN):  lidocaine 5% Ointment 1 Application(s) Topical three times a day PRN pain      Vital Signs Last 24 Hrs  T(C): 36.6 (03 Aug 2018 05:54), Max: 37.3 (02 Aug 2018 19:02)  T(F): 97.9 (03 Aug 2018 05:54), Max: 99.2 (02 Aug 2018 19:02)  HR: 54 (03 Aug 2018 05:54) (54 - 73)  BP: 93/60 (03 Aug 2018 05:54) (93/60 - 131/70)  BP(mean): --  RR: 18 (03 Aug 2018 05:54) (16 - 18)  SpO2: 96% (03 Aug 2018 05:54) (96% - 99%)  nc/at  s1s2  cta  soft, nt, nd no guarding or rebound  no c/c/e    CBC Full  -  ( 02 Aug 2018 14:47 )  WBC Count : 7.9 K/uL  Hemoglobin : 12.1 g/dL  Hematocrit : 37.0 %  Platelet Count - Automated : 220 K/uL  Mean Cell Volume : 63.2 fl  Mean Cell Hemoglobin : 20.8 pg  Mean Cell Hemoglobin Concentration : 32.8 gm/dL  Auto Neutrophil # : 6.2 K/uL  Auto Lymphocyte # : 1.2 K/uL  Auto Monocyte # : 0.4 K/uL  Auto Eosinophil # : 0.1 K/uL  Auto Basophil # : 0.0 K/uL  Auto Neutrophil % : 78.7 %  Auto Lymphocyte % : 14.8 %  Auto Monocyte % : 5.0 %  Auto Eosinophil % : 0.9 %  Auto Basophil % : 0.6 %    08-02    144  |  106  |  14  ----------------------------<  91  4.1   |  23  |  0.96    Ca    9.6      02 Aug 2018 14:47    TPro  7.0  /  Alb  4.5  /  TBili  0.5  /  DBili  x   /  AST  24  /  ALT  25  /  AlkPhos  61  08-02

## 2018-08-03 NOTE — PROGRESS NOTE ADULT - SUBJECTIVE AND OBJECTIVE BOX
PAST MEDICAL & SURGICAL HISTORY:  Acid reflux  Gastritis  Panic attack  Anxiety  No significant past surgical history          REVIEW OF SYSTEMS:  CONSTITUTIONAL: No fever, weight loss, or fatigue  EYES: No eye pain, visual disturbances, or discharge  NECK: No pain or stiffness  RESPIRATORY: No cough, wheezing, chills or hemoptysis; No Shortness of Breath  CARDIOVASCULAR: No chest pain, palpitations, passing out, dizziness, or leg swelling  GASTROINTESTINAL: feels better had small bm  GENITOURINARY: No dysuria, frequency, hematuria, or incontinence  NEUROLOGICAL: No headaches, memory loss, loss of strength, numbness, or tremors  SKIN: No itching, burning, rashes, or lesions   LYMPH Nodes: No enlarged glands  ENDOCRINE: No heat or cold intolerance; No hair loss  MUSCULOSKELETAL: No joint pain or swelling; No muscle, back, or extremity pain    Medications:  MEDICATIONS  (STANDING):  ciprofloxacin   IVPB      ciprofloxacin   IVPB 400 milliGRAM(s) IV Intermittent every 12 hours  docusate sodium 100 milliGRAM(s) Oral two times a day  heparin  Injectable 5000 Unit(s) SubCutaneous every 12 hours  metroNIDAZOLE  IVPB      metroNIDAZOLE  IVPB 500 milliGRAM(s) IV Intermittent every 8 hours  pantoprazole    Tablet 40 milliGRAM(s) Oral before breakfast  polyethylene glycol 3350 17 Gram(s) Oral two times a day  senna 2 Tablet(s) Oral at bedtime  sodium chloride 0.9%. 1000 milliLiter(s) (100 mL/Hr) IV Continuous <Continuous>    MEDICATIONS  (PRN):  lidocaine 5% Ointment 1 Application(s) Topical three times a day PRN pain    	    PHYSICAL EXAM:  T(C): 36.4 (08-03-18 @ 10:24), Max: 37.3 (08-02-18 @ 19:02)  HR: 59 (08-03-18 @ 10:24) (54 - 73)  BP: 97/63 (08-03-18 @ 10:24) (93/60 - 131/70)  RR: 18 (08-03-18 @ 10:24) (16 - 18)  SpO2: 95% (08-03-18 @ 10:24) (95% - 99%)  Wt(kg): --  I&O's Summary    02 Aug 2018 07:01  -  03 Aug 2018 07:00  --------------------------------------------------------  IN: 1100 mL / OUT: 3325 mL / NET: -2225 mL        Appearance: Normal	  HEENT:   Normal oral mucosa, PERRL, EOMI	  Lymphatic: No lymphadenopathy  Cardiovascular: Normal S1 S2, No JVD, No murmurs, No edema  Respiratory: Lungs clear to auscultation	  Psychiatry: A & O x 3, Mood & affect appropriate  Gastrointestinal:  Soft, lild tenderness no r/g  Skin: No rashes, No ecchymoses, No cyanosis	  Neurologic: Non-focal  Extremities: Normal range of motion, No clubbing, cyanosis or edema  Vascular: Peripheral pulses palpable 2+ bilaterally    TELEMETRY: 	    ECG:  	  RADIOLOGY:  OTHER: 	  	  LABS:	 	    CARDIAC MARKERS:                                12.1   7.9   )-----------( 220      ( 02 Aug 2018 14:47 )             37.0     08-02    144  |  106  |  14  ----------------------------<  91  4.1   |  23  |  0.96    Ca    9.6      02 Aug 2018 14:47    TPro  7.0  /  Alb  4.5  /  TBili  0.5  /  DBili  x   /  AST  24  /  ALT  25  /  AlkPhos  61  08-02    proBNP:   Lipid Profile:   HgA1c:   TSH:

## 2018-08-03 NOTE — DISCHARGE NOTE ADULT - PATIENT PORTAL LINK FT
You can access the [facility] Patient Portal, offered by [facility], by registering with the following website: [email or web address]

## 2018-08-04 RX ADMIN — HEPARIN SODIUM 5000 UNIT(S): 5000 INJECTION INTRAVENOUS; SUBCUTANEOUS at 21:17

## 2018-08-04 RX ADMIN — Medication 100 MILLIGRAM(S): at 09:29

## 2018-08-04 RX ADMIN — LIDOCAINE 1 APPLICATION(S): 4 CREAM TOPICAL at 22:37

## 2018-08-04 RX ADMIN — Medication 650 MILLIGRAM(S): at 00:15

## 2018-08-04 RX ADMIN — Medication 650 MILLIGRAM(S): at 06:19

## 2018-08-04 RX ADMIN — SODIUM CHLORIDE 100 MILLILITER(S): 9 INJECTION INTRAMUSCULAR; INTRAVENOUS; SUBCUTANEOUS at 09:29

## 2018-08-04 RX ADMIN — HEPARIN SODIUM 5000 UNIT(S): 5000 INJECTION INTRAVENOUS; SUBCUTANEOUS at 09:29

## 2018-08-04 RX ADMIN — Medication 30 MILLILITER(S): at 02:40

## 2018-08-04 RX ADMIN — Medication 650 MILLIGRAM(S): at 02:40

## 2018-08-04 RX ADMIN — SODIUM CHLORIDE 100 MILLILITER(S): 9 INJECTION INTRAMUSCULAR; INTRAVENOUS; SUBCUTANEOUS at 22:37

## 2018-08-04 RX ADMIN — Medication 650 MILLIGRAM(S): at 22:34

## 2018-08-04 RX ADMIN — POLYETHYLENE GLYCOL 3350 17 GRAM(S): 17 POWDER, FOR SOLUTION ORAL at 21:17

## 2018-08-04 RX ADMIN — SENNA PLUS 2 TABLET(S): 8.6 TABLET ORAL at 21:17

## 2018-08-04 RX ADMIN — Medication 100 MILLIGRAM(S): at 21:17

## 2018-08-04 RX ADMIN — PANTOPRAZOLE SODIUM 40 MILLIGRAM(S): 20 TABLET, DELAYED RELEASE ORAL at 06:19

## 2018-08-04 RX ADMIN — SODIUM CHLORIDE 100 MILLILITER(S): 9 INJECTION INTRAMUSCULAR; INTRAVENOUS; SUBCUTANEOUS at 21:17

## 2018-08-04 RX ADMIN — Medication 650 MILLIGRAM(S): at 23:04

## 2018-08-04 NOTE — PROGRESS NOTE ADULT - ASSESSMENT
pt w/ abd pain /fecal impaction  sterococal colitis  gi eval noted  bowel regimen as per gi   dvt proph  ppi   iv fluids prn  d/c home today  pt will need outpt egd/ colon in coming weeks  discussed at length w/ pt   pt to see her gi doctor as outpt to set up procedures

## 2018-08-04 NOTE — PROGRESS NOTE ADULT - SUBJECTIVE AND OBJECTIVE BOX
BIN BISHOP:[id number],   58yFemale followed for:  Advil (Anaphylaxis)  ibuprofen (Hives)  lactose (Diarrhea)  sulfa drugs (Anaphylaxis)    PAST MEDICAL & SURGICAL HISTORY:  Acid reflux  Gastritis  Panic attack  Anxiety  No significant past surgical history    FAMILY HISTORY:    MEDICATIONS  (STANDING):  docusate sodium 100 milliGRAM(s) Oral two times a day  heparin  Injectable 5000 Unit(s) SubCutaneous every 12 hours  pantoprazole    Tablet 40 milliGRAM(s) Oral before breakfast  polyethylene glycol 3350 17 Gram(s) Oral two times a day  senna 2 Tablet(s) Oral at bedtime  sodium chloride 0.9%. 1000 milliLiter(s) (100 mL/Hr) IV Continuous <Continuous>    MEDICATIONS  (PRN):  acetaminophen   Tablet. 650 milliGRAM(s) Oral every 6 hours PRN Moderate Pain (4 - 6)  lidocaine 5% Ointment 1 Application(s) Topical three times a day PRN pain      Vital Signs Last 24 Hrs  T(C): 36.7 (04 Aug 2018 06:09), Max: 36.8 (03 Aug 2018 21:40)  T(F): 98 (04 Aug 2018 06:09), Max: 98.3 (03 Aug 2018 21:40)  HR: 60 (04 Aug 2018 06:09) (50 - 61)  BP: 114/78 (04 Aug 2018 06:09) (97/63 - 114/78)  BP(mean): --  RR: 18 (04 Aug 2018 06:09) (18 - 19)  SpO2: 96% (04 Aug 2018 06:09) (95% - 96%)  nc/at  s1s2  cta  soft, nt, nd no guarding or rebound  no c/c/e    CBC Full  -  ( 03 Aug 2018 21:26 )  WBC Count : 6.44 K/uL  Hemoglobin : 10.9 g/dL  Hematocrit : 33.6 %  Platelet Count - Automated : 160 K/uL  Mean Cell Volume : 64.2 fl  Mean Cell Hemoglobin : 20.8 pg  Mean Cell Hemoglobin Concentration : 32.4 gm/dL  Auto Neutrophil # : x  Auto Lymphocyte # : x  Auto Monocyte # : x  Auto Eosinophil # : x  Auto Basophil # : x  Auto Neutrophil % : x  Auto Lymphocyte % : x  Auto Monocyte % : x  Auto Eosinophil % : x  Auto Basophil % : x    08-03    144  |  107  |  9   ----------------------------<  137<H>  4.0   |  26  |  0.86    Ca    8.9      03 Aug 2018 19:20    TPro  7.0  /  Alb  4.5  /  TBili  0.5  /  DBili  x   /  AST  24  /  ALT  25  /  AlkPhos  61  08-02

## 2018-08-04 NOTE — PROGRESS NOTE ADULT - SUBJECTIVE AND OBJECTIVE BOX
CHIEF COMPLAINT:Patient is a 58y old  Female who presents with a chief complaint of Abdominal pain (03 Aug 2018 11:03)    	        PAST MEDICAL & SURGICAL HISTORY:  Acid reflux  Gastritis  Panic attack  Anxiety  No significant past surgical history          REVIEW OF SYSTEMS:  CONSTITUTIONAL: No fever, weight loss, or fatigue  EYES: No eye pain, visual disturbances, or discharge  NECK: No pain or stiffness  RESPIRATORY: No cough, wheezing, chills or hemoptysis; No Shortness of Breath  CARDIOVASCULAR: No chest pain, palpitations, passing out, dizziness, or leg swelling  GASTROINTESTINAL: started having bm /less abd pain  GENITOURINARY: No dysuria, frequency, hematuria, or incontinence  NEUROLOGICAL: No headaches, memory loss, loss of strength, numbness, or tremors  SKIN: No itching, burning, rashes, or lesions   LYMPH Nodes: No enlarged glands  ENDOCRINE: No heat or cold intolerance; No hair loss  MUSCULOSKELETAL: No joint pain or swelling; No muscle, back, or extremity pain    Medications:  MEDICATIONS  (STANDING):  docusate sodium 100 milliGRAM(s) Oral two times a day  heparin  Injectable 5000 Unit(s) SubCutaneous every 12 hours  pantoprazole    Tablet 40 milliGRAM(s) Oral before breakfast  polyethylene glycol 3350 17 Gram(s) Oral two times a day  senna 2 Tablet(s) Oral at bedtime  sodium chloride 0.9%. 1000 milliLiter(s) (100 mL/Hr) IV Continuous <Continuous>    MEDICATIONS  (PRN):  acetaminophen   Tablet. 650 milliGRAM(s) Oral every 6 hours PRN Moderate Pain (4 - 6)  lidocaine 5% Ointment 1 Application(s) Topical three times a day PRN pain    	    PHYSICAL EXAM:  T(C): 36.7 (08-04-18 @ 06:09), Max: 36.8 (08-03-18 @ 21:40)  HR: 60 (08-04-18 @ 06:09) (50 - 61)  BP: 114/78 (08-04-18 @ 06:09) (97/63 - 114/78)  RR: 18 (08-04-18 @ 06:09) (18 - 19)  SpO2: 96% (08-04-18 @ 06:09) (95% - 96%)  Wt(kg): --  I&O's Summary    03 Aug 2018 07:01  -  04 Aug 2018 07:00  --------------------------------------------------------  IN: 3260 mL / OUT: 3200 mL / NET: 60 mL        Appearance: Normal	  HEENT:   Normal oral mucosa, PERRL, EOMI	  Lymphatic: No lymphadenopathy  Cardiovascular: Normal S1 S2, No JVD, No murmurs, No edema  Respiratory: Lungs clear to auscultation	  Psychiatry: A & O x 3, Mood & affect appropriate  Gastrointestinal:  Soft, Non-tender, + BS	  Skin: No rashes, No ecchymoses, No cyanosis	  Neurologic: Non-focal  Extremities: Normal range of motion, No clubbing, cyanosis or edema  Vascular: Peripheral pulses palpable 2+ bilaterally    TELEMETRY: 	    ECG:  	  RADIOLOGY:  OTHER: 	  	  LABS:	 	    CARDIAC MARKERS:                                10.9   6.44  )-----------( 160      ( 03 Aug 2018 21:26 )             33.6     08-03    144  |  107  |  9   ----------------------------<  137<H>  4.0   |  26  |  0.86    Ca    8.9      03 Aug 2018 19:20    TPro  7.0  /  Alb  4.5  /  TBili  0.5  /  DBili  x   /  AST  24  /  ALT  25  /  AlkPhos  61  08-02    proBNP:   Lipid Profile:   HgA1c:   TSH:

## 2018-08-05 LAB
ALBUMIN SERPL ELPH-MCNC: 4.2 G/DL — SIGNIFICANT CHANGE UP (ref 3.3–5)
ALP SERPL-CCNC: 54 U/L — SIGNIFICANT CHANGE UP (ref 40–120)
ALT FLD-CCNC: 28 U/L — SIGNIFICANT CHANGE UP (ref 10–45)
ANION GAP SERPL CALC-SCNC: 13 MMOL/L — SIGNIFICANT CHANGE UP (ref 5–17)
AST SERPL-CCNC: 32 U/L — SIGNIFICANT CHANGE UP (ref 10–40)
BILIRUB SERPL-MCNC: 0.4 MG/DL — SIGNIFICANT CHANGE UP (ref 0.2–1.2)
BUN SERPL-MCNC: 5 MG/DL — LOW (ref 7–23)
CALCIUM SERPL-MCNC: 8.9 MG/DL — SIGNIFICANT CHANGE UP (ref 8.4–10.5)
CHLORIDE SERPL-SCNC: 105 MMOL/L — SIGNIFICANT CHANGE UP (ref 96–108)
CO2 SERPL-SCNC: 22 MMOL/L — SIGNIFICANT CHANGE UP (ref 22–31)
CREAT SERPL-MCNC: 0.78 MG/DL — SIGNIFICANT CHANGE UP (ref 0.5–1.3)
GLUCOSE SERPL-MCNC: 87 MG/DL — SIGNIFICANT CHANGE UP (ref 70–99)
POTASSIUM SERPL-MCNC: 4 MMOL/L — SIGNIFICANT CHANGE UP (ref 3.5–5.3)
POTASSIUM SERPL-SCNC: 4 MMOL/L — SIGNIFICANT CHANGE UP (ref 3.5–5.3)
PROT SERPL-MCNC: 6.4 G/DL — SIGNIFICANT CHANGE UP (ref 6–8.3)
SODIUM SERPL-SCNC: 140 MMOL/L — SIGNIFICANT CHANGE UP (ref 135–145)

## 2018-08-05 RX ADMIN — Medication 650 MILLIGRAM(S): at 14:33

## 2018-08-05 RX ADMIN — HEPARIN SODIUM 5000 UNIT(S): 5000 INJECTION INTRAVENOUS; SUBCUTANEOUS at 09:07

## 2018-08-05 RX ADMIN — Medication 650 MILLIGRAM(S): at 15:33

## 2018-08-05 RX ADMIN — HEPARIN SODIUM 5000 UNIT(S): 5000 INJECTION INTRAVENOUS; SUBCUTANEOUS at 21:20

## 2018-08-05 RX ADMIN — POLYETHYLENE GLYCOL 3350 17 GRAM(S): 17 POWDER, FOR SOLUTION ORAL at 21:20

## 2018-08-05 RX ADMIN — PANTOPRAZOLE SODIUM 40 MILLIGRAM(S): 20 TABLET, DELAYED RELEASE ORAL at 05:56

## 2018-08-05 RX ADMIN — SODIUM CHLORIDE 100 MILLILITER(S): 9 INJECTION INTRAMUSCULAR; INTRAVENOUS; SUBCUTANEOUS at 21:21

## 2018-08-05 RX ADMIN — SENNA PLUS 2 TABLET(S): 8.6 TABLET ORAL at 21:20

## 2018-08-05 RX ADMIN — Medication 100 MILLIGRAM(S): at 21:20

## 2018-08-05 RX ADMIN — SODIUM CHLORIDE 100 MILLILITER(S): 9 INJECTION INTRAMUSCULAR; INTRAVENOUS; SUBCUTANEOUS at 09:07

## 2018-08-05 NOTE — PROGRESS NOTE ADULT - SUBJECTIVE AND OBJECTIVE BOX
CHIEF COMPLAINT:Patient is a 58y old  Female who presents with a chief complaint of Abdominal pain (03 Aug 2018 11:03)    	        PAST MEDICAL & SURGICAL HISTORY:  Acid reflux  Gastritis  Panic attack  Anxiety  No significant past surgical history          REVIEW OF SYSTEMS:  CONSTITUTIONAL: No fever, weight loss, or fatigue  EYES: No eye pain, visual disturbances, or discharge  NECK: No pain or stiffness  RESPIRATORY: No cough, wheezing, chills or hemoptysis; No Shortness of Breath  CARDIOVASCULAR: No chest pain, palpitations, passing out, dizziness, or leg swelling  GASTROINTESTINAL: less abd pain   GENITOURINARY: No dysuria, frequency, hematuria, or incontinence  NEUROLOGICAL: No headaches, memory loss, loss of strength, numbness, or tremors  SKIN: No itching, burning, rashes, or lesions   LYMPH Nodes: No enlarged glands  ENDOCRINE: No heat or cold intolerance; No hair loss  MUSCULOSKELETAL: No joint pain or swelling; No muscle, back, or extremity pain    Medications:  MEDICATIONS  (STANDING):  docusate sodium 100 milliGRAM(s) Oral two times a day  heparin  Injectable 5000 Unit(s) SubCutaneous every 12 hours  pantoprazole    Tablet 40 milliGRAM(s) Oral before breakfast  polyethylene glycol 3350 17 Gram(s) Oral two times a day  senna 2 Tablet(s) Oral at bedtime  sodium chloride 0.9%. 1000 milliLiter(s) (100 mL/Hr) IV Continuous <Continuous>    MEDICATIONS  (PRN):  acetaminophen   Tablet. 650 milliGRAM(s) Oral every 6 hours PRN Moderate Pain (4 - 6)  lidocaine 5% Ointment 1 Application(s) Topical three times a day PRN pain    	    PHYSICAL EXAM:  T(C): 36.6 (08-05-18 @ 08:40), Max: 36.9 (08-04-18 @ 14:41)  HR: 63 (08-05-18 @ 08:40) (52 - 67)  BP: 117/74 (08-05-18 @ 08:40) (106/73 - 117/74)  RR: 18 (08-05-18 @ 08:40) (18 - 18)  SpO2: 94% (08-05-18 @ 08:40) (93% - 96%)  Wt(kg): --  I&O's Summary    04 Aug 2018 07:01  -  05 Aug 2018 07:00  --------------------------------------------------------  IN: 2168 mL / OUT: 4450 mL / NET: -2282 mL        Appearance: Normal	  HEENT:   Normal oral mucosa, PERRL, EOMI	  Lymphatic: No lymphadenopathy  Cardiovascular: Normal S1 S2, No JVD, No murmurs, No edema  Respiratory: Lungs clear to auscultation	  Psychiatry: A & O x 3, Mood & affect appropriate  Gastrointestinal:  Soft, Non-tender, + BS	  Skin: No rashes, No ecchymoses, No cyanosis	  Neurologic: Non-focal  Extremities: Normal range of motion, No clubbing, cyanosis or edema  Vascular: Peripheral pulses palpable 2+ bilaterally    TELEMETRY: 	    ECG:  	  RADIOLOGY:  OTHER: 	  	  LABS:	 	    CARDIAC MARKERS:                                10.9   6.44  )-----------( 160      ( 03 Aug 2018 21:26 )             33.6     08-03    144  |  107  |  9   ----------------------------<  137<H>  4.0   |  26  |  0.86    Ca    8.9      03 Aug 2018 19:20      proBNP:   Lipid Profile:   HgA1c:   TSH:

## 2018-08-05 NOTE — PROGRESS NOTE ADULT - SUBJECTIVE AND OBJECTIVE BOX
BIN BISHOP:[id number],   58yFemale followed for:  Advil (Anaphylaxis)  ibuprofen (Hives)  lactose (Diarrhea)  sulfa drugs (Anaphylaxis)    PAST MEDICAL & SURGICAL HISTORY:  Acid reflux  Gastritis  Panic attack  Anxiety  No significant past surgical history    FAMILY HISTORY:    MEDICATIONS  (STANDING):  docusate sodium 100 milliGRAM(s) Oral two times a day  heparin  Injectable 5000 Unit(s) SubCutaneous every 12 hours  pantoprazole    Tablet 40 milliGRAM(s) Oral before breakfast  polyethylene glycol 3350 17 Gram(s) Oral two times a day  senna 2 Tablet(s) Oral at bedtime  sodium chloride 0.9%. 1000 milliLiter(s) (100 mL/Hr) IV Continuous <Continuous>    MEDICATIONS  (PRN):  acetaminophen   Tablet. 650 milliGRAM(s) Oral every 6 hours PRN Moderate Pain (4 - 6)  lidocaine 5% Ointment 1 Application(s) Topical three times a day PRN pain      Vital Signs Last 24 Hrs  T(C): 36.6 (05 Aug 2018 08:40), Max: 36.9 (04 Aug 2018 14:41)  T(F): 97.8 (05 Aug 2018 08:40), Max: 98.4 (04 Aug 2018 14:41)  HR: 63 (05 Aug 2018 08:40) (52 - 67)  BP: 117/74 (05 Aug 2018 08:40) (106/73 - 117/74)  BP(mean): --  RR: 18 (05 Aug 2018 08:40) (18 - 18)  SpO2: 94% (05 Aug 2018 08:40) (93% - 96%)  nc/at  s1s2  cta  soft, nt, nd no guarding or rebound  no c/c/e    CBC Full  -  ( 03 Aug 2018 21:26 )  WBC Count : 6.44 K/uL  Hemoglobin : 10.9 g/dL  Hematocrit : 33.6 %  Platelet Count - Automated : 160 K/uL  Mean Cell Volume : 64.2 fl  Mean Cell Hemoglobin : 20.8 pg  Mean Cell Hemoglobin Concentration : 32.4 gm/dL  Auto Neutrophil # : x  Auto Lymphocyte # : x  Auto Monocyte # : x  Auto Eosinophil # : x  Auto Basophil # : x  Auto Neutrophil % : x  Auto Lymphocyte % : x  Auto Monocyte % : x  Auto Eosinophil % : x  Auto Basophil % : x    08-03    144  |  107  |  9   ----------------------------<  137<H>  4.0   |  26  |  0.86    Ca    8.9      03 Aug 2018 19:20

## 2018-08-05 NOTE — PROGRESS NOTE ADULT - ASSESSMENT
pt w/ abd pain /fecal impaction  sterococal colitis  gi eval noted  bowel regimen as per gi   dvt proph  ppi   iv fluids prn  pt will need outpt egd/ colon   planned for tomorrow given pts complaints   discussed at length w/ pt

## 2018-08-06 ENCOUNTER — RESULT REVIEW (OUTPATIENT)
Age: 58
End: 2018-08-06

## 2018-08-06 LAB
ANION GAP SERPL CALC-SCNC: 13 MMOL/L — SIGNIFICANT CHANGE UP (ref 5–17)
BUN SERPL-MCNC: 4 MG/DL — LOW (ref 7–23)
CALCIUM SERPL-MCNC: 8.9 MG/DL — SIGNIFICANT CHANGE UP (ref 8.4–10.5)
CHLORIDE SERPL-SCNC: 107 MMOL/L — SIGNIFICANT CHANGE UP (ref 96–108)
CO2 SERPL-SCNC: 25 MMOL/L — SIGNIFICANT CHANGE UP (ref 22–31)
CREAT SERPL-MCNC: 0.79 MG/DL — SIGNIFICANT CHANGE UP (ref 0.5–1.3)
GLUCOSE SERPL-MCNC: 83 MG/DL — SIGNIFICANT CHANGE UP (ref 70–99)
HCT VFR BLD CALC: 33.5 % — LOW (ref 34.5–45)
HGB BLD-MCNC: 10.9 G/DL — LOW (ref 11.5–15.5)
MCHC RBC-ENTMCNC: 20.9 PG — LOW (ref 27–34)
MCHC RBC-ENTMCNC: 32.5 GM/DL — SIGNIFICANT CHANGE UP (ref 32–36)
MCV RBC AUTO: 64.3 FL — LOW (ref 80–100)
PLATELET # BLD AUTO: 179 K/UL — SIGNIFICANT CHANGE UP (ref 150–400)
POTASSIUM SERPL-MCNC: 3.8 MMOL/L — SIGNIFICANT CHANGE UP (ref 3.5–5.3)
POTASSIUM SERPL-SCNC: 3.8 MMOL/L — SIGNIFICANT CHANGE UP (ref 3.5–5.3)
RBC # BLD: 5.21 M/UL — HIGH (ref 3.8–5.2)
RBC # FLD: 15.2 % — HIGH (ref 10.3–14.5)
SODIUM SERPL-SCNC: 145 MMOL/L — SIGNIFICANT CHANGE UP (ref 135–145)
WBC # BLD: 4.71 K/UL — SIGNIFICANT CHANGE UP (ref 3.8–10.5)
WBC # FLD AUTO: 4.71 K/UL — SIGNIFICANT CHANGE UP (ref 3.8–10.5)

## 2018-08-06 PROCEDURE — 99232 SBSQ HOSP IP/OBS MODERATE 35: CPT

## 2018-08-06 RX ADMIN — SODIUM CHLORIDE 100 MILLILITER(S): 9 INJECTION INTRAMUSCULAR; INTRAVENOUS; SUBCUTANEOUS at 09:17

## 2018-08-06 RX ADMIN — Medication 100 MILLIGRAM(S): at 21:57

## 2018-08-06 RX ADMIN — POLYETHYLENE GLYCOL 3350 17 GRAM(S): 17 POWDER, FOR SOLUTION ORAL at 21:57

## 2018-08-06 RX ADMIN — HEPARIN SODIUM 5000 UNIT(S): 5000 INJECTION INTRAVENOUS; SUBCUTANEOUS at 21:57

## 2018-08-06 RX ADMIN — PANTOPRAZOLE SODIUM 40 MILLIGRAM(S): 20 TABLET, DELAYED RELEASE ORAL at 06:22

## 2018-08-06 RX ADMIN — SENNA PLUS 2 TABLET(S): 8.6 TABLET ORAL at 21:57

## 2018-08-06 RX ADMIN — SODIUM CHLORIDE 100 MILLILITER(S): 9 INJECTION INTRAMUSCULAR; INTRAVENOUS; SUBCUTANEOUS at 20:20

## 2018-08-06 NOTE — PROGRESS NOTE ADULT - SUBJECTIVE AND OBJECTIVE BOX
Pre-Endoscopy Evaluation      Referring Physician:  Dr. Alireza Botello                                  Procedure: Egd/Colonoscopy    Indication for Procedure: Abdominal pain, Colitis    Sedation by Anesthesia [x]    PAST MEDICAL & SURGICAL HISTORY:  Acid reflux  Gastritis  Panic attack  Anxiety  No significant past surgical history      PMH of Gastroparesis [ ]  Gastric Surgery [ ]  Gastric Outlet Obstruction [ ]    Allergies    Advil (Anaphylaxis)  ibuprofen (Hives)  lactose (Diarrhea)  sulfa drugs (Anaphylaxis)    Intolerances    Latex allergy: [ ] yes [X] no    Medications:MEDICATIONS  (STANDING):  docusate sodium 100 milliGRAM(s) Oral two times a day  heparin  Injectable 5000 Unit(s) SubCutaneous every 12 hours  pantoprazole    Tablet 40 milliGRAM(s) Oral before breakfast  polyethylene glycol 3350 17 Gram(s) Oral two times a day  senna 2 Tablet(s) Oral at bedtime  sodium chloride 0.9%. 1000 milliLiter(s) (100 mL/Hr) IV Continuous <Continuous>    MEDICATIONS  (PRN):  acetaminophen   Tablet. 650 milliGRAM(s) Oral every 6 hours PRN Moderate Pain (4 - 6)  lidocaine 5% Ointment 1 Application(s) Topical three times a day PRN pain      Smoking: [ ] yes  [x] no    AICD/PPM: [ ] yes   [x] no    Pertinent lab data:                        10.9   4.71  )-----------( 179      ( 06 Aug 2018 09:24 )             33.5     08-06    145  |  107  |  4<L>  ----------------------------<  83  3.8   |  25  |  0.79    Ca    8.9      06 Aug 2018 07:35    TPro  6.4  /  Alb  4.2  /  TBili  0.4  /  DBili  x   /  AST  32  /  ALT  28  /  AlkPhos  54  08-05        Physical Examination:     Daily   Vital Signs Last 24 Hrs  T(C): 37.1 (06 Aug 2018 10:01), Max: 37.1 (06 Aug 2018 10:01)  T(F): 98.8 (06 Aug 2018 10:01), Max: 98.8 (06 Aug 2018 10:01)  HR: 56 (06 Aug 2018 10:01) (55 - 63)  BP: 125/80 (06 Aug 2018 10:01) (114/76 - 125/80)  BP(mean): --  RR: 18 (06 Aug 2018 10:01) (18 - 19)  SpO2: 97% (06 Aug 2018 10:01) (97% - 99%)    Drug Dosing Weight  Height (cm): 154.94 (02 Aug 2018 19:56)  Weight (kg): 84.8 (02 Aug 2018 19:56)  BMI (kg/m2): 35.3 (02 Aug 2018 19:56)  BSA (m2): 1.84 (02 Aug 2018 19:56)    Constitutional: NAD    Neck:  No JVD    Respiratory: CTAB/L    Cardiovascular: S1 and S2    Gastrointestinal: BS+, soft, NT/ND    Extremities: No peripheral edema    Neurological: A/O x 3    : No Olvera    Skin: No rashes    Comments:    ASA Class: I [ ]  II [ ]  III [X]  IV [ ]    The patient is a suitable candidate for the planned procedure unless box checked [ ]  No, explain:

## 2018-08-06 NOTE — PROGRESS NOTE ADULT - ASSESSMENT
56 yo female with hx of IBS, GERD, nephrolithiasis presented to the ED with abdominal pain.    CT with stercoral colitis  Afebrile  WBC WNL  UA negative  Urologist gave 7 day course of Levaquin 3 weeks ago for UTI  Seen in ED last Sat and given another course of Cipro  Olvera removed  No longer with dysuria  Urine cx neg    Impression: Given recent courses of abx and risk for C. diff with abx, and a negative UA, I have a low suspicion for UTI.   Stercoral colitis    Recommend:  -Monitor off abx.  -For pan EGD today.

## 2018-08-06 NOTE — PROGRESS NOTE ADULT - SUBJECTIVE AND OBJECTIVE BOX
BIN BISHOP:[id number],   58yFemale followed for:  Advil (Anaphylaxis)  ibuprofen (Hives)  lactose (Diarrhea)  sulfa drugs (Anaphylaxis)    PAST MEDICAL & SURGICAL HISTORY:  Acid reflux  Gastritis  Panic attack  Anxiety  No significant past surgical history    FAMILY HISTORY:    MEDICATIONS  (STANDING):  docusate sodium 100 milliGRAM(s) Oral two times a day  heparin  Injectable 5000 Unit(s) SubCutaneous every 12 hours  pantoprazole    Tablet 40 milliGRAM(s) Oral before breakfast  polyethylene glycol 3350 17 Gram(s) Oral two times a day  senna 2 Tablet(s) Oral at bedtime  sodium chloride 0.9%. 1000 milliLiter(s) (100 mL/Hr) IV Continuous <Continuous>    MEDICATIONS  (PRN):  acetaminophen   Tablet. 650 milliGRAM(s) Oral every 6 hours PRN Moderate Pain (4 - 6)  lidocaine 5% Ointment 1 Application(s) Topical three times a day PRN pain      Vital Signs Last 24 Hrs  T(C): 36.9 (06 Aug 2018 06:45), Max: 36.9 (06 Aug 2018 06:45)  T(F): 98.4 (06 Aug 2018 06:45), Max: 98.4 (06 Aug 2018 06:45)  HR: 63 (06 Aug 2018 06:45) (55 - 63)  BP: 123/74 (06 Aug 2018 06:45) (114/76 - 124/81)  BP(mean): --  RR: 19 (06 Aug 2018 06:45) (18 - 19)  SpO2: 99% (06 Aug 2018 06:45) (94% - 99%)  nc/at  s1s2  cta  soft, nt, nd no guarding or rebound  no c/c/e      08-05    140  |  105  |  5<L>  ----------------------------<  87  4.0   |  22  |  0.78    Ca    8.9      05 Aug 2018 12:22    TPro  6.4  /  Alb  4.2  /  TBili  0.4  /  DBili  x   /  AST  32  /  ALT  28  /  AlkPhos  54  08-05

## 2018-08-06 NOTE — PROGRESS NOTE ADULT - SUBJECTIVE AND OBJECTIVE BOX
CC: Patient is a 58y old  Female who presents with a chief complaint of Abdominal pain (03 Aug 2018 11:03)    Interval History/ROS: Patient no longer has begum. Having acid reflux. For pan EGD today. No longer with dysuria.     Rest of ROS negative.    Allergies  Advil (Anaphylaxis)  ibuprofen (Hives)  lactose (Diarrhea)  sulfa drugs (Anaphylaxis)    ANTIMICROBIALS:  None    PE:    Vital Signs Last 24 Hrs  T(C): 37.1 (06 Aug 2018 10:01), Max: 37.1 (06 Aug 2018 10:01)  T(F): 98.8 (06 Aug 2018 10:01), Max: 98.8 (06 Aug 2018 10:01)  HR: 56 (06 Aug 2018 10:01) (55 - 63)  BP: 125/80 (06 Aug 2018 10:01) (114/76 - 125/80)  BP(mean): --  RR: 18 (06 Aug 2018 10:01) (18 - 19)  SpO2: 97% (06 Aug 2018 10:01) (97% - 99%)    Gen: AOx3, NAD, non-toxic, pleasant  CV: S1+S2 normal, no murmurs  Resp: Clear bilat, no resp distress  Abd: Soft, nontender, +BS  Ext: No LE edema, no wounds  : No Begum  IV/Skin: No thrombophlebitis  Neuro: no focal deficits    LABS:                          10.9   4.71  )-----------( 179      ( 06 Aug 2018 09:24 )             33.5       08-06    145  |  107  |  4<L>  ----------------------------<  83  3.8   |  25  |  0.79    Ca    8.9      06 Aug 2018 07:35    TPro  6.4  /  Alb  4.2  /  TBili  0.4  /  DBili  x   /  AST  32  /  ALT  28  /  AlkPhos  54  08-05    MICROBIOLOGY:  v  .Urine Catheterized  08-02-18   No growth  --  --      .Urine Clean Catch (Midstream)  07-28-18   No growth  --  --    RADIOLOGY:    No new images.

## 2018-08-06 NOTE — PROGRESS NOTE ADULT - ASSESSMENT
pt w/ abd pain /fecal impaction  sterococal colitis  gi eval noted  bowel regimen as per gi   dvt proph  ppi   iv fluids prn  for EGD/colonoscopy today

## 2018-08-06 NOTE — PROGRESS NOTE ADULT - SUBJECTIVE AND OBJECTIVE BOX
CHIEF COMPLAINT:Patient is a 58y old  Female who presents with a chief complaint of Abdominal pain (03 Aug 2018 11:03)  for scope today    PAST MEDICAL & SURGICAL HISTORY:  Acid reflux  Gastritis  Panic attack  Anxiety  No significant past surgical history          REVIEW OF SYSTEMS:  CONSTITUTIONAL: No fever, weight loss, or fatigue  EYES: No eye pain, visual disturbances, or discharge  NECK: No pain or stiffness  RESPIRATORY: No cough, wheezing, chills or hemoptysis; No Shortness of Breath  CARDIOVASCULAR: No chest pain, palpitations, passing out, dizziness, or leg swelling  GASTROINTESTINAL: less abd pain   GENITOURINARY: No dysuria, frequency, hematuria, or incontinence  NEUROLOGICAL: No headaches, memory loss, loss of strength, numbness, or tremors  SKIN: No itching, burning, rashes, or lesions   LYMPH Nodes: No enlarged glands  ENDOCRINE: No heat or cold intolerance; No hair loss  MUSCULOSKELETAL: No joint pain or swelling; No muscle, back, or extremity pain      Medications:  MEDICATIONS  (STANDING):  docusate sodium 100 milliGRAM(s) Oral two times a day  heparin  Injectable 5000 Unit(s) SubCutaneous every 12 hours  pantoprazole    Tablet 40 milliGRAM(s) Oral before breakfast  polyethylene glycol 3350 17 Gram(s) Oral two times a day  senna 2 Tablet(s) Oral at bedtime  sodium chloride 0.9%. 1000 milliLiter(s) (100 mL/Hr) IV Continuous <Continuous>    MEDICATIONS  (PRN):  acetaminophen   Tablet. 650 milliGRAM(s) Oral every 6 hours PRN Moderate Pain (4 - 6)  lidocaine 5% Ointment 1 Application(s) Topical three times a day PRN pain    	    PHYSICAL EXAM:  T(C): 37.1 (08-06-18 @ 10:01), Max: 37.1 (08-06-18 @ 10:01)  HR: 56 (08-06-18 @ 10:01) (55 - 63)  BP: 125/80 (08-06-18 @ 10:01) (114/76 - 125/80)  RR: 18 (08-06-18 @ 10:01) (18 - 19)  SpO2: 97% (08-06-18 @ 10:01) (97% - 99%)  Wt(kg): --  I&O's Summary    05 Aug 2018 07:01  -  06 Aug 2018 07:00  --------------------------------------------------------  IN: 2108 mL / OUT: 1900 mL / NET: 208 mL      Appearance: Normal	  HEENT:   Normal oral mucosa, PERRL, EOMI	  Lymphatic: No lymphadenopathy  Cardiovascular: Normal S1 S2, No JVD, No murmurs, No edema  Respiratory: Lungs clear to auscultation	  Psychiatry: A & O x 3, Mood & affect appropriate  Gastrointestinal:  Soft, Non-tender, + BS	  Skin: No rashes, No ecchymoses, No cyanosis	  Neurologic: Non-focal  Extremities: Normal range of motion, No clubbing, cyanosis or edema  Vascular: Peripheral pulses palpable 2+ bilaterally    LABS:	 	    CARDIAC MARKERS:                                10.9   4.71  )-----------( 179      ( 06 Aug 2018 09:24 )             33.5     08-06    145  |  107  |  4<L>  ----------------------------<  83  3.8   |  25  |  0.79    Ca    8.9      06 Aug 2018 07:35    TPro  6.4  /  Alb  4.2  /  TBili  0.4  /  DBili  x   /  AST  32  /  ALT  28  /  AlkPhos  54  08-05    proBNP:   Lipid Profile:   HgA1c:   TSH:

## 2018-08-07 VITALS
OXYGEN SATURATION: 98 % | RESPIRATION RATE: 18 BRPM | DIASTOLIC BLOOD PRESSURE: 81 MMHG | TEMPERATURE: 98 F | HEART RATE: 61 BPM | SYSTOLIC BLOOD PRESSURE: 127 MMHG

## 2018-08-07 LAB
ANION GAP SERPL CALC-SCNC: 14 MMOL/L — SIGNIFICANT CHANGE UP (ref 5–17)
BUN SERPL-MCNC: 12 MG/DL — SIGNIFICANT CHANGE UP (ref 7–23)
CALCIUM SERPL-MCNC: 8.8 MG/DL — SIGNIFICANT CHANGE UP (ref 8.4–10.5)
CHLORIDE SERPL-SCNC: 105 MMOL/L — SIGNIFICANT CHANGE UP (ref 96–108)
CO2 SERPL-SCNC: 22 MMOL/L — SIGNIFICANT CHANGE UP (ref 22–31)
CREAT SERPL-MCNC: 0.84 MG/DL — SIGNIFICANT CHANGE UP (ref 0.5–1.3)
GLUCOSE SERPL-MCNC: 91 MG/DL — SIGNIFICANT CHANGE UP (ref 70–99)
HCT VFR BLD CALC: 36.3 % — SIGNIFICANT CHANGE UP (ref 34.5–45)
HGB BLD-MCNC: 11.6 G/DL — SIGNIFICANT CHANGE UP (ref 11.5–15.5)
MCHC RBC-ENTMCNC: 20.3 PG — LOW (ref 27–34)
MCHC RBC-ENTMCNC: 32 GM/DL — SIGNIFICANT CHANGE UP (ref 32–36)
MCV RBC AUTO: 63.6 FL — LOW (ref 80–100)
PLATELET # BLD AUTO: 198 K/UL — SIGNIFICANT CHANGE UP (ref 150–400)
POTASSIUM SERPL-MCNC: 3.7 MMOL/L — SIGNIFICANT CHANGE UP (ref 3.5–5.3)
POTASSIUM SERPL-SCNC: 3.7 MMOL/L — SIGNIFICANT CHANGE UP (ref 3.5–5.3)
RBC # BLD: 5.71 M/UL — HIGH (ref 3.8–5.2)
RBC # FLD: 15.4 % — HIGH (ref 10.3–14.5)
SODIUM SERPL-SCNC: 141 MMOL/L — SIGNIFICANT CHANGE UP (ref 135–145)
SURGICAL PATHOLOGY STUDY: SIGNIFICANT CHANGE UP
WBC # BLD: 5.2 K/UL — SIGNIFICANT CHANGE UP (ref 3.8–10.5)
WBC # FLD AUTO: 5.2 K/UL — SIGNIFICANT CHANGE UP (ref 3.8–10.5)

## 2018-08-07 PROCEDURE — 83690 ASSAY OF LIPASE: CPT

## 2018-08-07 PROCEDURE — 80048 BASIC METABOLIC PNL TOTAL CA: CPT

## 2018-08-07 PROCEDURE — 82330 ASSAY OF CALCIUM: CPT

## 2018-08-07 PROCEDURE — 82565 ASSAY OF CREATININE: CPT

## 2018-08-07 PROCEDURE — 74177 CT ABD & PELVIS W/CONTRAST: CPT

## 2018-08-07 PROCEDURE — 80053 COMPREHEN METABOLIC PANEL: CPT

## 2018-08-07 PROCEDURE — 85014 HEMATOCRIT: CPT

## 2018-08-07 PROCEDURE — 74018 RADEX ABDOMEN 1 VIEW: CPT

## 2018-08-07 PROCEDURE — 82803 BLOOD GASES ANY COMBINATION: CPT

## 2018-08-07 PROCEDURE — 96375 TX/PRO/DX INJ NEW DRUG ADDON: CPT | Mod: XU

## 2018-08-07 PROCEDURE — 84295 ASSAY OF SERUM SODIUM: CPT

## 2018-08-07 PROCEDURE — 82435 ASSAY OF BLOOD CHLORIDE: CPT

## 2018-08-07 PROCEDURE — 85027 COMPLETE CBC AUTOMATED: CPT

## 2018-08-07 PROCEDURE — 99285 EMERGENCY DEPT VISIT HI MDM: CPT | Mod: 25

## 2018-08-07 PROCEDURE — 87086 URINE CULTURE/COLONY COUNT: CPT

## 2018-08-07 PROCEDURE — 51702 INSERT TEMP BLADDER CATH: CPT

## 2018-08-07 PROCEDURE — 83605 ASSAY OF LACTIC ACID: CPT

## 2018-08-07 PROCEDURE — 81001 URINALYSIS AUTO W/SCOPE: CPT

## 2018-08-07 PROCEDURE — 84132 ASSAY OF SERUM POTASSIUM: CPT

## 2018-08-07 PROCEDURE — 81025 URINE PREGNANCY TEST: CPT

## 2018-08-07 PROCEDURE — 96374 THER/PROPH/DIAG INJ IV PUSH: CPT | Mod: XU

## 2018-08-07 PROCEDURE — 82947 ASSAY GLUCOSE BLOOD QUANT: CPT

## 2018-08-07 RX ADMIN — PANTOPRAZOLE SODIUM 40 MILLIGRAM(S): 20 TABLET, DELAYED RELEASE ORAL at 05:59

## 2018-08-07 NOTE — PROGRESS NOTE ADULT - SUBJECTIVE AND OBJECTIVE BOX
INTERVAL HPI/OVERNIGHT EVENTS: did well overnight, no further bleeding, neg EGD and colonoscopy results discussed    MEDICATIONS  (STANDING):  docusate sodium 100 milliGRAM(s) Oral two times a day  heparin  Injectable 5000 Unit(s) SubCutaneous every 12 hours  pantoprazole    Tablet 40 milliGRAM(s) Oral before breakfast  polyethylene glycol 3350 17 Gram(s) Oral two times a day  senna 2 Tablet(s) Oral at bedtime  sodium chloride 0.9%. 1000 milliLiter(s) (100 mL/Hr) IV Continuous <Continuous>    MEDICATIONS  (PRN):  acetaminophen   Tablet. 650 milliGRAM(s) Oral every 6 hours PRN Moderate Pain (4 - 6)  lidocaine 5% Ointment 1 Application(s) Topical three times a day PRN pain      Allergies    Advil (Anaphylaxis)  ibuprofen (Hives)  lactose (Diarrhea)  sulfa drugs (Anaphylaxis)    Intolerances            PHYSICAL EXAM:   Vital Signs:  Vital Signs Last 24 Hrs  T(C): 36.3 (07 Aug 2018 05:44), Max: 37.1 (06 Aug 2018 10:01)  T(F): 97.4 (07 Aug 2018 05:44), Max: 98.8 (06 Aug 2018 10:01)  HR: 56 (07 Aug 2018 05:44) (54 - 62)  BP: 121/79 (07 Aug 2018 05:44) (110/72 - 149/83)  BP(mean): --  RR: 18 (07 Aug 2018 05:44) (16 - 18)  SpO2: 96% (07 Aug 2018 05:44) (96% - 99%)  Daily     Daily     GENERAL:  no distress  HEENT:  NC/AT,  anicteric  CHEST:   no increased effort, breath sounds clear  HEART:  Regular rhythm  ABDOMEN:  Soft, non-tender, non-distended, normoactive bowel sounds,  no masses ,no hepato-splenomegaly, no signs of chronic liver disease  EXTEREMITIES:  no cyanosis      LABS:                        10.9   4.71  )-----------( 179      ( 06 Aug 2018 09:24 )             33.5     08-07    141  |  105  |  12  ----------------------------<  91  3.7   |  22  |  0.84    Ca    8.8      07 Aug 2018 07:06    TPro  6.4  /  Alb  4.2  /  TBili  0.4  /  DBili  x   /  AST  32  /  ALT  28  /  AlkPhos  54  08-05          RADIOLOGY & ADDITIONAL TESTS:

## 2018-08-07 NOTE — PROGRESS NOTE ADULT - ASSESSMENT
pt w/ abd pain /fecal impaction  sterococal colitis  gi eval noted  bowel regimen as per gi   dvt proph  ppi   iv fluids prn  s/p EGD/colonoscopy yesterday, no concerning findings  d/c planning

## 2018-08-07 NOTE — PROGRESS NOTE ADULT - SUBJECTIVE AND OBJECTIVE BOX
CHIEF COMPLAINT:Patient is a 58y old  Female who presents with a chief complaint of Abdominal pain (03 Aug 2018 11:03)  s/p EGD/colon yesterday    PAST MEDICAL & SURGICAL HISTORY:  Acid reflux  Gastritis  Panic attack  Anxiety  No significant past surgical history          REVIEW OF SYSTEMS:  CONSTITUTIONAL: No fever, weight loss, or fatigue  EYES: No eye pain, visual disturbances, or discharge  NECK: No pain or stiffness  RESPIRATORY: No cough, wheezing, chills or hemoptysis; No Shortness of Breath  CARDIOVASCULAR: No chest pain, palpitations, passing out, dizziness, or leg swelling  GASTROINTESTINAL: less abd pain   GENITOURINARY: No dysuria, frequency, hematuria, or incontinence  NEUROLOGICAL: No headaches, memory loss, loss of strength, numbness, or tremors  SKIN: No itching, burning, rashes, or lesions   LYMPH Nodes: No enlarged glands  ENDOCRINE: No heat or cold intolerance; No hair loss  MUSCULOSKELETAL: No joint pain or swelling; No muscle, back, or extremity pain      Medications:  MEDICATIONS  (STANDING):  docusate sodium 100 milliGRAM(s) Oral two times a day  heparin  Injectable 5000 Unit(s) SubCutaneous every 12 hours  pantoprazole    Tablet 40 milliGRAM(s) Oral before breakfast  polyethylene glycol 3350 17 Gram(s) Oral two times a day  senna 2 Tablet(s) Oral at bedtime  sodium chloride 0.9%. 1000 milliLiter(s) (100 mL/Hr) IV Continuous <Continuous>    MEDICATIONS  (PRN):  acetaminophen   Tablet. 650 milliGRAM(s) Oral every 6 hours PRN Moderate Pain (4 - 6)  lidocaine 5% Ointment 1 Application(s) Topical three times a day PRN pain    	    PHYSICAL EXAM:  T(C): 36.3 (08-07-18 @ 05:44), Max: 36.8 (08-06-18 @ 13:31)  HR: 56 (08-07-18 @ 05:44) (54 - 62)  BP: 121/79 (08-07-18 @ 05:44) (110/72 - 149/83)  RR: 18 (08-07-18 @ 05:44) (16 - 18)  SpO2: 96% (08-07-18 @ 05:44) (96% - 99%)  Wt(kg): --  I&O's Summary    06 Aug 2018 07:01  -  07 Aug 2018 07:00  --------------------------------------------------------  IN: 1700 mL / OUT: 0 mL / NET: 1700 mL      Appearance: Normal	  HEENT:   Normal oral mucosa, PERRL, EOMI	  Lymphatic: No lymphadenopathy  Cardiovascular: Normal S1 S2, No JVD, No murmurs, No edema  Respiratory: Lungs clear to auscultation	  Psychiatry: A & O x 3, Mood & affect appropriate  Gastrointestinal:  Soft, Non-tender, + BS	  Skin: No rashes, No ecchymoses, No cyanosis	  Neurologic: Non-focal  Extremities: Normal range of motion, No clubbing, cyanosis or edema  Vascular: Peripheral pulses palpable 2+ bilaterally    LABS:	 	    CARDIAC MARKERS:                                11.6   5.20  )-----------( 198      ( 07 Aug 2018 09:25 )             36.3     08-07    141  |  105  |  12  ----------------------------<  91  3.7   |  22  |  0.84    Ca    8.8      07 Aug 2018 07:06    TPro  6.4  /  Alb  4.2  /  TBili  0.4  /  DBili  x   /  AST  32  /  ALT  28  /  AlkPhos  54  08-05    proBNP:   Lipid Profile:   HgA1c:   TSH:

## 2018-09-17 ENCOUNTER — APPOINTMENT (OUTPATIENT)
Dept: GASTROENTEROLOGY | Facility: CLINIC | Age: 58
End: 2018-09-17

## 2018-11-20 ENCOUNTER — APPOINTMENT (OUTPATIENT)
Dept: ORTHOPEDIC SURGERY | Facility: CLINIC | Age: 58
End: 2018-11-20
Payer: MEDICAID

## 2018-11-20 VITALS — HEIGHT: 61 IN | BODY MASS INDEX: 28.32 KG/M2 | WEIGHT: 150 LBS

## 2018-11-20 VITALS — SYSTOLIC BLOOD PRESSURE: 122 MMHG | HEART RATE: 64 BPM | DIASTOLIC BLOOD PRESSURE: 76 MMHG

## 2018-11-20 DIAGNOSIS — M79.641 PAIN IN RIGHT HAND: ICD-10-CM

## 2018-11-20 PROCEDURE — 99204 OFFICE O/P NEW MOD 45 MIN: CPT

## 2018-11-21 ENCOUNTER — TRANSCRIPTION ENCOUNTER (OUTPATIENT)
Age: 58
End: 2018-11-21

## 2019-01-30 ENCOUNTER — APPOINTMENT (OUTPATIENT)
Dept: ORTHOPEDIC SURGERY | Facility: CLINIC | Age: 59
End: 2019-01-30
Payer: MEDICAID

## 2019-01-30 VITALS — BODY MASS INDEX: 28.32 KG/M2 | WEIGHT: 150 LBS | HEIGHT: 61 IN

## 2019-01-30 PROCEDURE — 99214 OFFICE O/P EST MOD 30 MIN: CPT

## 2019-01-31 NOTE — DISCUSSION/SUMMARY
[de-identified] : Discuss further treatment options. Her neck injection has given her good relief. I recommended evaluation for possible facet blocks for her lumbar spine. She will also try some therapy. Follow up afterwards.

## 2019-01-31 NOTE — HISTORY OF PRESENT ILLNESS
[de-identified] : Ms. BIN BISHOP  is a 58 year old female who presents with a chronic history of right lumbar radiculopathy and chronic neck pain.  She can wake up with bilateral arm numbness.  She has pain in the right lateral thigh.  Normal bowel and bladder control.   Denies any recent fevers, chills, sweats, weight loss, or infection.  She has done physical therapy for her neck and back and an injection in  her neck with temporary relief. \par

## 2019-01-31 NOTE — PHYSICAL EXAM
[Antalgic] : not antalgic [Ataxic] : not ataxic [de-identified] : Examination of the cervical spine reveals no midline or paraspinal tenderness to palpation. No cervical lymphadenopathy. Decreased range of motion with respect to flexion, extension, rotation, and lateral bending. Negative Spurlings. Negative Lhermitte's. Full range of motion bilateral shoulders without evidence of impingement. No instability of bilateral upper extremities.  Cranial nerves II through XII grossly intact. Intact sensation bilateral upper extremities. 5/5 deltoids biceps triceps wrist extensors wrist flexors finger flexors and hand intrinsics. 1+ biceps triceps and brachioradialis reflexes. Negative Alcaraz's. 2+ radial pulse. Negative Tinel's over the cubital and carpal tunnel. No skin lesions on the right and left upper extremities.\par \par Examination of the lumbar spine reveals no midline tenderness palpation, step-offs, or skin lesions. Decreased range of motion with respect to flexion, extension, lateral bending, and rotation. No tenderness to palpation of the sciatic notch. No tenderness palpation of the bilateral greater trochanters. No pain with passive internal/external rotation of the hips. No instability of bilateral lower extremities.  Negative RAMEZ. Negative straight leg raise bilaterally. No bowstring. Negative femoral stretch. 5 out of 5 iliopsoas, hip abductors, hips adductors, quadriceps, hamstrings, gastrocsoleus, tibialis anterior, extensor hallucis longus, peroneals. Grossly intact sensation to light touch bilateral lower extremities. 1+ patellar and Achilles reflexes. Downgoing Babinski. No clonus. Intact proprioception. Palpable pulses. No skin lesion and no edema on the right and left lower extremities. [de-identified] : Reviewed her previous cervical lumbar MRIs reveal degenerative changes with mild stenosis.  She does appear to have significant facet arthrosis from L4-S1

## 2019-03-28 ENCOUNTER — TRANSCRIPTION ENCOUNTER (OUTPATIENT)
Age: 59
End: 2019-03-28

## 2019-03-29 ENCOUNTER — CHART COPY (OUTPATIENT)
Age: 59
End: 2019-03-29

## 2019-04-01 ENCOUNTER — OTHER (OUTPATIENT)
Age: 59
End: 2019-04-01

## 2019-04-02 ENCOUNTER — APPOINTMENT (OUTPATIENT)
Dept: GASTROENTEROLOGY | Facility: CLINIC | Age: 59
End: 2019-04-02
Payer: MEDICAID

## 2019-04-02 VITALS
HEART RATE: 66 BPM | DIASTOLIC BLOOD PRESSURE: 77 MMHG | SYSTOLIC BLOOD PRESSURE: 108 MMHG | BODY MASS INDEX: 33.04 KG/M2 | HEIGHT: 61 IN | WEIGHT: 175 LBS

## 2019-04-02 DIAGNOSIS — L50.9 URTICARIA, UNSPECIFIED: ICD-10-CM

## 2019-04-02 PROCEDURE — 99215 OFFICE O/P EST HI 40 MIN: CPT

## 2019-04-02 RX ORDER — BUPROPION HYDROCHLORIDE 100 MG/1
100 TABLET, FILM COATED ORAL
Refills: 0 | Status: DISCONTINUED | COMMUNITY
End: 2019-04-02

## 2019-04-02 RX ORDER — POLYETHYLENE GLYCOL-3350, SODIUM CHLORIDE, POTASSIUM CHLORIDE AND SODIUM BICARBONATE 420; 11.2; 5.72; 1.48 G/438.4G; G/438.4G; G/438.4G; G/438.4G
420 POWDER, FOR SOLUTION ORAL
Qty: 1 | Refills: 0 | Status: DISCONTINUED | COMMUNITY
Start: 2018-08-02 | End: 2019-04-02

## 2019-04-02 RX ORDER — ONDANSETRON 8 MG/1
8 TABLET ORAL
Qty: 20 | Refills: 0 | Status: DISCONTINUED | COMMUNITY
Start: 2018-07-16 | End: 2019-04-02

## 2019-04-02 RX ORDER — PANTOPRAZOLE SODIUM 20 MG/1
TABLET, DELAYED RELEASE ORAL
Refills: 0 | Status: DISCONTINUED | COMMUNITY
End: 2019-04-02

## 2019-04-02 RX ORDER — ONDANSETRON 4 MG/1
4 TABLET, ORALLY DISINTEGRATING ORAL
Qty: 9 | Refills: 0 | Status: DISCONTINUED | COMMUNITY
Start: 2018-07-29 | End: 2019-04-02

## 2019-04-02 RX ORDER — BUPROPION HYDROCHLORIDE 100 MG/1
100 TABLET, FILM COATED, EXTENDED RELEASE ORAL
Qty: 60 | Refills: 0 | Status: DISCONTINUED | COMMUNITY
Start: 2017-08-03 | End: 2019-04-02

## 2019-04-02 RX ORDER — DEXTROAMPHETAMINE SACCHARATE, AMPHETAMINE ASPARTATE, DEXTROAMPHETAMINE SULFATE AND AMPHETAMINE SULFATE 2.5; 2.5; 2.5; 2.5 MG/1; MG/1; MG/1; MG/1
10 TABLET ORAL
Qty: 60 | Refills: 0 | Status: DISCONTINUED | COMMUNITY
Start: 2017-08-03 | End: 2019-04-02

## 2019-04-02 RX ORDER — FLUDROCORTISONE ACETATE 0.1 MG/1
0.1 TABLET ORAL
Qty: 12 | Refills: 0 | Status: DISCONTINUED | COMMUNITY
Start: 2018-08-13 | End: 2019-04-02

## 2019-04-02 RX ORDER — DICYCLOMINE HYDROCHLORIDE 10 MG/1
10 CAPSULE ORAL
Refills: 0 | Status: DISCONTINUED | COMMUNITY
End: 2019-04-02

## 2019-04-02 RX ORDER — LEVOFLOXACIN 500 MG/1
500 TABLET, FILM COATED ORAL
Qty: 7 | Refills: 0 | Status: DISCONTINUED | COMMUNITY
Start: 2018-07-16 | End: 2019-04-02

## 2019-04-02 RX ORDER — ESTRADIOL 0.1 MG/G
0.1 CREAM VAGINAL
Qty: 1 | Refills: 4 | Status: DISCONTINUED | COMMUNITY
Start: 2017-08-31 | End: 2019-04-02

## 2019-04-02 NOTE — REVIEW OF SYSTEMS
[Chills] : chills [Recent Weight Gain (___ Lbs)] : recent [unfilled] ~Ulb weight gain [Recent Weight Loss (___ Lbs)] : recent [unfilled] ~Ulb weight loss [Red Eyes] : red eyes [Eyesight Problems] : eyesight problems [Dry Eyes] : dryness of the eyes [Eyes Itch] : itching of the eyes [Wheezing] : wheezing [Abdominal Pain] : abdominal pain [Vomiting] : vomiting [Constipation] : constipation [Diarrhea] : diarrhea [Heartburn] : heartburn [Melena] : melgwen [Arthralgias] : arthralgias [Joint Pain] : joint pain [Joint Stiffness] : joint stiffness [Itching] : itching [Anxiety] : anxiety [Hot Flashes] : hot flashes [Muscle Weakness] : muscle weakness [Swollen Glands] : swollen glands [Swollen Glands In The Neck] : swollen glands in the neck [Negative] : Neurological [As Noted in HPI] : as noted in HPI

## 2019-04-03 NOTE — HISTORY OF PRESENT ILLNESS
[Heartburn] : stable heartburn [Nausea] : stable nausea [Vomiting] : stable vomiting [Diarrhea] : denies diarrhea [Constipation] : constipation worsened [Yellow Skin Or Eyes (Jaundice)] : denies jaundice [Abdominal Pain] : abdominal pain worsened [Abdominal Swelling] : abdominal swelling worsened [Rectal Pain] : denies rectal pain [_________] : Performed [unfilled] [de-identified] : Sanjuana presents to the office today for follow up with multiple GI complaints.  She was last seen in the office in August 2018.\par \par After her last visit on August 2, 2018, she went to the [place] [facility] for abdominal pain and rectal bleeding.  She underwent a CT scan which showed fecal impaction with stercoral colitis.  She remained in the hospital for 5 days, and underwent an EGD and colonoscopy with Dr. Adrian Kaur which was unremarkable.  On her colonoscopy only internal hemorrhoids were seen.  Her upper endoscopy was essentially normal.\par \par After her discharge, she had been taking Senokot (3 tabs daily) and reports that her constipation and abdominal symptoms were under control.  However, about one month ago she decided to stop taking Senokot and has only been using Colace.  Around this time, she also reports increased stress and anxiety due to ongoing litigation with her previous employer.  She is currently unemployed.  For the past month, she has had increased constipation, bloating, abdominal distention, and hives.  When she is severely constipated, she has also vomited.  Currently, she has a BM every 3-5 days.  She knows the stress is contributing the most, but wonders how leaky gut may be playing a role. [de-identified] : normal [de-identified] : hemorrhoids

## 2019-04-03 NOTE — ASSESSMENT
[FreeTextEntry1] : 1.  Nausea, abdominal pain, bloating, constipation in setting of recent stressors, likely functional dyspepsia, exacerbation of IBS- C.  CT A/P in August 2018 with stercoral colitis, fecal impaction.  EGD in August 2018 normal.  Colonoscopy in August 2018 with internal hemorrhoids; hyperplastic polyp, hemorrhoids at colonoscopy October 2014; strong family history of colon cancer (brother, aunt), stomach cancer (brother #2) & colon polyps (mother).\par 2.  Obesity.\par 3.  Anxiety and stress induced urticaria.\par 4.  Chronic anemia from thalassemia trait.\par 5.  History of pyelonephritis, recurrent UTIs.\par 6.  Status post breast implants, T&A, left elbow surgery.\par 7.  Former smoker.\par 8.  Allergic to sulfa, aspirin, Advil, molds.\par \par Plan:\par - Prior labs, CT scan and endoscopic reports reviewed with the patient and patient reassured.\par - Continue Claritin, [id number].\par - Small frequent meals.  Avoid eating 2-3 hours before bedtime.\par - Continue PPI in the AM.  Can consider BID dosing for the next month.\par - Ondansetron prescription refilled.\par - Patient was advised to resume Senokot along with Colace to minimize constipation.\par - Follow up in 3 months.

## 2019-04-03 NOTE — PHYSICAL EXAM
[General Appearance - Alert] : alert [General Appearance - In No Acute Distress] : in no acute distress [General Appearance - Well Nourished] : well nourished [General Appearance - Well Developed] : well developed [Sclera] : the sclera and conjunctiva were normal [Outer Ear] : the ears and nose were normal in appearance [Oropharynx] : the oropharynx was normal [Neck Appearance] : the appearance of the neck was normal [Neck Cervical Mass (___cm)] : no neck mass was observed [Jugular Venous Distention Increased] : there was no jugular-venous distention [Auscultation Breath Sounds / Voice Sounds] : lungs were clear to auscultation bilaterally [Heart Rate And Rhythm] : heart rate was normal and rhythm regular [Heart Sounds] : normal S1 and S2 [Edema] : there was no peripheral edema [Bowel Sounds] : normal bowel sounds [Abdomen Soft] : soft [Abdomen Mass (___ Cm)] : no abdominal mass palpated [Abdomen Hernia] : no hernia was discovered [Cervical Lymph Nodes Enlarged Anterior Bilaterally] : anterior cervical [Supraclavicular Lymph Nodes Enlarged Bilaterally] : supraclavicular [No CVA Tenderness] : no ~M costovertebral angle tenderness [Skin Color & Pigmentation] : normal skin color and pigmentation [] : no rash [No Focal Deficits] : no focal deficits [Oriented To Time, Place, And Person] : oriented to person, place, and time [FreeTextEntry1] : anxious

## 2019-04-04 ENCOUNTER — APPOINTMENT (OUTPATIENT)
Dept: ALLERGY | Facility: CLINIC | Age: 59
End: 2019-04-04
Payer: MEDICAID

## 2019-04-04 VITALS
RESPIRATION RATE: 14 BRPM | DIASTOLIC BLOOD PRESSURE: 80 MMHG | BODY MASS INDEX: 32.47 KG/M2 | HEART RATE: 72 BPM | SYSTOLIC BLOOD PRESSURE: 110 MMHG | WEIGHT: 172 LBS | HEIGHT: 61 IN

## 2019-04-04 DIAGNOSIS — L50.9 URTICARIA, UNSPECIFIED: ICD-10-CM

## 2019-04-04 PROCEDURE — 99204 OFFICE O/P NEW MOD 45 MIN: CPT

## 2019-04-04 RX ORDER — OMEPRAZOLE 40 MG/1
40 CAPSULE, DELAYED RELEASE ORAL
Qty: 60 | Refills: 0 | Status: DISCONTINUED | COMMUNITY
Start: 2018-07-19 | End: 2019-04-04

## 2019-04-04 RX ORDER — CEFUROXIME AXETIL 250 MG/1
250 TABLET ORAL
Refills: 0 | Status: DISCONTINUED | COMMUNITY
End: 2019-04-04

## 2019-04-04 RX ORDER — TRAMADOL HYDROCHLORIDE 50 MG/1
50 TABLET, COATED ORAL
Qty: 15 | Refills: 0 | Status: DISCONTINUED | COMMUNITY
Start: 2017-07-22 | End: 2019-04-04

## 2019-04-04 RX ORDER — ONDANSETRON 8 MG/1
8 TABLET, ORALLY DISINTEGRATING ORAL 3 TIMES DAILY
Qty: 90 | Refills: 3 | Status: DISCONTINUED | COMMUNITY
End: 2019-04-04

## 2019-04-04 NOTE — SOCIAL HISTORY
[None] : none [] :  [de-identified] : lives alone [FreeTextEntry2] : Presently unemployed  [Smokers in Household] : there are no smokers in the home

## 2019-04-04 NOTE — PHYSICAL EXAM
[Alert] : alert [Well Nourished] : well nourished [Healthy Appearance] : healthy appearance [No Acute Distress] : no acute distress [Well Developed] : well developed [Normal Pupil & Iris Size/Symmetry] : normal pupil and iris size and symmetry [No Discharge] : no discharge [No Photophobia] : no photophobia [Sclera Not Icteric] : sclera not icteric [Normal Nasal Mucosa] : the nasal mucosa was normal [Normal Lips/Tongue] : the lips and tongue were normal [Normal Tonsils] : normal tonsils [Normal Dentition] : normal dentition [No Oral Lesions or Ulcers] : no oral lesions or ulcers [No Neck Mass] : no neck mass was observed [No LAD] : no lymphadenopathy [No Thyroid Mass] : no thyroid mass [Normal Rate and Effort] : normal respiratory rhythm and effort [Normal Palpation] : palpation of the chest revealed no abnormalities [No Crackles] : no crackles [Bilateral Audible Breath Sounds] : bilateral audible breath sounds [Normal Rate] : heart rate was normal  [Normal S1, S2] : normal S1 and S2 [No murmur] : no murmur [Regular Rhythm] : with a regular rhythm [Not Tender] : non-tender [Not Distended] : not distended [No HSM] : no hepato-splenomegaly [Normal Cervical Lymph Nodes] : cervical [Normal Axillary Lumph Nodes] : axillary [No Skin Lesions] : no skin lesions [No Joint Swelling or Erythema] : no joint swelling or erythema [No clubbing] : no clubbing [No Edema] : no edema [No Cyanosis] : no cyanosis [Normal Mood] : mood was normal [Normal Affect] : affect was normal [Alert, Awake, Oriented as Age-Appropriate] : alert, awake, oriented as age appropriate [de-identified] : multiple urticarial patches

## 2019-04-04 NOTE — ASSESSMENT
[FreeTextEntry1] : Chronic idiopathic urticaria with NSAID exacerbation\par \par Claritin 20 mg QAM\par Xyzal 5 mg QHS\par Stop ranitidine\par RV 2 weeks.

## 2019-04-04 NOTE — HISTORY OF PRESENT ILLNESS
[Asthma] : asthma [Allergic Rhinitis] : allergic rhinitis [Eczematous rashes] : eczematous rashes [Venom Reactions] : venom reactions [Food Allergies] : food allergies [de-identified] : Patient was seen for chronic hives in 1992 and symptoms x 3 years.   Her symptoms worsened with NSAID medications.   Patient moved to [place] and she was under a lot of stress with 's infidelity.    She reported exacerbation of her symptoms with stress as well.  She moved back to [place] x 7 years.   Patient reports again with stress her symptoms worsened.    Patient taking Claritin 10 mg and ranitidine 150 mg BID.   She was seen in Urgent Care and treated with Medrol pack.   Psychiatrist has advised that she needs to be on preventative anxiety medications.

## 2019-04-08 ENCOUNTER — APPOINTMENT (OUTPATIENT)
Dept: ORTHOPEDIC SURGERY | Facility: CLINIC | Age: 59
End: 2019-04-08
Payer: MEDICAID

## 2019-04-08 VITALS
HEART RATE: 86 BPM | BODY MASS INDEX: 32.47 KG/M2 | SYSTOLIC BLOOD PRESSURE: 128 MMHG | WEIGHT: 172 LBS | HEIGHT: 61 IN | DIASTOLIC BLOOD PRESSURE: 86 MMHG

## 2019-04-08 PROCEDURE — 99214 OFFICE O/P EST MOD 30 MIN: CPT

## 2019-04-09 ENCOUNTER — RX RENEWAL (OUTPATIENT)
Age: 59
End: 2019-04-09

## 2019-04-10 ENCOUNTER — CHART COPY (OUTPATIENT)
Age: 59
End: 2019-04-10

## 2019-04-15 ENCOUNTER — APPOINTMENT (OUTPATIENT)
Dept: ALLERGY | Facility: CLINIC | Age: 59
End: 2019-04-15
Payer: MEDICAID

## 2019-04-15 VITALS
BODY MASS INDEX: 32.1 KG/M2 | HEIGHT: 61 IN | DIASTOLIC BLOOD PRESSURE: 80 MMHG | HEART RATE: 80 BPM | WEIGHT: 170 LBS | SYSTOLIC BLOOD PRESSURE: 120 MMHG | RESPIRATION RATE: 14 BRPM

## 2019-04-15 PROCEDURE — 99214 OFFICE O/P EST MOD 30 MIN: CPT | Mod: 25

## 2019-04-15 PROCEDURE — 94060 EVALUATION OF WHEEZING: CPT

## 2019-04-15 NOTE — HISTORY OF PRESENT ILLNESS
[Eczematous rashes] : eczematous rashes [Venom Reactions] : venom reactions [Food Allergies] : food allergies [de-identified] : Patient taking Claritin 20 mg and Xyzal QHS - she was much better for one week and then her symptoms became problematic.   She added ranitidine and improved and then worsened.   Now symptoms more problematic.   Patient also with increased coughing.

## 2019-04-15 NOTE — PHYSICAL EXAM
[Well Nourished] : well nourished [Alert] : alert [Healthy Appearance] : healthy appearance [No Acute Distress] : no acute distress [Well Developed] : well developed [Normal Pupil & Iris Size/Symmetry] : normal pupil and iris size and symmetry [No Discharge] : no discharge [No Photophobia] : no photophobia [Sclera Not Icteric] : sclera not icteric [Normal Nasal Mucosa] : the nasal mucosa was normal [Normal Lips/Tongue] : the lips and tongue were normal [Normal Tonsils] : normal tonsils [Normal Dentition] : normal dentition [No Oral Lesions or Ulcers] : no oral lesions or ulcers [No Neck Mass] : no neck mass was observed [No LAD] : no lymphadenopathy [No Thyroid Mass] : no thyroid mass [Normal Rate and Effort] : normal respiratory rhythm and effort [Normal Palpation] : palpation of the chest revealed no abnormalities [No Crackles] : no crackles [Bilateral Audible Breath Sounds] : bilateral audible breath sounds [Normal Rate] : heart rate was normal  [Normal S1, S2] : normal S1 and S2 [No murmur] : no murmur [Regular Rhythm] : with a regular rhythm [Not Tender] : non-tender [Not Distended] : not distended [No HSM] : no hepato-splenomegaly [Normal Cervical Lymph Nodes] : cervical [Normal Axillary Lumph Nodes] : axillary [No Skin Lesions] : no skin lesions [No Joint Swelling or Erythema] : no joint swelling or erythema [No clubbing] : no clubbing [No Edema] : no edema [No Cyanosis] : no cyanosis [Normal Mood] : mood was normal [Normal Affect] : affect was normal [Alert, Awake, Oriented as Age-Appropriate] : alert, awake, oriented as age appropriate [de-identified] : multiple urticarial patches

## 2019-04-15 NOTE — SOCIAL HISTORY
[None] : none [] :  [FreeTextEntry2] : Presently unemployed  [de-identified] : lives alone [Smokers in Household] : there are no smokers in the home

## 2019-04-15 NOTE — ASSESSMENT
[FreeTextEntry1] : Chronic urticaria:\par \par Prednisone 30 mg QD x 5 D\par Prednisone 20 mg QD x 3 D\par Continue Claritin 20 mg QAM \par Continue Xyzal 5 mg QHS \par Continue Ranitidine BID \par \par Mild intermittent asthma:\par \par Proair 2 puffs QID prn

## 2019-04-22 ENCOUNTER — APPOINTMENT (OUTPATIENT)
Dept: ALLERGY | Facility: CLINIC | Age: 59
End: 2019-04-22
Payer: MEDICAID

## 2019-04-22 VITALS
SYSTOLIC BLOOD PRESSURE: 132 MMHG | BODY MASS INDEX: 32.1 KG/M2 | HEIGHT: 61 IN | HEART RATE: 90 BPM | DIASTOLIC BLOOD PRESSURE: 80 MMHG | WEIGHT: 170 LBS | RESPIRATION RATE: 14 BRPM

## 2019-04-22 PROCEDURE — 99214 OFFICE O/P EST MOD 30 MIN: CPT

## 2019-04-22 NOTE — ASSESSMENT
[FreeTextEntry1] : Chronic urticaria:\par \par Prednisone 30 mg QD x 1 D\par Prednisone 20 mg QD x 3 D\par Prednisone 10 mg QD x 3 D\par \par Continue Claritin 20 mg QAM\par Continue Xyzal 5 mg QHS\par RV 2 weeks

## 2019-04-22 NOTE — HISTORY OF PRESENT ILLNESS
[de-identified] : Patient taking prednisone 30 mg QD x 2 D,  Claritin 20 mg QD and Xyzal 5 mg QD.  Symptoms are lessening.

## 2019-04-22 NOTE — PHYSICAL EXAM
[Alert] : alert [Well Nourished] : well nourished [Healthy Appearance] : healthy appearance [No Acute Distress] : no acute distress [Well Developed] : well developed [No Discharge] : no discharge [Normal Pupil & Iris Size/Symmetry] : normal pupil and iris size and symmetry [Sclera Not Icteric] : sclera not icteric [No Photophobia] : no photophobia [Normal Lips/Tongue] : the lips and tongue were normal [Normal Nasal Mucosa] : the nasal mucosa was normal [Normal Dentition] : normal dentition [Normal Tonsils] : normal tonsils [No Neck Mass] : no neck mass was observed [No Oral Lesions or Ulcers] : no oral lesions or ulcers [Normal Rate and Effort] : normal respiratory rhythm and effort [No LAD] : no lymphadenopathy [No Thyroid Mass] : no thyroid mass [Normal Palpation] : palpation of the chest revealed no abnormalities [No Crackles] : no crackles [Bilateral Audible Breath Sounds] : bilateral audible breath sounds [Normal Rate] : heart rate was normal  [No murmur] : no murmur [Normal S1, S2] : normal S1 and S2 [Regular Rhythm] : with a regular rhythm [Not Tender] : non-tender [Not Distended] : not distended [No HSM] : no hepato-splenomegaly [Normal Cervical Lymph Nodes] : cervical [No Skin Lesions] : no skin lesions [Normal Axillary Lumph Nodes] : axillary [No Joint Swelling or Erythema] : no joint swelling or erythema [No clubbing] : no clubbing [No Cyanosis] : no cyanosis [No Edema] : no edema [Normal Affect] : affect was normal [Normal Mood] : mood was normal [Alert, Awake, Oriented as Age-Appropriate] : alert, awake, oriented as age appropriate [de-identified] : scant hives on upper chest

## 2019-04-22 NOTE — SOCIAL HISTORY
[None] : none [] :  [de-identified] : lives alone [FreeTextEntry2] : Presently unemployed  [Smokers in Household] : there are no smokers in the home

## 2019-04-24 ENCOUNTER — APPOINTMENT (OUTPATIENT)
Dept: ORTHOPEDIC SURGERY | Facility: CLINIC | Age: 59
End: 2019-04-24
Payer: MEDICAID

## 2019-04-24 VITALS
BODY MASS INDEX: 32.1 KG/M2 | DIASTOLIC BLOOD PRESSURE: 80 MMHG | WEIGHT: 170 LBS | HEIGHT: 61 IN | SYSTOLIC BLOOD PRESSURE: 114 MMHG | HEART RATE: 89 BPM

## 2019-04-24 PROCEDURE — 73502 X-RAY EXAM HIP UNI 2-3 VIEWS: CPT | Mod: RT

## 2019-04-24 PROCEDURE — 99214 OFFICE O/P EST MOD 30 MIN: CPT

## 2019-04-24 NOTE — HISTORY OF PRESENT ILLNESS
[de-identified] : Patient is here for right hip pain that has been a chronic issue for many years. She has had several injuries over time but nothing that required surgery. She has been seeing PMR for her LBP and has gotten cortisone injections. She states that her hip pain radiates through her groin. She has taken Tramadol for pain with moderate relief. She is currently going to PT for her LBP and they are working on her hip as well. She states that sitting hurts more than standing and walking hurts her the most. She has been ambulating with a cane.

## 2019-04-24 NOTE — PHYSICAL EXAM
[de-identified] : Constitutional: Well-nourished, well-developed, No acute distress\par Respiratory:  Good respiratory effort, no SOB\par Lymphatic: No regional lymphadenopathy, no lymphedema\par Psychiatric: Pleasant and normal affect, alert and oriented x3\par Skin: Clean dry and intact B/L UE/LE\par Musculoskeletal: normal except where as noted in regional exam\par \par \par Left Hip:\par \par APPEARANCE: no marked deformities, no swelling or malalignment\par POSITIVE TENDERNESS: none\par NONTENDER greater trochanter, TFL, gluteal region, ischium/proximal hamstring region, hip flexor region, sartorius and pubic symphysis. \par ROM full, painless all planes. \par RESISTIVE TESTING: painless resisted ER/IR/SLR/abduction/adduction. \par SPECIAL TESTS: painless loaded flexion & scouring\par PULSES: 2+ DP/PT pulses\par Neuro:  NL sensation of thigh and lower extremity, DTRs 2+/4 patella and achilles\par \par \par B/L Knees: No asymmetry, malalignment, or swelling, Full ROM, 5/5 strength in flexion/ext, Joints stable\par B/L Ankles: No asymmetry, malalignment, or swelling, Full ROM, 5/5 strength in DF/PF/Inv/Ev, Joints stable\par BIOMECHANICAL EXAM: no marked leg length discrepancy, mild hip abductor weakness b/l, no marked foot pronation, tight hams and ITB b/l.  Normal gait and station\par \par Right Hip:\par \par APPEARANCE: no marked deformities, no swelling or malalignment\par POSITIVE TENDERNESS: Hip flexor region, sartorius, proximal rectus femoris\par NONTENDER: greater trochanter, TFL, gluteal region, ischium/proximal hamstring region, and pubic symphysis. \par ROM: Limited in all planes due to pain. \par RESISTIVE TESTING: MMT 4+/5 and mild pain with hip flexion, painless resisted ER/IR/SLR/abduction/adduction. \par SPECIAL TESTS: + RAMEZ/FADIR, mild pain with loaded flexion & scouring\par NEURO: Normal sensation of LE, DTRs 2+/4 patella and achilles\par PULSES: 2+ DP/PT pulses\par \par  [de-identified] : The following radiographs were ordered and read by me during this patient's visit. I reviewed each radiograph in detail with the patient and discussed the findings as highlighted below. \par \par 2 views of the right hip were obtained today that show degenerative changes and evidence of mild osteoarthritis in the femoroacetabular joint.  No fracture, or dislocation seen at this time. There is no malalignment. No other obvious osseous abnormality. Otherwise unremarkable.

## 2019-04-24 NOTE — DISCUSSION/SUMMARY
[de-identified] : Discussed findings of today's exam and possible causes of patient's pain.  Educated patient on their most probable diagnosis of mild right hip osteoarthritis, and likely degenerative labral tearing.  Reviewed possible courses of treatment, and we collaboratively decided best course of treatment at this time will include conservative management. Patient has long-standing history of low back pain, and right lumbar radiculopathy, difficult to ascertain what percentage of her pain is coming from that area versus her right hip arthritis. At this time recommend the addition of her right hip to her course of physical therapy. If she has persistent pain would recommend ultrasound-guided cortisone injection for diagnostic/therapeutic purposes.  Follow up as needed.  Patient appreciates and agrees with current plan.\par \par This note was generated using dragon medical dictation software.  A reasonable effort has been made for proofreading its contents, but typos may still remain.  If there are any questions or points of clarification needed please notify my office.

## 2019-05-03 ENCOUNTER — APPOINTMENT (OUTPATIENT)
Dept: OBGYN | Facility: CLINIC | Age: 59
End: 2019-05-03

## 2019-05-07 ENCOUNTER — APPOINTMENT (OUTPATIENT)
Dept: ALLERGY | Facility: CLINIC | Age: 59
End: 2019-05-07
Payer: MEDICAID

## 2019-05-07 VITALS
WEIGHT: 169 LBS | HEIGHT: 61 IN | DIASTOLIC BLOOD PRESSURE: 84 MMHG | RESPIRATION RATE: 14 BRPM | SYSTOLIC BLOOD PRESSURE: 110 MMHG | HEART RATE: 84 BPM | BODY MASS INDEX: 31.91 KG/M2

## 2019-05-07 PROCEDURE — 99214 OFFICE O/P EST MOD 30 MIN: CPT

## 2019-05-07 NOTE — PHYSICAL EXAM
[Alert] : alert [Healthy Appearance] : healthy appearance [Well Nourished] : well nourished [No Acute Distress] : no acute distress [Well Developed] : well developed [No Discharge] : no discharge [Normal Pupil & Iris Size/Symmetry] : normal pupil and iris size and symmetry [Sclera Not Icteric] : sclera not icteric [No Photophobia] : no photophobia [Normal Nasal Mucosa] : the nasal mucosa was normal [Normal Lips/Tongue] : the lips and tongue were normal [Normal Tonsils] : normal tonsils [Normal Dentition] : normal dentition [No Neck Mass] : no neck mass was observed [Boggy Nasal Turbinates] : no boggy and/or pale nasal turbinates [No Oral Lesions or Ulcers] : no oral lesions or ulcers [No LAD] : no lymphadenopathy [No Thyroid Mass] : no thyroid mass [Supple] : the neck was supple [Normal Rate and Effort] : normal respiratory rhythm and effort [Normal Palpation] : palpation of the chest revealed no abnormalities [No Retractions] : no retractions [No Crackles] : no crackles [Bilateral Audible Breath Sounds] : bilateral audible breath sounds [Normal Rate] : heart rate was normal  [Normal S1, S2] : normal S1 and S2 [No murmur] : no murmur [Regular Rhythm] : with a regular rhythm [Normal Cervical Lymph Nodes] : cervical [Normal Axillary Lumph Nodes] : axillary [No Skin Lesions] : no skin lesions [No Rash] : no rash [Skin Intact] : skin intact  [No clubbing] : no clubbing [No Edema] : no edema [No Joint Swelling or Erythema] : no joint swelling or erythema [Normal Mood] : mood was normal [No Cyanosis] : no cyanosis [Alert, Awake, Oriented as Age-Appropriate] : alert, awake, oriented as age appropriate [Normal Affect] : affect was normal

## 2019-05-07 NOTE — ASSESSMENT
[FreeTextEntry1] : Chronic idiopathic urticaria:\par \par Continue Claritin 20 mg QAM \par Continue Xyzal 5 mg QHS\par RV 2 months or prn

## 2019-05-07 NOTE — HISTORY OF PRESENT ILLNESS
[de-identified] : Patient doing much better - off prednisone - taking Claritin 20 mg QAM and Xyzal 5 mg QHS - doing extremely well now   Seeing orthopedist for hip pain.

## 2019-05-07 NOTE — SOCIAL HISTORY
[de-identified] : lives alone [FreeTextEntry2] : Presently unemployed  [] :  [None] : none [Smokers in Household] : there are no smokers in the home

## 2019-05-08 ENCOUNTER — APPOINTMENT (OUTPATIENT)
Dept: ORTHOPEDIC SURGERY | Facility: CLINIC | Age: 59
End: 2019-05-08

## 2019-05-09 ENCOUNTER — APPOINTMENT (OUTPATIENT)
Dept: GASTROENTEROLOGY | Facility: CLINIC | Age: 59
End: 2019-05-09

## 2019-05-15 ENCOUNTER — APPOINTMENT (OUTPATIENT)
Dept: ORTHOPEDIC SURGERY | Facility: CLINIC | Age: 59
End: 2019-05-15
Payer: MEDICAID

## 2019-05-30 ENCOUNTER — APPOINTMENT (OUTPATIENT)
Dept: ORTHOPEDIC SURGERY | Facility: CLINIC | Age: 59
End: 2019-05-30
Payer: MEDICAID

## 2019-05-30 VITALS — SYSTOLIC BLOOD PRESSURE: 113 MMHG | DIASTOLIC BLOOD PRESSURE: 71 MMHG | HEART RATE: 79 BPM

## 2019-05-30 DIAGNOSIS — M16.11 UNILATERAL PRIMARY OSTEOARTHRITIS, RIGHT HIP: ICD-10-CM

## 2019-05-30 PROCEDURE — 99213 OFFICE O/P EST LOW 20 MIN: CPT

## 2019-05-30 NOTE — HISTORY OF PRESENT ILLNESS
[de-identified] : Patient is here for right hip pain follow up. She states that she has had some improvement in her condition. She is not happy with the PT clinic she has been going to so she is going to switch next week. She went to [facility] to be seen for her ankle and they ended up treating her with a cortisone injection of her greater trochanter which has provided relief.

## 2019-05-30 NOTE — PHYSICAL EXAM
[de-identified] : Constitutional: Well-nourished, well-developed, No acute distress\par Respiratory:  Good respiratory effort, no SOB\par Lymphatic: No regional lymphadenopathy, no lymphedema\par Psychiatric: Pleasant and normal affect, alert and oriented x3\par Skin: Clean dry and intact B/L UE/LE\par Musculoskeletal: normal except where as noted in regional exam\par \par Right Hip:\par \par APPEARANCE: no marked deformities, no swelling or malalignment\par POSITIVE TENDERNESS: Hip flexor region, sartorius, proximal rectus femoris\par NONTENDER: greater trochanter, TFL, gluteal region, ischium/proximal hamstring region, and pubic symphysis. \par ROM: Limited in all planes due to pain. \par RESISTIVE TESTING: MMT 4+/5 and mild pain with hip flexion, painless resisted ER/IR/SLR/abduction/adduction. \par SPECIAL TESTS: + RAMEZ/FADIR, mild pain with loaded flexion & scouring\par NEURO: Normal sensation of LE, DTRs 2+/4 patella and achilles\par PULSES: 2+ DP/PT pulses\par \par

## 2019-05-30 NOTE — DISCUSSION/SUMMARY
[de-identified] : Patient was seen today for continued management of right hip pain. She was seen by an outside provider received a cortisone injection to the trochanteric bursa a few weeks ago, she feels like this gave her good temporary relief. She is to continue her course of physical therapy at this time. If she has persisting pain may consider ultrasound guided intra-articular cortisone injection to address her persistent groin pain.  Follow up as needed.  Patient appreciates and agrees with current plan.\par \par This note was generated using dragon medical dictation software.  A reasonable effort has been made for proofreading its contents, but typos may still remain.  If there are any questions or points of clarification needed please notify my office.

## 2019-06-25 ENCOUNTER — APPOINTMENT (OUTPATIENT)
Dept: GASTROENTEROLOGY | Facility: CLINIC | Age: 59
End: 2019-06-25

## 2019-06-25 ENCOUNTER — TRANSCRIPTION ENCOUNTER (OUTPATIENT)
Age: 59
End: 2019-06-25

## 2019-06-26 ENCOUNTER — APPOINTMENT (OUTPATIENT)
Dept: RHEUMATOLOGY | Facility: CLINIC | Age: 59
End: 2019-06-26

## 2019-07-08 ENCOUNTER — TRANSCRIPTION ENCOUNTER (OUTPATIENT)
Age: 59
End: 2019-07-08

## 2019-07-10 ENCOUNTER — APPOINTMENT (OUTPATIENT)
Dept: ALLERGY | Facility: CLINIC | Age: 59
End: 2019-07-10

## 2019-08-08 ENCOUNTER — APPOINTMENT (OUTPATIENT)
Dept: ORTHOPEDIC SURGERY | Facility: CLINIC | Age: 59
End: 2019-08-08
Payer: MEDICAID

## 2019-08-08 VITALS
BODY MASS INDEX: 31.91 KG/M2 | WEIGHT: 169 LBS | DIASTOLIC BLOOD PRESSURE: 83 MMHG | HEART RATE: 77 BPM | SYSTOLIC BLOOD PRESSURE: 129 MMHG | HEIGHT: 61 IN

## 2019-08-08 PROCEDURE — 99213 OFFICE O/P EST LOW 20 MIN: CPT

## 2019-08-08 NOTE — DISCUSSION/SUMMARY
[de-identified] : Patient was seen today for continued management of bilateral hip pain. She has gradually resolving right trochanteric bursitis, and possibly early development of left trochanteric bursitis. Based on today's clinical exam in our discussion do not feel that any cortisone injection is needed at this time, but may consider as he future treatment option. Patient does have some osteoarthritis of the right hip and there is occasional groin pain, but with improving right hip function and decreasing pain I believe the trochanteric bursitis is her main etiology of pain at this time and is improving and would recommend continued physical therapy. Patient requested a work note to allow her to have a seated workstation and to limit her days of the week working and hours per day working, which I agree with as this may help avoid further exacerbation of her chronic inflammatory problems.  Follow up as needed.  Patient appreciates and agrees with current plan.\par \par This note was generated using dragon medical dictation software.  A reasonable effort has been made for proofreading its contents, but typos may still remain.  If there are any questions or points of clarification needed please notify my office.

## 2019-08-08 NOTE — RETURN TO WORK/SCHOOL
[FreeTextEntry1] : Sanjuana was seen today for reevaluation of bilateral hip pain. Please allow her the following work accommodations; she may work 4 days per week, she may work 6 hours per day, and please allow her the ability to have a seated workstation during her work shifts.\par Thank you for your understanding.\par \par Sincerely,\par \par Denys Miguel DO, ATC\par Primary Care Sports Medicine\par [facility] [place]\par

## 2019-08-08 NOTE — HISTORY OF PRESENT ILLNESS
[de-identified] : Ms. Lopez is a 59 year old female who presents to the office for a follow-up visit regarding he for right hip pain.  At this point she is 75% better.  Since last visit she has been doing physical therapy and doing a HEP.  She no longer is using a cane.  She began working at [employer] in [place] and stands 10 hours/day.  At the end of the day she will get some pain.

## 2019-08-08 NOTE — PHYSICAL EXAM
[de-identified] : Constitutional: Well-nourished, well-developed, No acute distress\par Respiratory:  Good respiratory effort, no SOB\par Lymphatic: No regional lymphadenopathy, no lymphedema\par Psychiatric: Pleasant and normal affect, alert and oriented x3\par Skin: Clean dry and intact B/L UE/LE\par Musculoskeletal: normal except where as noted in regional exam\par \par Bilateral Hips:\par \par APPEARANCE: no marked deformities, no swelling or malalignment\par POSITIVE TENDERNESS: Greater trochanter, and mild distally along ITB\par NONTENDER: TFL, gluteal region, ischium/proximal hamstring region, hip flexor region, sartorius and pubic symphysis. \par ROM: full & painless. \par RESISTIVE TESTING: Mild pain in lateral hip with resisted abduction, painless resisted ER/IR/SLR/adduction. \par SPECIAL TESTS: neg RAMEZ/FADIR, painless loaded flexion & scouring. neg hop test & fulcrum test\par PULSES: 2+ DP/PT pulses\par Neuro:  NL sensation of thigh and lower extremity, DTRs 2+/4 patella and achilles\par \par

## 2019-10-10 ENCOUNTER — APPOINTMENT (OUTPATIENT)
Dept: ORTHOPEDIC SURGERY | Facility: CLINIC | Age: 59
End: 2019-10-10
Payer: MEDICAID

## 2019-10-12 DIAGNOSIS — N94.89 OTHER SPECIFIED CONDITIONS ASSOCIATED WITH FEMALE GENITAL ORGANS AND MENSTRUAL CYCLE: ICD-10-CM

## 2019-10-15 ENCOUNTER — APPOINTMENT (OUTPATIENT)
Dept: GASTROENTEROLOGY | Facility: CLINIC | Age: 59
End: 2019-10-15
Payer: MEDICAID

## 2019-10-15 VITALS
HEIGHT: 61 IN | WEIGHT: 167 LBS | HEART RATE: 77 BPM | BODY MASS INDEX: 31.53 KG/M2 | SYSTOLIC BLOOD PRESSURE: 111 MMHG | DIASTOLIC BLOOD PRESSURE: 79 MMHG

## 2019-10-15 PROCEDURE — 99215 OFFICE O/P EST HI 40 MIN: CPT
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
## 2019-10-16 NOTE — REVIEW OF SYSTEMS
[Feeling Tired] : feeling tired [Palpitations] : palpitations [Recent Weight Loss (___ Lbs)] : recent [unfilled] ~Ulb weight loss [Shortness Of Breath] : shortness of breath [Diarrhea] : diarrhea [Constipation] : constipation [Arthralgias] : arthralgias [Melena] : melgwen [Joint Swelling] : joint swelling [Joint Stiffness] : joint stiffness [Joint Pain] : joint pain [Limb Swelling] : limb swelling [Anxiety] : anxiety [Feelings Of Weakness] : feelings of weakness [Swollen Glands] : swollen glands [Muscle Weakness] : muscle weakness [Swollen Glands In The Neck] : swollen glands in the neck [Negative] : Neurological [As Noted in HPI] : as noted in HPI [FreeTextEntry5] : panic stress

## 2019-10-16 NOTE — PHYSICAL EXAM
[General Appearance - Alert] : alert [General Appearance - Well Nourished] : well nourished [General Appearance - In No Acute Distress] : in no acute distress [Sclera] : the sclera and conjunctiva were normal [General Appearance - Well Developed] : well developed [Oropharynx] : the oropharynx was normal [Outer Ear] : the ears and nose were normal in appearance [Neck Appearance] : the appearance of the neck was normal [Jugular Venous Distention Increased] : there was no jugular-venous distention [Neck Cervical Mass (___cm)] : no neck mass was observed [Auscultation Breath Sounds / Voice Sounds] : lungs were clear to auscultation bilaterally [Heart Rate And Rhythm] : heart rate was normal and rhythm regular [Heart Sounds] : normal S1 and S2 [Edema] : there was no peripheral edema [Bowel Sounds] : normal bowel sounds [] : no hepato-splenomegaly [Abdomen Mass (___ Cm)] : no abdominal mass palpated [Abdomen Soft] : soft [Abdomen Hernia] : no hernia was discovered [Supraclavicular Lymph Nodes Enlarged Bilaterally] : supraclavicular [No CVA Tenderness] : no ~M costovertebral angle tenderness [Cervical Lymph Nodes Enlarged Anterior Bilaterally] : anterior cervical [Skin Color & Pigmentation] : normal skin color and pigmentation [No Focal Deficits] : no focal deficits [Oriented To Time, Place, And Person] : oriented to person, place, and time [FreeTextEntry1] : mild distention, mild abdominal discomfort [Patient Refused] : rectal exam was refused by the patient [Abnormal Walk] : normal gait [Skin Turgor] : normal skin turgor

## 2019-10-16 NOTE — ASSESSMENT
[FreeTextEntry1] : 1.  Rectal bleeding, bloating, constipation in setting of recent stressors, likely secondary to internal hemorrhoids/stercoral colitis, functional dyspepsia, exacerbation of IBS- C.  CT A/P in October 2019 with large fecal burden, borderline splenomegaly, and congestion of pelvic vessels.  CT in August 2018 with stercoral colitis, fecal impaction.  EGD in August 2018 normal.  Colonoscopy in August 2018 with internal hemorrhoids; hyperplastic polyp, hemorrhoids at colonoscopy October 2014; strong family history of colon cancer (brother, aunt), stomach cancer (brother #2) & colon polyps (mother).\par 2.  Obesity.\par 3.  Anxiety and stress induced urticaria.\par 4.  Chronic anemia from thalassemia trait.\par 5.  History of pyelonephritis, recurrent UTIs.\par 6.  Status post breast implants, T&A, left elbow surgery.\par 7.  Former smoker.\par 8.  Allergic to sulfa, aspirin, Advil, molds.\par \par Plan:\par - Labs, CT scan and endoscopic reports reviewed with the patient and patient reassured.\par - Small frequent meals.  Avoid eating 2-3 hours before bedtime.\par - Continue PPI in the AM.\par - Patient was advised to resume Senokot along with Colace to minimize constipation.\par - Repeat colonoscopy in 2023 if stable.

## 2019-10-17 ENCOUNTER — APPOINTMENT (OUTPATIENT)
Dept: ORTHOPEDIC SURGERY | Facility: CLINIC | Age: 59
End: 2019-10-17
Payer: MEDICAID

## 2019-10-17 VITALS — WEIGHT: 167 LBS | BODY MASS INDEX: 31.53 KG/M2 | HEIGHT: 61 IN

## 2019-10-17 DIAGNOSIS — M70.61 TROCHANTERIC BURSITIS, RIGHT HIP: ICD-10-CM

## 2019-10-17 DIAGNOSIS — M70.62 TROCHANTERIC BURSITIS, RIGHT HIP: ICD-10-CM

## 2019-10-17 PROCEDURE — 99213 OFFICE O/P EST LOW 20 MIN: CPT

## 2019-10-17 NOTE — HISTORY OF PRESENT ILLNESS
[de-identified] : Patient is here for b/l hip pain follow up. She states that her pain has returned. She had an injection done at [facility] which provided some relief. She is here to discuss treatment options. She is hesitant about receiving another injection at this time as she has bloating as a side effect and has her sons wedding in a few months. She has been using CBD, swimming, exercise, and knee pillows to treat the pain.

## 2019-10-17 NOTE — DISCUSSION/SUMMARY
[de-identified] : Patient was seen today for reevaluation of bilateral hip pain, she still has clinical exam findings consistent with trochanteric bursitis. She had good relief from cortisone injections for bilateral visit and her course of physical therapy. Patient states that when she gets a cortisone injection she has tendency to retain water and had significant loading for several weeks, she has her son's wedding upcoming in 7 weeks and is concerned about the side effects I would like to defer any cortisone injections at this time.    Patient will be started on a course of physical therapy to restore normal range of motion and strength as tolerated.if patient has persisting pain she may follow up for consideration of cortisone injection after her son's wedding.   Follow up as needed.  Patient appreciates and agrees with current plan.\par \par This note was generated using dragon medical dictation software.  A reasonable effort has been made for proofreading its contents, but typos may still remain.  If there are any questions or points of clarification needed please notify my office.

## 2019-10-17 NOTE — PHYSICAL EXAM
[de-identified] : Constitutional: Well-nourished, well-developed, No acute distress\par Respiratory:  Good respiratory effort, no SOB\par Lymphatic: No regional lymphadenopathy, no lymphedema\par Psychiatric: Pleasant and normal affect, alert and oriented x3\par Skin: Clean dry and intact B/L UE/LE\par Musculoskeletal: normal except where as noted in regional exam\par \par Bilateral Hips:\par \par APPEARANCE: no marked deformities, no swelling or malalignment\par POSITIVE TENDERNESS: Greater trochanter, and mild distally along ITB\par NONTENDER: TFL, gluteal region, ischium/proximal hamstring region, hip flexor region, sartorius and pubic symphysis. \par ROM: full & painless. \par RESISTIVE TESTING: Mild pain in lateral hip with resisted abduction, painless resisted ER/IR/SLR/adduction. \par SPECIAL TESTS: neg RAMEZ/FADIR, painless loaded flexion & scouring\par PULSES: 2+ DP/PT pulses\par Neuro:  NL sensation of thigh and lower extremity, DTRs 2+/4 patella and achilles\par \par \par

## 2019-10-28 ENCOUNTER — TRANSCRIPTION ENCOUNTER (OUTPATIENT)
Age: 59
End: 2019-10-28

## 2019-10-28 PROBLEM — N94.89 PELVIC CONGESTION SYNDROME: Status: ACTIVE | Noted: 2019-10-28

## 2019-10-29 ENCOUNTER — TRANSCRIPTION ENCOUNTER (OUTPATIENT)
Age: 59
End: 2019-10-29

## 2019-10-29 ENCOUNTER — ASOB RESULT (OUTPATIENT)
Age: 59
End: 2019-10-29

## 2019-10-29 ENCOUNTER — APPOINTMENT (OUTPATIENT)
Dept: OBGYN | Facility: CLINIC | Age: 59
End: 2019-10-29
Payer: MEDICAID

## 2019-10-29 PROCEDURE — 76830 TRANSVAGINAL US NON-OB: CPT

## 2019-11-14 ENCOUNTER — APPOINTMENT (OUTPATIENT)
Dept: OBGYN | Facility: CLINIC | Age: 59
End: 2019-11-14
Payer: MEDICAID

## 2019-11-14 VITALS
HEART RATE: 73 BPM | WEIGHT: 160 LBS | HEIGHT: 61 IN | SYSTOLIC BLOOD PRESSURE: 110 MMHG | OXYGEN SATURATION: 98 % | BODY MASS INDEX: 30.21 KG/M2 | DIASTOLIC BLOOD PRESSURE: 70 MMHG

## 2019-11-14 DIAGNOSIS — R93.5 ABNORMAL FINDINGS ON DIAGNOSTIC IMAGING OF OTHER ABDOMINAL REGIONS, INCLUDING RETROPERITONEUM: ICD-10-CM

## 2019-11-14 DIAGNOSIS — Z98.82 BREAST IMPLANT STATUS: ICD-10-CM

## 2019-11-14 DIAGNOSIS — Z01.419 ENCOUNTER FOR GYNECOLOGICAL EXAMINATION (GENERAL) (ROUTINE) W/OUT ABNORMAL FINDINGS: ICD-10-CM

## 2019-11-14 DIAGNOSIS — Z87.42 PERSONAL HISTORY OF OTHER DISEASES OF THE FEMALE GENITAL TRACT: ICD-10-CM

## 2019-11-14 PROCEDURE — 99396 PREV VISIT EST AGE 40-64: CPT

## 2019-11-14 RX ORDER — LORATADINE 5 MG/5 ML
10 SOLUTION, ORAL ORAL
Qty: 30 | Refills: 11 | Status: DISCONTINUED | COMMUNITY
Start: 2019-04-02 | End: 2019-11-14

## 2019-11-14 RX ORDER — EPINEPHRINE 0.3 MG/.3ML
0.3 INJECTION INTRAMUSCULAR
Qty: 2 | Refills: 0 | Status: DISCONTINUED | COMMUNITY
Start: 2019-04-15 | End: 2019-11-14

## 2019-11-14 RX ORDER — PREDNISONE 10 MG/1
10 TABLET ORAL
Qty: 60 | Refills: 0 | Status: DISCONTINUED | COMMUNITY
Start: 2019-04-15 | End: 2019-11-14

## 2019-11-14 NOTE — CHIEF COMPLAINT
[Annual Visit] : annual visit [FreeTextEntry1] : son to be ,daughter is x 2 yrs\par mom 89 yo--[place]\par vag dryness,dysp\par rev TVS\par Rec Revaree

## 2019-11-14 NOTE — HISTORY OF PRESENT ILLNESS
[Good] : being in good health [Last Mammogram ___] : Last Mammogram was [unfilled] [Last Bone Density ___] : Last bone density studies [unfilled] [Last Colonoscopy ___] : Last colonoscopy [unfilled] [Last Pap ___] : Last cervical pap smear was [unfilled] [Postmenopausal] : is postmenopausal [Sexually Active] : is sexually active [HPV Vaccine NA Due to Age] : HPV vaccine not available to patient due to age

## 2019-11-14 NOTE — PHYSICAL EXAM
Patient Education     Fluid in the Middle Ear, No Infection (Adult)  Earaches can happen without an infection. This occurs when air and fluid build up behind the eardrum causing a feeling of fullness and discomfort and reduced hearing. This is called otitis media with effusion (OME) or serous otitis media. It means there is fluid in the middle ear. It is not the same as acute otitis media, which is typically from infection. OME can happen when you have a cold if congestion blocks the passage that drains the middle ear (eustachian tube). It may also occur with nasal allergies or after a bacterial middle ear infection. The pain/discomfort may come and go. You may hear clicking or popping sounds when you chew or swallow. You may feel that your balance is off. Or you may hear ringing in the ear. It often takes from several weeks up to 3 months for the fluid to clear on its own. Oral pain relievers and ear drops help if there is pain. Decongestants and antihistamines sometimes help. Antibiotics don't help since there is no infection. Your doctor may prescribe a nasal spray to help reduce swelling in the nose and eustachian tube. This can allow the ear to drain. If it doesn't improve after 3 months, surgery may be used to drain the fluid and insert a small tube in the eardrum to allow continued drainage. Because the middle ear fluid can become infected, it is important to watch for signs of an ear infection which may develop later. These signs include increased ear pain, fever, or drainage from the ear. Home care  The following guidelines will help you care for yourself at home:  Â· You may use acetaminophen or ibuprofen to control pain, unless another medicine was prescribed. [NOTE: If you have chronic liver or kidney disease or ever had a stomach ulcer or GI bleeding, talk with your doctor before using these medicines.] (Aspirin should never be used in anyone under 25years of age who is ill with a fever.  It may cause severe liver damage. )  Â· While not always helpful, you may use over-the-counter decongestants such as phenylephrine or pseudoephedrine. Don't use nasal spray decongestants more than 3 days. Longer use can make congestion worse. (Prescription nasal sprays from your doctor don't typically have those restrictions.)  Â· Antihistamines may help if you are also having allergy symptoms. Â· You may use medicines such as guaifenesin to thin mucus and promote drainage. Follow-up care  Follow up with your doctor or as advised if you are not feeling better after 3 days. When to seek medical care  Get prompt medical attention if any of the following occur:  Â· Ear pain gets worse or does not start to improveÂ   Â· Fever of 100.4Â°F (38Â°C) or higher, or as directed by your health care provider  Â· Fluid or blood draining from the ear  Â· Headache or sinus pain  Â· Stiff neck  Â· Unusual drowsiness or confusion  Â© 5592-8332 [address]. All rights reserved. This information is not intended as a substitute for professional medical care. Always follow your healthcare professional's instructions. [Awake] : awake [Alert] : alert [Acute Distress] : no acute distress [Mass] : no breast mass [Nipple Discharge] : no nipple discharge [Axillary LAD] : no axillary lymphadenopathy [Soft] : soft [Breast Implant Bilateral] : implants [Oriented x3] : oriented to person, place, and time [Tender] : non tender [Normal] : uterus [Atrophy] : atrophy [No Bleeding] : there was no active vaginal bleeding [Uterine Adnexae] : were not tender and not enlarged

## 2019-11-23 LAB
CYTOLOGY CVX/VAG DOC THIN PREP: ABNORMAL
HPV HIGH+LOW RISK DNA PNL CVX: DETECTED

## 2019-12-20 ENCOUNTER — OTHER (OUTPATIENT)
Age: 59
End: 2019-12-20

## 2020-01-16 ENCOUNTER — EMERGENCY (EMERGENCY)
Facility: HOSPITAL | Age: 60
LOS: 1 days | End: 2020-01-16
Attending: EMERGENCY MEDICINE
Payer: COMMERCIAL

## 2020-01-16 VITALS
SYSTOLIC BLOOD PRESSURE: 138 MMHG | DIASTOLIC BLOOD PRESSURE: 86 MMHG | RESPIRATION RATE: 18 BRPM | HEART RATE: 71 BPM | OXYGEN SATURATION: 98 %

## 2020-01-16 VITALS
RESPIRATION RATE: 17 BRPM | DIASTOLIC BLOOD PRESSURE: 82 MMHG | HEART RATE: 81 BPM | HEIGHT: 61 IN | TEMPERATURE: 98 F | WEIGHT: 160.06 LBS | SYSTOLIC BLOOD PRESSURE: 142 MMHG | OXYGEN SATURATION: 99 %

## 2020-01-16 PROCEDURE — 73630 X-RAY EXAM OF FOOT: CPT | Mod: 26,RT

## 2020-01-16 PROCEDURE — 72170 X-RAY EXAM OF PELVIS: CPT

## 2020-01-16 PROCEDURE — 29515 APPLICATION SHORT LEG SPLINT: CPT | Mod: RT

## 2020-01-16 PROCEDURE — 99284 EMERGENCY DEPT VISIT MOD MDM: CPT | Mod: 25

## 2020-01-16 PROCEDURE — 73610 X-RAY EXAM OF ANKLE: CPT

## 2020-01-16 PROCEDURE — 29515 APPLICATION SHORT LEG SPLINT: CPT

## 2020-01-16 PROCEDURE — 73630 X-RAY EXAM OF FOOT: CPT

## 2020-01-16 PROCEDURE — 73610 X-RAY EXAM OF ANKLE: CPT | Mod: 26,RT

## 2020-01-16 PROCEDURE — 72170 X-RAY EXAM OF PELVIS: CPT | Mod: 26

## 2020-01-16 RX ORDER — OXYCODONE HYDROCHLORIDE 5 MG/1
5 TABLET ORAL ONCE
Refills: 0 | Status: DISCONTINUED | OUTPATIENT
Start: 2020-01-16 | End: 2020-01-16

## 2020-01-16 RX ADMIN — OXYCODONE HYDROCHLORIDE 5 MILLIGRAM(S): 5 TABLET ORAL at 19:59

## 2020-01-16 NOTE — ED PROVIDER NOTE - PHYSICAL EXAMINATION
General: Patient awake alert NAD.   HEENT: normocephalic, atraumatic, EOMI, no scleral icterus.   Cardiac: RRR, S1, S2, no murmur, rubs, gallop,   LUNGS: CTA B/L no wheeze, rhonchi, rales.   Abdomen: soft NT, ND, no rebound no guarding  EXT: + right lateral foot swelling and tenderness to palpation at base on 5th metatarsal. No tenderness in medial or lateral malleolus of right ankle. motor and sensory intact in right foot.   Neuro: A&Ox3, no focal neurological deficits, CN 2-12 grossly intact  Skin: warm, dry, no rash, no lesions

## 2020-01-16 NOTE — ED PROVIDER NOTE - ATTENDING CONTRIBUTION TO CARE
Attending MD Cuadra:  I personally have seen and examined this patient.  Resident note reviewed and agree on plan of care and except where noted.

## 2020-01-16 NOTE — ED PROVIDER NOTE - NS_ ATTENDINGSCRIBEDETAILS _ED_A_ED_FT
Attending MD Cuadra: The documentation recorded by the scribe accurately reflects  the service I personally performed and the decisions made by me.

## 2020-01-16 NOTE — ED ADULT NURSE NOTE - OBJECTIVE STATEMENT
58 y/o F presents to ED s/p inversion injury of R ankle s/p stepping off uneven curb, fell over, did not hit head, no LOC, hit R hip, c/o R hip pain and R side of foot pain, pos bruise/deformity to proximal 5th metatarsal noted. Pt has pos and equal sensation to extremities bilat, pos and equal strength to extremities x 4, strong peripheral pulses x 4, no numbness/tingling. Son at bedside. Of note, pt is using tramadol for dental procedure, last took this am. Pt currently icing R foot.

## 2020-01-16 NOTE — ED PROVIDER NOTE - OBJECTIVE STATEMENT
59y F with PMHx of anxiety, panic attacks and past surgery on R ankle presents to ED s/p mechanical trip and fall 1 hour prior to arrival with eversion of R foot. Pt does not remember how she fell and is currently unable to stand. Endorses R foot 5th metatarsal deformity with R anterior hip pain. Denies tenderness at the R ankle.

## 2020-01-16 NOTE — ED PROVIDER NOTE - NSFOLLOWUPINSTRUCTIONS_ED_ALL_ED_FT
You were seen in the Emergency Department (ED) for . You were found to have _.     You are prescribed _. Please take as directed by your pharmacists.   Please take over the counter Tylenol 650mg every 4 hours or Ibuprofen 600mg every 6hour for your pain.   Please follow up with your primary care doctor in the next 72 hours.    Please return to the ED if you experience any new or concerning symptoms, such as: worsening pain, chest pain, difficulty breathing, passing out, unable to eat or drink, unable to move or feel part of your body, fever, chills.     Thank you for choosing a [facility] ED. You were seen in the Emergency Department (ED) for foot pain. You were found to have pseudo-zuñiga fracture. Your fracture was splinted in the ED. .     Please take over the counter Tylenol or Ibuprofen as directed for your pain.   Please follow up with your primary care doctor in the next 72 hours. Please follow up with your orthopedist next week.     Please return to the ED if you experience any new or concerning symptoms, such as: worsening foot pain, persistent numbness and tingling in your toes, cold foot, fever, chills.     Thank you for choosing a [facility] ED.

## 2020-01-16 NOTE — ED PROVIDER NOTE - PATIENT PORTAL LINK FT
You can access the FollowMyHealth Patient Portal offered by [facility] by registering at the following website: [email or web address]. By joining [facility]’s FollowMyHealth portal, you will also be able to view your health information using other applications (apps) compatible with our system.

## 2020-01-16 NOTE — ED PROVIDER NOTE - NS_EDPROVIDERDISPOUSERTYPE_ED_A_ED
Awake/Alert/Cooperative Scribe Attestation (For Scribes USE Only)... Attending Attestation (For Attendings USE Only).../Scribe Attestation (For Scribes USE Only)...

## 2020-01-16 NOTE — ED PROCEDURE NOTE - ATTENDING CONTRIBUTION TO CARE
Attending MD Cuadra: Risks, benefit and alternatives of procedure explained to patient and patient demonstrated verbal understanding and consent.  I was present during the key portions of the procedure.

## 2020-01-16 NOTE — ED PROVIDER NOTE - PROGRESS NOTE DETAILS
Lucy Buck M.D. Resident  posterior splint placed. Pt agreeable to follow up with her orthopedist next week.   Pt education on proper use of crutches.

## 2020-01-16 NOTE — ED ADULT NURSE NOTE - NSIMPLEMENTINTERV_GEN_ALL_ED
Implemented All Fall Risk Interventions:  [place] to call system. Call bell, personal items and telephone within reach. Instruct patient to call for assistance. Room bathroom lighting operational. Non-slip footwear when patient is off stretcher. Physically safe environment: no spills, clutter or unnecessary equipment. Stretcher in lowest position, wheels locked, appropriate side rails in place. Provide visual cue, wrist band, yellow gown, etc. Monitor gait and stability. Monitor for mental status changes and reorient to person, place, and time. Review medications for side effects contributing to fall risk. Reinforce activity limits and safety measures with patient and family.

## 2020-01-17 ENCOUNTER — APPOINTMENT (OUTPATIENT)
Dept: ORTHOPEDIC SURGERY | Facility: CLINIC | Age: 60
End: 2020-01-17
Payer: OTHER MISCELLANEOUS

## 2020-01-17 VITALS — HEIGHT: 61 IN | WEIGHT: 160 LBS | BODY MASS INDEX: 30.21 KG/M2

## 2020-01-17 PROCEDURE — 99214 OFFICE O/P EST MOD 30 MIN: CPT

## 2020-01-20 NOTE — PHYSICAL EXAM
[de-identified] : Constitutional: Well-nourished, well-developed, No acute distress\par Respiratory:  Good respiratory effort, no SOB\par Lymphatic: No regional lymphadenopathy, no lymphedema\par Psychiatric: Pleasant and normal affect, alert and oriented x3\par Skin: Clean dry and intact B/L UE/LE\par Musculoskeletal: normal except where as noted in regional exam\par \par \par Left Foot:\par APPEARANCE: no marked deformities, no swelling or malalignment\par POSITIVE TENDERNESS: none\par NONTENDER: 5th metatarsal base, cuboid, 1st MTP, dorsum & plantar surfaces, medial heel, mid heel. \par ROM: normal throughout foot, ankle, and digits. \par RESISTIVE TESTING: painless flex/ext, abd/add of all digits. \par SPECIAL TESTS:  neg Tinel's at tarsal tunnel. \par NEURO: Normal sensation of LE, DTRs 2+/4 patella and achilles\par PULSES: 2+ DP/PT pulses\par \par B/L Knees: No asymmetry, malalignment, or swelling, Full ROM, 5/5 strength in Flex/Ext, Joints stable\par B/L Ankles: No asymmetry, malalignment, or swelling, Full ROM, 5/5 strength in DF/PF/Inv/Ev, Joints stable\par \par Right Foot:\par APPEARANCE: + swelling/ecchymosis of the mid foot and lateral foot, no marked deformities or malalignment\par POSITIVE TENDERNESS: + Significant TTP of base of fifth metatarsal, moderate tenderness with palpation of the dorsum of the midfoot\par NONTENDER: cuboid, 1st MTP, dorsum & plantar surfaces, medial heel, mid heel. \par ROM: normal throughout foot, ankle, and digits. \par RESISTIVE TESTING: + pain with resisted eversion and dorsiflexion at the base of the 5th metatarsal, painless flex/ext, abd/add of all digits. \par SPECIAL TESTS:  neg Tinel's at tarsal tunnel. \par NEURO: Normal sensation of LE, DTRs 2+/4 patella and achilles\par PULSES: 2+ DP/PT pulses\par \par \par  [de-identified] : I reviewed and clinically correlated the following outside imaging studies,\par \par EXAM: ANKLE RIGHT (MINIMUM 3 VIEWS) \par \par EXAM: FOOT COMPLETE RIGHT (MIN 3 VIEW) \par \par \par PROCEDURE DATE: 01/16/2020 \par \par \par \par \par INTERPRETATION: CLINICAL INFORMATION: Status post fall with ankle reversion. \par \par TECHNIQUE: 3 views of the right foot, 3 views of the right ankle 1/16/2020. \par \par COMPARISON: None. \par \par IMPRESSION: \par \par Comminuted minimally displaced fracture at the base of the fifth metatarsal. \par No dislocation. \par No additional fractures appreciated. \par Ankle mortise is intact.

## 2020-01-20 NOTE — HISTORY OF PRESENT ILLNESS
[de-identified] : Patient is here for right foot pain x1 day. She was at work when she rolled her right ankle and ended up falling onto her left side. She went to the [facility] ED where she had x-rays taken and was told that she had a fracture at the base of her 5th metatarsal. She was placed in a splint and sent home with oxycodone. She has also been taking Tramadol for a planned root canal. She denies any numbness or tingling. Her pain and swelling are concentrated in the lateral side of her right foot.

## 2020-01-20 NOTE — DISCUSSION/SUMMARY
[de-identified] : Discussed findings of today's exam and possible causes of patient's pain.  Educated patient on their diagnosis of right foot nondisplaced transverse base of fifth metatarsal fracture.  Reviewed possible courses of treatment, and we collaboratively decided best course of treatment at this time will include conservative management. Patient will be immobilized in a Cam Walker boot for a total of 4 weeks. With involvement of her right foot she can not be driving. Patient may transition off of crutches over the next 1-2 weeks and may be weightbearing as tolerated in her cam boot. Patient states she is okay taking over-the-counter medications as needed for pain, does not require any narcotic pain medication at this time. Patient will followup in one week for repeat x-ray to assure there is no displacement of the fracture site.  Patient appreciates and agrees with current plan.\par \par This note was generated using dragon medical dictation software.  A reasonable effort has been made for proofreading its contents, but typos may still remain.  If there are any questions or points of clarification needed please notify my office.

## 2020-01-22 DIAGNOSIS — Z98.890 OTHER SPECIFIED POSTPROCEDURAL STATES: Chronic | ICD-10-CM

## 2020-01-23 ENCOUNTER — APPOINTMENT (OUTPATIENT)
Dept: ORTHOPEDIC SURGERY | Facility: CLINIC | Age: 60
End: 2020-01-23
Payer: OTHER MISCELLANEOUS

## 2020-01-23 DIAGNOSIS — S92.351A DISPLACED FRACTURE OF FIFTH METATARSAL BONE, RIGHT FOOT, INITIAL ENCOUNTER FOR CLOSED FRACTURE: ICD-10-CM

## 2020-01-23 DIAGNOSIS — S92.351D DISPLACED FRACTURE OF FIFTH METATARSAL BONE, RIGHT FOOT, SUBSEQUENT ENCOUNTER FOR FRACTURE WITH ROUTINE HEALING: ICD-10-CM

## 2020-01-23 PROCEDURE — 99213 OFFICE O/P EST LOW 20 MIN: CPT

## 2020-01-23 PROCEDURE — 73630 X-RAY EXAM OF FOOT: CPT | Mod: RT

## 2020-01-23 NOTE — HISTORY OF PRESENT ILLNESS
[de-identified] : Patient is here today following up after a Closed fracture of base of fifth metatarsal bone of right foot. Patient has continued using cam walker and crutches. She has not put any weight on her foot at this time but states that when she lets it rest in a dependent position she feels a throbbing sensation. She states that she is having pain on her contralateral hip and knee due to putting extra weight on it from being non weight bearing on her right leg. There has been no recent injury.

## 2020-01-23 NOTE — DISCUSSION/SUMMARY
[de-identified] : Patient was seen today for followup of right foot base of fifth metatarsal fracture. She has no increased displacement of the fracture fragments since last evaluation one week ago. At this time recommend continued immobilization in her Cam Walker boot for another 3 weeks. Patient has been completely nonweightbearing on the right lower extremity, she is advised that she may be full weightbearing as tolerated. We thoroughly reviewed how she can be ambulating with 2 crutches, transition to a single crutch, and transition off of crutches as soon as tolerated over the next 1-2 weeks. Patient will followup in 3 weeks for repeat x-ray and reassessment. She'll be out of work until reassessed.  Patient appreciates and agrees with current plan.\par \par This note was generated using dragon medical dictation software.  A reasonable effort has been made for proofreading its contents, but typos may still remain.  If there are any questions or points of clarification needed please notify my office.

## 2020-01-23 NOTE — PHYSICAL EXAM
[de-identified] : Constitutional: Well-nourished, well-developed, No acute distress\par Respiratory:  Good respiratory effort, no SOB\par Lymphatic: No regional lymphadenopathy, no lymphedema\par Psychiatric: Pleasant and normal affect, alert and oriented x3\par Skin: Clean dry and intact B/L UE/LE\par Musculoskeletal: normal except where as noted in regional exam\par \par Right Foot:\par APPEARANCE: + swelling/ecchymosis of the mid foot and lateral foot, no marked deformities or malalignment\par POSITIVE TENDERNESS: + Significant TTP of base of fifth metatarsal, moderate tenderness with palpation of the dorsum of the midfoot\par NONTENDER: cuboid, 1st MTP, dorsum & plantar surfaces, medial heel, mid heel. \par ROM: normal throughout foot, ankle, and digits. \par RESISTIVE TESTING: + pain with resisted eversion and dorsiflexion at the base of the 5th metatarsal, painless flex/ext, abd/add of all digits. \par SPECIAL TESTS:  neg Tinel's at tarsal tunnel. \par NEURO: Normal sensation of LE, DTRs 2+/4 patella and achilles\par PULSES: 2+ DP/PT pulses\par \par \par  [de-identified] : The following radiographs were ordered and read by me during this patient's visit. I reviewed each radiograph in detail with the patient and discussed the findings as highlighted below. \par \par 3 views of the right foot were obtained today that show a nondisplaced base of 5th metatarsal fracture, with routine healing.  There is no malalignment. There is no joint dislocation, or degenerative changes seen. No other obvious osseous abnormality. Otherwise unremarkable.\par \par

## 2020-02-12 ENCOUNTER — EMERGENCY (EMERGENCY)
Facility: HOSPITAL | Age: 60
LOS: 1 days | Discharge: HOME | End: 2020-02-12
Attending: EMERGENCY MEDICINE
Payer: MEDICAID

## 2020-02-12 VITALS
RESPIRATION RATE: 16 BRPM | DIASTOLIC BLOOD PRESSURE: 90 MMHG | TEMPERATURE: 98 F | SYSTOLIC BLOOD PRESSURE: 130 MMHG | OXYGEN SATURATION: 97 % | HEART RATE: 70 BPM | WEIGHT: 158.07 LBS | HEIGHT: 61 IN

## 2020-02-12 VITALS
TEMPERATURE: 98 F | OXYGEN SATURATION: 99 % | HEART RATE: 70 BPM | DIASTOLIC BLOOD PRESSURE: 92 MMHG | RESPIRATION RATE: 16 BRPM | SYSTOLIC BLOOD PRESSURE: 135 MMHG

## 2020-02-12 DIAGNOSIS — Z98.890 OTHER SPECIFIED POSTPROCEDURAL STATES: Chronic | ICD-10-CM

## 2020-02-12 LAB
ALBUMIN SERPL ELPH-MCNC: 4.5 G/DL — SIGNIFICANT CHANGE UP (ref 3.3–5)
ALP SERPL-CCNC: 69 U/L — SIGNIFICANT CHANGE UP (ref 40–120)
ALT FLD-CCNC: 25 U/L — SIGNIFICANT CHANGE UP (ref 10–45)
ANION GAP SERPL CALC-SCNC: 12 MMOL/L — SIGNIFICANT CHANGE UP (ref 5–17)
ANISOCYTOSIS BLD QL: SIGNIFICANT CHANGE UP
AST SERPL-CCNC: 15 U/L — SIGNIFICANT CHANGE UP (ref 10–40)
BASOPHILS # BLD AUTO: 0 K/UL — SIGNIFICANT CHANGE UP (ref 0–0.2)
BASOPHILS NFR BLD AUTO: 0 % — SIGNIFICANT CHANGE UP (ref 0–2)
BILIRUB SERPL-MCNC: 0.6 MG/DL — SIGNIFICANT CHANGE UP (ref 0.2–1.2)
BUN SERPL-MCNC: 14 MG/DL — SIGNIFICANT CHANGE UP (ref 7–23)
CALCIUM SERPL-MCNC: 9.7 MG/DL — SIGNIFICANT CHANGE UP (ref 8.4–10.5)
CHLORIDE SERPL-SCNC: 106 MMOL/L — SIGNIFICANT CHANGE UP (ref 96–108)
CO2 SERPL-SCNC: 25 MMOL/L — SIGNIFICANT CHANGE UP (ref 22–31)
CREAT SERPL-MCNC: 0.75 MG/DL — SIGNIFICANT CHANGE UP (ref 0.5–1.3)
D DIMER BLD IA.RAPID-MCNC: <150 NG/ML DDU — SIGNIFICANT CHANGE UP
DACRYOCYTES BLD QL SMEAR: SIGNIFICANT CHANGE UP
ELLIPTOCYTES BLD QL SMEAR: SLIGHT — SIGNIFICANT CHANGE UP
EOSINOPHIL # BLD AUTO: 0 K/UL — SIGNIFICANT CHANGE UP (ref 0–0.5)
EOSINOPHIL NFR BLD AUTO: 0 % — SIGNIFICANT CHANGE UP (ref 0–6)
GLUCOSE SERPL-MCNC: 94 MG/DL — SIGNIFICANT CHANGE UP (ref 70–99)
HCT VFR BLD CALC: 40.2 % — SIGNIFICANT CHANGE UP (ref 34.5–45)
HGB BLD-MCNC: 12 G/DL — SIGNIFICANT CHANGE UP (ref 11.5–15.5)
LYMPHOCYTES # BLD AUTO: 1.58 K/UL — SIGNIFICANT CHANGE UP (ref 1–3.3)
LYMPHOCYTES # BLD AUTO: 28.3 % — SIGNIFICANT CHANGE UP (ref 13–44)
MANUAL SMEAR VERIFICATION: SIGNIFICANT CHANGE UP
MCHC RBC-ENTMCNC: 19.9 PG — LOW (ref 27–34)
MCHC RBC-ENTMCNC: 29.9 GM/DL — LOW (ref 32–36)
MCV RBC AUTO: 66.6 FL — LOW (ref 80–100)
MICROCYTES BLD QL: SIGNIFICANT CHANGE UP
MONOCYTES # BLD AUTO: 0.1 K/UL — SIGNIFICANT CHANGE UP (ref 0–0.9)
MONOCYTES NFR BLD AUTO: 1.8 % — LOW (ref 2–14)
NEUTROPHILS # BLD AUTO: 3.31 K/UL — SIGNIFICANT CHANGE UP (ref 1.8–7.4)
NEUTROPHILS NFR BLD AUTO: 58.4 % — SIGNIFICANT CHANGE UP (ref 43–77)
NEUTS BAND # BLD: 0.9 % — SIGNIFICANT CHANGE UP (ref 0–8)
OVALOCYTES BLD QL SMEAR: SLIGHT — SIGNIFICANT CHANGE UP
PLAT MORPH BLD: NORMAL — SIGNIFICANT CHANGE UP
PLATELET # BLD AUTO: 209 K/UL — SIGNIFICANT CHANGE UP (ref 150–400)
POIKILOCYTOSIS BLD QL AUTO: SIGNIFICANT CHANGE UP
POTASSIUM SERPL-MCNC: 3.7 MMOL/L — SIGNIFICANT CHANGE UP (ref 3.5–5.3)
POTASSIUM SERPL-SCNC: 3.7 MMOL/L — SIGNIFICANT CHANGE UP (ref 3.5–5.3)
PROT SERPL-MCNC: 6.7 G/DL — SIGNIFICANT CHANGE UP (ref 6–8.3)
RBC # BLD: 6.04 M/UL — HIGH (ref 3.8–5.2)
RBC # FLD: 15.3 % — HIGH (ref 10.3–14.5)
RBC BLD AUTO: ABNORMAL
SCHISTOCYTES BLD QL AUTO: SLIGHT — SIGNIFICANT CHANGE UP
SODIUM SERPL-SCNC: 143 MMOL/L — SIGNIFICANT CHANGE UP (ref 135–145)
TARGETS BLD QL SMEAR: SLIGHT — SIGNIFICANT CHANGE UP
VARIANT LYMPHS # BLD: 10.6 % — HIGH (ref 0–6)
WBC # BLD: 5.58 K/UL — SIGNIFICANT CHANGE UP (ref 3.8–10.5)
WBC # FLD AUTO: 5.58 K/UL — SIGNIFICANT CHANGE UP (ref 3.8–10.5)

## 2020-02-12 PROCEDURE — 71045 X-RAY EXAM CHEST 1 VIEW: CPT | Mod: 26

## 2020-02-12 PROCEDURE — 80053 COMPREHEN METABOLIC PANEL: CPT

## 2020-02-12 PROCEDURE — 71045 X-RAY EXAM CHEST 1 VIEW: CPT

## 2020-02-12 PROCEDURE — 85027 COMPLETE CBC AUTOMATED: CPT

## 2020-02-12 PROCEDURE — 93005 ELECTROCARDIOGRAM TRACING: CPT

## 2020-02-12 PROCEDURE — 85379 FIBRIN DEGRADATION QUANT: CPT

## 2020-02-12 PROCEDURE — 93010 ELECTROCARDIOGRAM REPORT: CPT

## 2020-02-12 PROCEDURE — 99283 EMERGENCY DEPT VISIT LOW MDM: CPT

## 2020-02-12 PROCEDURE — 99285 EMERGENCY DEPT VISIT HI MDM: CPT

## 2020-02-12 NOTE — ED PROVIDER NOTE - NS ED ROS FT
CONSTITUTIONAL: no fevers  HEENT: no dysphagia  CV: no chest pain  RESP: + SOB  GI: no nausea/vomiting  : no dysuria  DERM: no rash  MSK: no back pain  NEURO: no LOC  ENDO: no diabetes

## 2020-02-12 NOTE — ED PROVIDER NOTE - RAPID ASSESSMENT
Noah Hoffman rapid assessment documentation for Kirill Costello (PA-C):    59 year old female with pmhx GERD, gastritis and pshx ankle surgery presents to the ED c/o SOB, cough. +SOB, wheezing, chills. Was here x16 days ago for double fracture to right foot. Has been sedentary since then and noticed that she was experiencing bronchitis and general nasal congestion starting today. Went to , referred to [facility] ED to r/o DVT. Denies smoking. Denies fever, phlegm. Also notes throbbing pain to the right leg. Noah Hoffman rapid assessment documentation for Kirill Costello (PA-C):    59 year old female with pmhx GERD, gastritis and pshx ankle surgery presents to the ED c/o SOB, cough. +SOB, wheezing, chills. Was here x16 days ago for double fracture to right foot. Has been sedentary since then and noticed that she was experiencing bronchitis and general nasal congestion starting today. Went to , referred to [facility] ED to r/o blood clot with DDimer. Denies smoking. Denies fever, phlegm. Also notes throbbing pain to the right leg.

## 2020-02-12 NOTE — ED ADULT NURSE NOTE - OBJECTIVE STATEMENT
Pt is a 59y female c/o of sob and cough x1 day and "conjunctivitis". Pt expressed she visited urgent care today because her eyes felt as if she had "conjunctivitis" because they were difficult to open this morning. When at ; was told that her SPO2 was at 90% and heard wheezing. Pt given nebulizers and was instructed to come to ED for further workup and "to rule out a clot". Pt breathing unlabored, SPO2 99%, no s/s distress, no sob noted. Pt also c/o of left knee pain and swelling; pt stated she had a fall a few weeks ago and has a right foot fracture. Pt wearing stabilizing boot. No c/o cp, palpitations, n/v, diarrhea.

## 2020-02-12 NOTE — ED PROVIDER NOTE - NSFOLLOWUPINSTRUCTIONS_ED_ALL_ED_FT
FOLLOW UP WITH YOUR PRIMARY CARE DOCTOR TOMORROW REGARDING TONIGHT'S ED VISIT.    A COPY OF YOUR LAB WORK AND IMAGING REPORT IS ENCLOSED.    RETURN TO THE ED FOR ANY NEW OR WORSENING SYMPTOMS.

## 2020-02-12 NOTE — ED PROVIDER NOTE - OBJECTIVE STATEMENT
58 yo female hx of recent R foot fx went to urgent care today for cough, congestion, eye tearing, runny nose.  son has same symptoms.  was referred to [facility] for "rule out blood clot".  denies CP, intermittent SOB associated with nonproductive cough, + chills, no hemoptysis, no calf pain/swelling.

## 2020-02-12 NOTE — ED PROVIDER NOTE - PATIENT PORTAL LINK FT
You can access the FollowMyHealth Patient Portal offered by [facility] by registering at the following website: [email or web address]. By joining [facility] FollowMyHealth portal, you will also be able to view your health information using other applications (apps) compatible with our system.

## 2020-02-12 NOTE — CHART NOTE - NSCHARTNOTEFT_GEN_A_CORE
Emergency Social Work Note:  LMSW received a referral for assistance with discharge planning. Per medical patient is medically cleared for discharge.  LMSW introduced herself to the patient and verbalized understanding her role.  Patient has [facility]  Medicaid insurance benefit.  ([phone]) Authorization number provided: [id number]. Transportation provider to be assigned. Patient was made aware transportation may take up to 4 hours to arrive to the hospital. Patient declined to identify a primary caregiver.   LMSW will continue to follow up as needed.

## 2020-02-12 NOTE — ED ADULT NURSE NOTE - CHPI ED NUR SYMPTOMS NEG
no chills/no decreased eating/drinking/no fever/no nausea/no dizziness/no vomiting/no tingling/no weakness

## 2020-02-12 NOTE — ED PROVIDER NOTE - PHYSICAL EXAMINATION
GEN: calm, cooperative  EYES: EOM intact  ENT: mucous membranes moist  HEAD: NCAT  CV: S1 S2   RESP: no respiratory distress, lungs clear  ABD: no abdominal distension  MSK: moves all extremities  NEURO: awake, alert, oriented  PSYCH: affect normal  SKIN: warm

## 2020-02-12 NOTE — ED ADULT NURSE NOTE - CAS DISCH TRANSFER METHOD
Transportation service/Pt said she will call [facility].

## 2020-02-13 ENCOUNTER — APPOINTMENT (OUTPATIENT)
Dept: ORTHOPEDIC SURGERY | Facility: CLINIC | Age: 60
End: 2020-02-13

## 2020-02-27 ENCOUNTER — APPOINTMENT (OUTPATIENT)
Dept: ORTHOPEDIC SURGERY | Facility: CLINIC | Age: 60
End: 2020-02-27

## 2020-04-08 ENCOUNTER — APPOINTMENT (OUTPATIENT)
Dept: OTOLARYNGOLOGY | Facility: CLINIC | Age: 60
End: 2020-04-08

## 2020-05-21 ENCOUNTER — TRANSCRIPTION ENCOUNTER (OUTPATIENT)
Age: 60
End: 2020-05-21

## 2020-06-30 ENCOUNTER — APPOINTMENT (OUTPATIENT)
Dept: ORTHOPEDIC SURGERY | Facility: CLINIC | Age: 60
End: 2020-06-30
Payer: MEDICAID

## 2020-06-30 ENCOUNTER — APPOINTMENT (OUTPATIENT)
Dept: GASTROENTEROLOGY | Facility: CLINIC | Age: 60
End: 2020-06-30
Payer: MEDICAID

## 2020-06-30 VITALS
SYSTOLIC BLOOD PRESSURE: 127 MMHG | HEIGHT: 61 IN | BODY MASS INDEX: 32.1 KG/M2 | HEART RATE: 77 BPM | WEIGHT: 170 LBS | DIASTOLIC BLOOD PRESSURE: 87 MMHG | TEMPERATURE: 98.7 F

## 2020-06-30 VITALS
SYSTOLIC BLOOD PRESSURE: 148 MMHG | BODY MASS INDEX: 32.1 KG/M2 | HEIGHT: 61 IN | TEMPERATURE: 99 F | HEART RATE: 80 BPM | WEIGHT: 170 LBS | DIASTOLIC BLOOD PRESSURE: 101 MMHG

## 2020-06-30 DIAGNOSIS — R14.0 ABDOMINAL DISTENSION (GASEOUS): ICD-10-CM

## 2020-06-30 DIAGNOSIS — K62.4 STENOSIS OF ANUS AND RECTUM: ICD-10-CM

## 2020-06-30 DIAGNOSIS — Z87.898 PERSONAL HISTORY OF OTHER SPECIFIED CONDITIONS: ICD-10-CM

## 2020-06-30 DIAGNOSIS — R10.30 LOWER ABDOMINAL PAIN, UNSPECIFIED: ICD-10-CM

## 2020-06-30 PROCEDURE — 99214 OFFICE O/P EST MOD 30 MIN: CPT

## 2020-06-30 PROCEDURE — 99214 OFFICE O/P EST MOD 30 MIN: CPT | Mod: 25

## 2020-06-30 PROCEDURE — 82272 OCCULT BLD FECES 1-3 TESTS: CPT

## 2020-06-30 PROCEDURE — 73130 X-RAY EXAM OF HAND: CPT | Mod: RT

## 2020-06-30 PROCEDURE — 20550 NJX 1 TENDON SHEATH/LIGAMENT: CPT | Mod: F8

## 2020-06-30 RX ORDER — CLONAZEPAM 2 MG/1
TABLET ORAL
Refills: 0 | Status: DISCONTINUED | COMMUNITY
End: 2020-06-30

## 2020-06-30 RX ORDER — ATORVASTATIN CALCIUM 20 MG/1
20 TABLET, FILM COATED ORAL
Refills: 0 | Status: DISCONTINUED | COMMUNITY
End: 2020-06-30

## 2020-06-30 RX ORDER — CLINDAMYCIN HYDROCHLORIDE 300 MG/1
300 CAPSULE ORAL
Qty: 21 | Refills: 0 | Status: DISCONTINUED | COMMUNITY
Start: 2020-01-06 | End: 2020-06-30

## 2020-06-30 RX ORDER — LEVOCETIRIZINE DIHYDROCHLORIDE 5 MG/1
5 TABLET ORAL DAILY
Qty: 90 | Refills: 2 | Status: DISCONTINUED | COMMUNITY
Start: 2019-04-04 | End: 2020-06-30

## 2020-06-30 NOTE — HISTORY OF PRESENT ILLNESS
[FreeTextEntry1] : Sanjuana completed a course of antibiotics for UTI a couple of weeks ago.  However, she noted residual lower abdominal and low back pressure discomfort, with bloating, urinary urgency, and constipation.  She has been more sedentary lately, between the pandemic and fracturing her foot, and neck pain.  She started stool softener and Senokot, with incomplete relief.  She also reports excessive burping and flatulence but denies fever, nausea, or vomiting.

## 2020-06-30 NOTE — REASON FOR VISIT
[Follow-Up: _____] : a [unfilled] follow-up visit [FreeTextEntry1] : Pt c/o abdominal discomfort since 2 weeks ago after having course of antibiotics for UTI, also constipation, bloating.  Pt denies n/v.

## 2020-06-30 NOTE — PHYSICAL EXAM
[General Appearance - Alert] : alert [General Appearance - In No Acute Distress] : in no acute distress [General Appearance - Well Nourished] : well nourished [General Appearance - Well Developed] : well developed [Sclera] : the sclera and conjunctiva were normal [Neck Cervical Mass (___cm)] : no neck mass was observed [Neck Appearance] : the appearance of the neck was normal [Jugular Venous Distention Increased] : there was no jugular-venous distention [Thyroid Diffuse Enlargement] : the thyroid was not enlarged [Thyroid Nodule] : there were no palpable thyroid nodules [Auscultation Breath Sounds / Voice Sounds] : lungs were clear to auscultation bilaterally [Heart Rate And Rhythm] : heart rate was normal and rhythm regular [Heart Sounds Gallop] : no gallops [Heart Sounds] : normal S1 and S2 [Murmurs] : no murmurs [Heart Sounds Pericardial Friction Rub] : no pericardial rub [Edema] : there was no peripheral edema [Full Pulse] : the pedal pulses are present [Abdomen Soft] : soft [Abdomen Mass (___ Cm)] : no abdominal mass palpated [Abdomen Hernia] : no hernia was discovered [Diminished] : diminished [No Rectal Mass] : no rectal mass [Cervical Lymph Nodes Enlarged Posterior Bilaterally] : posterior cervical [Supraclavicular Lymph Nodes Enlarged Bilaterally] : supraclavicular [Cervical Lymph Nodes Enlarged Anterior Bilaterally] : anterior cervical [Axillary Lymph Nodes Enlarged Bilaterally] : axillary [Inguinal Lymph Nodes Enlarged Bilaterally] : inguinal [Femoral Lymph Nodes Enlarged Bilaterally] : femoral [Nail Clubbing] : no clubbing  or cyanosis of the fingernails [Musculoskeletal - Swelling] : no joint swelling seen [Skin Color & Pigmentation] : normal skin color and pigmentation [Skin Turgor] : normal skin turgor [Impaired Insight] : insight and judgment were intact [] : no rash [Oriented To Time, Place, And Person] : oriented to person, place, and time [Affect] : the affect was normal [Internal Hemorrhoid] : no internal hemorrhoids [External Hemorrhoid] : no external hemorrhoids [Occult Blood Positive] : stool was negative for occult blood [FreeTextEntry1] : mild anal stenosis

## 2020-06-30 NOTE — ASSESSMENT
[FreeTextEntry1] : 1. Recent lower abdominal pain, constipation, gassiness; IBS-C, with history of fecal impaction and stercoral colitis 2018, and only hemorrhoids at last colonoscopy August 2018; history of hyperplastic polyp; strong family history of colon cancer (brother, aunt) & colon polyps (mother)--probable IBS-C exacerbation, with recent UTIs/ileus, decreased activity, anal stenosis all contributory.  \par 2. Obesity.\par 3. Ex-smoker.\par 4. Chronic anemia from thalassemia trait.\par 5. History of pyelonephritis, recurrent UTIs.\par 6. Status post breast implants, T&A, left elbow surgery.\par 7. Allergic to sulfa, levofloxacin,  Advil.\par \par Plan:\par 1.  Medical records reviewed.\par 2.  Fleet enema x 2 this afternoon, followed by 1 bottle of citrate of magnesia.\par 3.  Dietary instruction given--increase fluids, low fiber diet until "adequate" BM.  Then, gradually reintroduce roughage; adding MiraLAX would also be an option.\par 4.  If symptoms continue to be problematic despite above, or if deterioration, she will let me know.\par 5.  Otherwise, return here in 1 year.\par \par

## 2020-06-30 NOTE — CONSULT LETTER
[Dear  ___] : Dear  [unfilled], [Please see my note below.] : Please see my note below. [Courtesy Letter:] : I had the pleasure of seeing your patient, [unfilled], in my office today. [Consult Closing:] : Thank you very much for allowing me to participate in the care of this patient.  If you have any questions, please do not hesitate to contact me. [Sincerely,] : Sincerely, [FreeTextEntry3] : Tl Carlton M.D.\par

## 2020-06-30 NOTE — PHYSICAL EXAM
[UE] : Sensory: Intact in bilateral upper extremities [Bicep] : biceps 2+ and symmetric bilaterally [Rad] : radial 2+ and symmetric bilaterally [B.R.] : biceps 2+ and symmetric bilaterally [Normal] : Alert and in no acute distress [Poor Appearance] : well-appearing [Acute Distress] : not in acute distress [Obese] : not obese [de-identified] : The patient has no respiratory distress. Mood and affect are normal. The patient is alert and oriented to person, place and time.\par The patient has pain with motion of the cervical spine.  There is no muscle spasm.  Motion of the cervical spine creates pain radiating down both upper extremities.  The elbows are stable and nontender.  Examination of the hands demonstrates tenderness of the flexor tendon surface to the right ring finger.  There is mild triggering.  There is lesser tenderness of the long finger.  There is no tenderness of the left hand.  Capillary refill is brisk.  Tendon function is intact.  The skin is intact.  There is no lymphedema. [de-identified] : AP, lateral and oblique x-rays of the right hand demonstrate no fracture, no dislocation and no bony abnormality.

## 2020-06-30 NOTE — DISCUSSION/SUMMARY
[de-identified] : The patient has triggering of the right ring finger.  I have discussed the pathology, natural history and treatment options with her.  She is allergic to NSAIDs.  She will except a steroid injection.  With informed consent and a sterile prep and injection is placed into the flexor surface of the right ring finger at the A1 pulley.  Injection consisted of 1 cc of Depo-Medrol and 1 cc of 1% Xylocaine.  The injection was well-tolerated.  Instructions were given.  She is advised to follow-up with Dr. Mae regarding her cervical spine problem.

## 2020-06-30 NOTE — REVIEW OF SYSTEMS
[Recent Weight Gain (___ Lbs)] : recent [unfilled] ~Ulb weight gain [Eyes Itch] : itching of the eyes [Dry Eyes] : dryness of the eyes [Red Eyes] : red eyes [Wheezing] : wheezing [Earache] : earache [Constipation] : constipation [Abdominal Pain] : abdominal pain [Heartburn] : heartburn [Pelvic Pain] : pelvic pain [Joint Pain] : joint pain [Joint Swelling] : joint swelling [Anxiety] : anxiety [Joint Stiffness] : joint stiffness [Negative] : Endocrine [As Noted in HPI] : as noted in HPI [FreeTextEntry5] : leg pain [FreeTextEntry8] : UTI  [de-identified] : both arm tingling and numbness

## 2020-06-30 NOTE — HISTORY OF PRESENT ILLNESS
[de-identified] : 59 year old RHD female with history of right hand flexor tenosynovitis presents today c/o increasing right hand pain x 6 months. She reports triggering of her right ring finger. She has difficulty making a tight fist. She works as a  for [employer] and is often dropping food. She also reports numbness and tingling of both upper extremities which is worse in the morning. She has a history of cervical stenosis and has been seen in the past by Dr. Mae.

## 2020-07-08 ENCOUNTER — APPOINTMENT (OUTPATIENT)
Dept: ORTHOPEDIC SURGERY | Facility: CLINIC | Age: 60
End: 2020-07-08
Payer: MEDICAID

## 2020-07-08 VITALS — TEMPERATURE: 98 F

## 2020-07-08 VITALS — WEIGHT: 170 LBS | BODY MASS INDEX: 32.1 KG/M2 | HEIGHT: 61 IN

## 2020-07-08 PROCEDURE — 99214 OFFICE O/P EST MOD 30 MIN: CPT

## 2020-07-08 PROCEDURE — 72040 X-RAY EXAM NECK SPINE 2-3 VW: CPT

## 2020-07-08 NOTE — PHYSICAL EXAM
[de-identified] : Examination of the cervical spine reveals no midline or paraspinal tenderness to palpation. No cervical lymphadenopathy. Decreased range of motion with respect to flexion, extension, rotation, and lateral bending. Negative Spurlings. Negative Lhermitte's. Full range of motion bilateral shoulders without evidence of impingement. No instability of bilateral upper extremities.  Cranial nerves II through XII grossly intact. Intact sensation bilateral upper extremities. 5/5 deltoids biceps triceps wrist extensors wrist flexors finger flexors and hand intrinsics. 1+ biceps triceps and brachioradialis reflexes. Negative Alcaraz's. 2+ radial pulse. Negative Tinel's over the cubital and carpal tunnel. No skin lesions on the right and left upper extremities. [de-identified] : AP lateral cervical x-rays reveal some spondylosis.\par \par Previous cervical spine MRI does demonstrate some water stenosis at C3-4

## 2020-07-08 NOTE — DISCUSSION/SUMMARY
[de-identified] : Given her worsening symptoms and opted a cervical spine MRI will be obtained. Follow up afterwards.

## 2020-07-08 NOTE — HISTORY OF PRESENT ILLNESS
[de-identified] : Ms. BIN BISHOP  is a 59 year old female who presents with a few months of bilateral UE heaviness, pain, and decreased hand dexterity.  She also has neck pain.  Her symptoms are not improving. Normal bowel and bladder control.   Denies any recent fevers, chills, sweats, weight loss, or infection.\par \par The patients past medical history, past surgical history, medications, allergies, and social history were reviewed by me today with the patient and documented accordingly.  In addition, the patient's family history, which is noncontributory to their visit, was also reviewed.\par

## 2020-07-14 ENCOUNTER — APPOINTMENT (OUTPATIENT)
Dept: ULTRASOUND IMAGING | Facility: CLINIC | Age: 60
End: 2020-07-14
Payer: MEDICAID

## 2020-07-14 ENCOUNTER — OUTPATIENT (OUTPATIENT)
Dept: OUTPATIENT SERVICES | Facility: HOSPITAL | Age: 60
LOS: 1 days | End: 2020-07-14

## 2020-07-14 DIAGNOSIS — R10.11 RIGHT UPPER QUADRANT PAIN: ICD-10-CM

## 2020-07-14 DIAGNOSIS — Z98.890 OTHER SPECIFIED POSTPROCEDURAL STATES: Chronic | ICD-10-CM

## 2020-07-14 PROCEDURE — 76700 US EXAM ABDOM COMPLETE: CPT | Mod: 26

## 2020-07-24 ENCOUNTER — RESULT REVIEW (OUTPATIENT)
Age: 60
End: 2020-07-24

## 2020-07-24 ENCOUNTER — OUTPATIENT (OUTPATIENT)
Dept: OUTPATIENT SERVICES | Facility: HOSPITAL | Age: 60
LOS: 1 days | End: 2020-07-24
Payer: MEDICAID

## 2020-07-24 ENCOUNTER — APPOINTMENT (OUTPATIENT)
Dept: ULTRASOUND IMAGING | Facility: CLINIC | Age: 60
End: 2020-07-24
Payer: MEDICAID

## 2020-07-24 ENCOUNTER — APPOINTMENT (OUTPATIENT)
Dept: MRI IMAGING | Facility: CLINIC | Age: 60
End: 2020-07-24
Payer: MEDICAID

## 2020-07-24 ENCOUNTER — APPOINTMENT (OUTPATIENT)
Dept: MAMMOGRAPHY | Facility: CLINIC | Age: 60
End: 2020-07-24
Payer: MEDICAID

## 2020-07-24 DIAGNOSIS — Z12.39 ENCOUNTER FOR OTHER SCREENING FOR MALIGNANT NEOPLASM OF BREAST: ICD-10-CM

## 2020-07-24 DIAGNOSIS — Z00.8 ENCOUNTER FOR OTHER GENERAL EXAMINATION: ICD-10-CM

## 2020-07-24 DIAGNOSIS — Z98.890 OTHER SPECIFIED POSTPROCEDURAL STATES: Chronic | ICD-10-CM

## 2020-07-24 DIAGNOSIS — M54.12 RADICULOPATHY, CERVICAL REGION: ICD-10-CM

## 2020-07-24 PROCEDURE — 76641 ULTRASOUND BREAST COMPLETE: CPT | Mod: 26,50

## 2020-07-24 PROCEDURE — 77067 SCR MAMMO BI INCL CAD: CPT

## 2020-07-24 PROCEDURE — 72141 MRI NECK SPINE W/O DYE: CPT | Mod: 26

## 2020-07-24 PROCEDURE — 76641 ULTRASOUND BREAST COMPLETE: CPT

## 2020-07-24 PROCEDURE — 77067 SCR MAMMO BI INCL CAD: CPT | Mod: 26

## 2020-07-24 PROCEDURE — 77063 BREAST TOMOSYNTHESIS BI: CPT | Mod: 26

## 2020-07-24 PROCEDURE — 77063 BREAST TOMOSYNTHESIS BI: CPT

## 2020-07-24 PROCEDURE — 72141 MRI NECK SPINE W/O DYE: CPT

## 2020-10-14 ENCOUNTER — APPOINTMENT (OUTPATIENT)
Dept: ORTHOPEDIC SURGERY | Facility: CLINIC | Age: 60
End: 2020-10-14
Payer: MEDICAID

## 2020-10-14 PROCEDURE — 99214 OFFICE O/P EST MOD 30 MIN: CPT

## 2020-10-23 NOTE — DISCUSSION/SUMMARY
[de-identified] : We discussed further treatment options.  We reviewed her imaging.  She wished to try some physical therapy.  Prescription was given.  Follow-up afterwards.

## 2020-10-23 NOTE — PHYSICAL EXAM
[de-identified] : Examination of the cervical spine reveals no midline or paraspinal tenderness to palpation. No cervical lymphadenopathy. Decreased range of motion with respect to flexion, extension, rotation, and lateral bending. Negative Spurlings. Negative Lhermitte's. Full range of motion bilateral shoulders without evidence of impingement. No instability of bilateral upper extremities.  Cranial nerves II through XII grossly intact. Intact sensation bilateral upper extremities. 5/5 deltoids biceps triceps wrist extensors wrist flexors finger flexors and hand intrinsics. 1+ biceps triceps and brachioradialis reflexes. Negative Alcaraz's. 2+ radial pulse. Negative Tinel's over the cubital and carpal tunnel. No skin lesions on the right and left upper extremities. [de-identified] : AP lateral cervical x-rays reveal some spondylosis.\par \par Cervical spine MRI reveals areas of mild stenosis without high-grade cord compression or spinal cord signal abnormality

## 2020-10-23 NOTE — HISTORY OF PRESENT ILLNESS
[de-identified] : Ms. BIN BISHOP  is a 59 year old female who presents to the office for a follow-up visit.  She is here to review her cervical MRI. \par

## 2020-12-21 PROBLEM — Z01.419 ENCOUNTER FOR ANNUAL ROUTINE GYNECOLOGICAL EXAMINATION: Status: RESOLVED | Noted: 2019-11-14 | Resolved: 2020-12-21

## 2020-12-23 ENCOUNTER — OUTPATIENT (OUTPATIENT)
Dept: OUTPATIENT SERVICES | Facility: HOSPITAL | Age: 60
LOS: 1 days | End: 2020-12-23
Payer: COMMERCIAL

## 2020-12-23 ENCOUNTER — APPOINTMENT (OUTPATIENT)
Dept: CT IMAGING | Facility: CLINIC | Age: 60
End: 2020-12-23
Payer: OTHER MISCELLANEOUS

## 2020-12-23 DIAGNOSIS — Z98.890 OTHER SPECIFIED POSTPROCEDURAL STATES: Chronic | ICD-10-CM

## 2020-12-23 DIAGNOSIS — Z00.8 ENCOUNTER FOR OTHER GENERAL EXAMINATION: ICD-10-CM

## 2020-12-23 PROCEDURE — 73700 CT LOWER EXTREMITY W/O DYE: CPT | Mod: 26,RT

## 2020-12-23 PROCEDURE — 73700 CT LOWER EXTREMITY W/O DYE: CPT

## 2021-01-26 ENCOUNTER — APPOINTMENT (OUTPATIENT)
Dept: ALLERGY | Facility: CLINIC | Age: 61
End: 2021-01-26
Payer: MEDICAID

## 2021-01-26 VITALS
WEIGHT: 169 LBS | SYSTOLIC BLOOD PRESSURE: 137 MMHG | HEART RATE: 72 BPM | BODY MASS INDEX: 31.91 KG/M2 | HEIGHT: 61 IN | OXYGEN SATURATION: 97 % | DIASTOLIC BLOOD PRESSURE: 91 MMHG

## 2021-01-26 PROCEDURE — 99072 ADDL SUPL MATRL&STAF TM PHE: CPT

## 2021-01-26 PROCEDURE — 99214 OFFICE O/P EST MOD 30 MIN: CPT

## 2021-01-26 NOTE — PHYSICAL EXAM
[Alert] : alert [Well Nourished] : well nourished [Healthy Appearance] : healthy appearance [No Acute Distress] : no acute distress [Well Developed] : well developed [Normal Pupil & Iris Size/Symmetry] : normal pupil and iris size and symmetry [No Discharge] : no discharge [No Photophobia] : no photophobia [Sclera Not Icteric] : sclera not icteric [No Neck Mass] : no neck mass was observed [No LAD] : no lymphadenopathy [No Thyroid Mass] : no thyroid mass [Supple] : the neck was supple [Normal Rate and Effort] : normal respiratory rhythm and effort [No Crackles] : no crackles [No Retractions] : no retractions [Bilateral Audible Breath Sounds] : bilateral audible breath sounds [Normal Rate] : heart rate was normal  [Normal S1, S2] : normal S1 and S2 [No murmur] : no murmur [Regular Rhythm] : with a regular rhythm [Normal Cervical Lymph Nodes] : cervical [Skin Intact] : skin intact  [No Rash] : no rash [No Skin Lesions] : no skin lesions [No clubbing] : no clubbing [No Edema] : no edema [No Cyanosis] : no cyanosis [Normal Mood] : mood was normal [Normal Affect] : affect was normal [Alert, Awake, Oriented as Age-Appropriate] : alert, awake, oriented as age appropriate [Pale mucosa] : no pale mucosa

## 2021-01-26 NOTE — REVIEW OF SYSTEMS
[Heartburn] : heartburn [Diarrhea] : diarrhea [Anxiety] : anxiety [Nl] : Genitourinary [de-identified] : increased anxiety

## 2021-01-26 NOTE — REVIEW OF SYSTEMS
[Heartburn] : heartburn [Diarrhea] : diarrhea [Anxiety] : anxiety [Nl] : Genitourinary [de-identified] : increased anxiety

## 2021-01-26 NOTE — SOCIAL HISTORY
Call to Malina at Dr. Luna's office - pt still needs a UA prior to his 11/12/2019 surgery.  There is no order for it in [facility].  Malina placed the order and pt was told to come in and have the UA done; he states he will do it today or tomorrow.  Also, pt's cardiologist in his clearance office visit said pt to remain on 81mg ASA and stop Coumadin 5 days prior to surgery.  Per Malina, she discussed this with URVASHI Aragon and it is ok for pt to remain on his 81mg ASA prior to surgery.  Pt notified of this; he verbalizes understanding.   [None] : none [] :  [de-identified] : lives alone [FreeTextEntry2] : Presently unemployed  [Smokers in Household] : there are no smokers in the home

## 2021-01-26 NOTE — SOCIAL HISTORY
[None] : none [] :  [de-identified] : lives alone [FreeTextEntry2] : Presently unemployed  [Smokers in Household] : there are no smokers in the home

## 2021-01-26 NOTE — HISTORY OF PRESENT ILLNESS
[de-identified] : Patient had been doing extremely well - she fractured her right foot and symptoms improving now - increased anxiety problems and she was prescribed clonazepam - melatonin - with increased anxiety she is starting to have increased rash with itching - she feels that her symptoms are related to foods - she is taking pantoprazole for reflux symptoms.

## 2021-01-26 NOTE — ASSESSMENT
[FreeTextEntry1] : Exacerbation of urticaria most likely secondary to stress:\par \par Allegra 180 mg QAM\par Xyzal 5 mg QHS \par \par GERD:\par \par Continue pantoprazole in the AM\par Add Pepcid 20 mg in the PM\par Follow up with GI \par \par RV prn symptoms

## 2021-01-26 NOTE — HISTORY OF PRESENT ILLNESS
[de-identified] : Patient had been doing extremely well - she fractured her right foot and symptoms improving now - increased anxiety problems and she was prescribed clonazepam - melatonin - with increased anxiety she is starting to have increased rash with itching - she feels that her symptoms are related to foods - she is taking pantoprazole for reflux symptoms.

## 2021-07-27 ENCOUNTER — APPOINTMENT (OUTPATIENT)
Dept: ALLERGY | Facility: CLINIC | Age: 61
End: 2021-07-27
Payer: MEDICAID

## 2021-07-27 ENCOUNTER — RESULT CHARGE (OUTPATIENT)
Age: 61
End: 2021-07-27

## 2021-07-27 VITALS
WEIGHT: 169 LBS | OXYGEN SATURATION: 98 % | SYSTOLIC BLOOD PRESSURE: 114 MMHG | HEIGHT: 61 IN | DIASTOLIC BLOOD PRESSURE: 79 MMHG | HEART RATE: 80 BPM | BODY MASS INDEX: 31.91 KG/M2

## 2021-07-27 PROCEDURE — 99214 OFFICE O/P EST MOD 30 MIN: CPT

## 2021-07-27 PROCEDURE — 99072 ADDL SUPL MATRL&STAF TM PHE: CPT

## 2021-07-27 RX ORDER — ROSUVASTATIN CALCIUM 5 MG/1
5 TABLET, FILM COATED ORAL
Refills: 0 | Status: DISCONTINUED | COMMUNITY
End: 2021-07-27

## 2021-07-27 NOTE — ASSESSMENT
[FreeTextEntry1] : Mild intermittent asthma:\par \par Ventolin 2 puffs QID prn \par \par Chronic urticaria:\par \par Continue Allegra 180 mg QAM\par Restart Xyzal 5 mg QHS

## 2021-07-27 NOTE — HISTORY OF PRESENT ILLNESS
[Allergic Rhinitis] : allergic rhinitis [Eczematous rashes] : eczematous rashes [Venom Reactions] : venom reactions [Food Allergies] : food allergies [de-identified] : Patient with chronic urticaria - she is taking only Allegra - she ran out of Xyzal.  She tested positive for Lyme and treated with Doxycycline for 10 days.    Patient with a lot of anxiety on clonazepam prn symptoms.   She is off of pantoprazole and Pepcid and she is under good control without medications.

## 2021-07-27 NOTE — SOCIAL HISTORY
[de-identified] : lives alone [FreeTextEntry2] : Presently unemployed  [None] : none [] :  [Smokers in Household] : there are no smokers in the home

## 2021-07-27 NOTE — SOCIAL HISTORY
[de-identified] : lives alone [FreeTextEntry2] : Presently unemployed  [None] : none [] :  [Smokers in Household] : there are no smokers in the home

## 2021-07-27 NOTE — HISTORY OF PRESENT ILLNESS
[Allergic Rhinitis] : allergic rhinitis [Eczematous rashes] : eczematous rashes [Venom Reactions] : venom reactions [Food Allergies] : food allergies [de-identified] : Patient with chronic urticaria - she is taking only Allegra - she ran out of Xyzal.  She tested positive for Lyme and treated with Doxycycline for 10 days.    Patient with a lot of anxiety on clonazepam prn symptoms.   She is off of pantoprazole and Pepcid and she is under good control without medications.

## 2021-07-27 NOTE — PHYSICAL EXAM
[Alert] : alert [Well Nourished] : well nourished [Healthy Appearance] : healthy appearance [No Acute Distress] : no acute distress [Well Developed] : well developed [Normal Pupil & Iris Size/Symmetry] : normal pupil and iris size and symmetry [No Discharge] : no discharge [No Photophobia] : no photophobia [Sclera Not Icteric] : sclera not icteric [Pale mucosa] : no pale mucosa [No Neck Mass] : no neck mass was observed [No LAD] : no lymphadenopathy [No Thyroid Mass] : no thyroid mass [Supple] : the neck was supple [Normal Rate and Effort] : normal respiratory rhythm and effort [No Crackles] : no crackles [No Retractions] : no retractions [Bilateral Audible Breath Sounds] : bilateral audible breath sounds [Normal Rate] : heart rate was normal  [Normal S1, S2] : normal S1 and S2 [No murmur] : no murmur [Regular Rhythm] : with a regular rhythm [Normal Cervical Lymph Nodes] : cervical [Skin Intact] : skin intact  [No Rash] : no rash [No Skin Lesions] : no skin lesions [No clubbing] : no clubbing [No Edema] : no edema [No Cyanosis] : no cyanosis [Normal Mood] : mood was normal [Normal Affect] : affect was normal [Alert, Awake, Oriented as Age-Appropriate] : alert, awake, oriented as age appropriate

## 2021-07-27 NOTE — REVIEW OF SYSTEMS
[Heartburn] : heartburn [Diarrhea] : diarrhea [Anxiety] : anxiety [Nl] : Genitourinary [de-identified] : increased anxiety

## 2021-07-27 NOTE — REVIEW OF SYSTEMS
[Heartburn] : heartburn [Diarrhea] : diarrhea [Anxiety] : anxiety [Nl] : Genitourinary [de-identified] : increased anxiety

## 2021-07-28 ENCOUNTER — NON-APPOINTMENT (OUTPATIENT)
Age: 61
End: 2021-07-28

## 2021-07-28 ENCOUNTER — LABORATORY RESULT (OUTPATIENT)
Age: 61
End: 2021-07-28

## 2021-07-28 ENCOUNTER — APPOINTMENT (OUTPATIENT)
Dept: FAMILY MEDICINE | Facility: CLINIC | Age: 61
End: 2021-07-28
Payer: MEDICAID

## 2021-07-28 VITALS
DIASTOLIC BLOOD PRESSURE: 70 MMHG | BODY MASS INDEX: 32.47 KG/M2 | HEIGHT: 61 IN | WEIGHT: 172 LBS | OXYGEN SATURATION: 98 % | TEMPERATURE: 97.2 F | SYSTOLIC BLOOD PRESSURE: 118 MMHG | HEART RATE: 82 BPM

## 2021-07-28 DIAGNOSIS — Z00.00 ENCOUNTER FOR GENERAL ADULT MEDICAL EXAMINATION W/OUT ABNORMAL FINDINGS: ICD-10-CM

## 2021-07-28 PROCEDURE — 99386 PREV VISIT NEW AGE 40-64: CPT | Mod: 25

## 2021-07-28 PROCEDURE — 99072 ADDL SUPL MATRL&STAF TM PHE: CPT

## 2021-07-28 PROCEDURE — 36415 COLL VENOUS BLD VENIPUNCTURE: CPT

## 2021-07-28 PROCEDURE — 93000 ELECTROCARDIOGRAM COMPLETE: CPT

## 2021-07-28 NOTE — HEALTH RISK ASSESSMENT
[Patient reported mammogram was normal] : Patient reported mammogram was normal [Patient reported PAP Smear was normal] : Patient reported PAP Smear was normal [Patient reported colonoscopy was abnormal] : Patient reported colonoscopy was abnormal [Alone] : lives alone [On disability] : on disability [College] : College [] :  [# Of Children ___] : has [unfilled] children [Sexually Active] : sexually active [Feels Safe at Home] : Feels safe at home [Reports changes in vision] : Reports changes in vision [Smoke Detector] : smoke detector [Carbon Monoxide Detector] : carbon monoxide detector [Safety elements used in home] : safety elements used in home [Seat Belt] :  uses seat belt [Sunscreen] : uses sunscreen [Change in mental status noted] : No change in mental status noted [High Risk Behavior] : no high risk behavior [Reports changes in hearing] : Reports no changes in hearing [Reports normal functional visual acuity (ie: able to read med bottle)] : Reports poor functional visual acuity.  [Reports changes in dental health] : Reports no changes in dental health [Travel to Developing Areas] : does not  travel to developing areas [TB Exposure] : is not being exposed to tuberculosis [MammogramDate] : 2020 [MammogramComments] : scheduled for 8/2021 [PapSmearDate] : 2020 [ColonoscopyDate] : 2020 [ColonoscopyComments] : 2 polyps- repeat 2022 [FreeTextEntry2] :  (s/p fall at work)

## 2021-07-28 NOTE — PLAN
[FreeTextEntry1] : Patient is advised to continue with her current diet and exercise, along with her current rx management. She  is also advised to make sure that she follows up with the ophthalmologist and dentist annually, which she is due for. She is also advised to make sure to also follow up with the dermatologist for routine skin checks, which she is utd with.  She is also advised to make sure that she exercises when possible, and follows up for any medical issues that arise. She has some limitations due to her foot injury, but she is working on it. Her labs will be checked as noted above, and further med adjustments will be made at that time.  All vaccines are currently utd.\par \par She will take the Crestor with the CoQ10, and she will continue to lessen the excess acids in her diet.  She will also start the Levocetirizine prescribed by her allergist for her rhinitis, and will follow up if there is no improvement.\par \par Patient's ECG is slightly abnormal, indicating an abnormal axis, and possible pulmonary disease.  As such, she was advised to follow up with Dr. Camp at [facility] (her cardio) for routine evaluation.  Given her recent increase in sob and fatigue, this is recommended.  SHe does agree, and will schedule. \par \par She will also complete the doxycycline as prescribed, and will take the probiotic as advised as well.  \par

## 2021-07-28 NOTE — HISTORY OF PRESENT ILLNESS
[FreeTextEntry1] : Routine PE [de-identified] : Patient states that her last PE was 1 year ago.  She is utd with the dentist and her last visit with the ophtho was 1 year ago.  She does have some blurred vision, and will be following up in the near future.  Her last visit with derm for a skin check was recent as well, and she was advised that she was normal.  She follows a low acid diet, and she exercises when possible.  \par \par She does also add that she was recently tested for lyme by her hematologist.  She has 4 out of 5 bands, and as she had felt symptomatic, she was started on doxy, which she is tolerating well.  She does also have chronic GERD.  She uses Protonix as prescribed, watches the acid in her diet and uses Famotidine as needed.

## 2021-08-02 ENCOUNTER — APPOINTMENT (OUTPATIENT)
Dept: MAMMOGRAPHY | Facility: CLINIC | Age: 61
End: 2021-08-02
Payer: MEDICAID

## 2021-08-02 ENCOUNTER — TRANSCRIPTION ENCOUNTER (OUTPATIENT)
Age: 61
End: 2021-08-02

## 2021-08-02 ENCOUNTER — APPOINTMENT (OUTPATIENT)
Dept: ULTRASOUND IMAGING | Facility: CLINIC | Age: 61
End: 2021-08-02
Payer: MEDICAID

## 2021-08-02 ENCOUNTER — OUTPATIENT (OUTPATIENT)
Dept: OUTPATIENT SERVICES | Facility: HOSPITAL | Age: 61
LOS: 1 days | End: 2021-08-02
Payer: MEDICAID

## 2021-08-02 DIAGNOSIS — Z98.890 OTHER SPECIFIED POSTPROCEDURAL STATES: Chronic | ICD-10-CM

## 2021-08-02 DIAGNOSIS — D56.3 THALASSEMIA MINOR: ICD-10-CM

## 2021-08-02 DIAGNOSIS — Z00.8 ENCOUNTER FOR OTHER GENERAL EXAMINATION: ICD-10-CM

## 2021-08-02 LAB
25(OH)D3 SERPL-MCNC: 51 NG/ML
ALBUMIN SERPL ELPH-MCNC: 4.6 G/DL
ALP BLD-CCNC: 63 U/L
ALT SERPL-CCNC: 24 U/L
ANION GAP SERPL CALC-SCNC: 11 MMOL/L
AST SERPL-CCNC: 19 U/L
BASOPHILS # BLD AUTO: 0.06 K/UL
BASOPHILS NFR BLD AUTO: 0.9 %
BILIRUB SERPL-MCNC: 0.6 MG/DL
BUN SERPL-MCNC: 13 MG/DL
CALCIUM SERPL-MCNC: 10 MG/DL
CHLORIDE SERPL-SCNC: 105 MMOL/L
CHOLEST SERPL-MCNC: 212 MG/DL
CO2 SERPL-SCNC: 26 MMOL/L
CREAT SERPL-MCNC: 0.8 MG/DL
EOSINOPHIL # BLD AUTO: 0.1 K/UL
EOSINOPHIL NFR BLD AUTO: 1.6 %
GLUCOSE SERPL-MCNC: 86 MG/DL
HCT VFR BLD CALC: 42.7 %
HDLC SERPL-MCNC: 52 MG/DL
HGB BLD-MCNC: 12.6 G/DL
IMM GRANULOCYTES NFR BLD AUTO: 1.6 %
LDLC SERPL CALC-MCNC: 136 MG/DL
LYMPHOCYTES # BLD AUTO: 2.07 K/UL
LYMPHOCYTES NFR BLD AUTO: 32.7 %
MAN DIFF?: NORMAL
MCHC RBC-ENTMCNC: 20.6 PG
MCHC RBC-ENTMCNC: 29.5 GM/DL
MCV RBC AUTO: 69.7 FL
MONOCYTES # BLD AUTO: 0.52 K/UL
MONOCYTES NFR BLD AUTO: 8.2 %
NEUTROPHILS # BLD AUTO: 3.48 K/UL
NEUTROPHILS NFR BLD AUTO: 55 %
NONHDLC SERPL-MCNC: 160 MG/DL
PLATELET # BLD AUTO: 231 K/UL
POTASSIUM SERPL-SCNC: 4.4 MMOL/L
PROT SERPL-MCNC: 6.5 G/DL
RBC # BLD: 6.13 M/UL
RBC # FLD: 17.6 %
SODIUM SERPL-SCNC: 141 MMOL/L
T3 SERPL-MCNC: 87 NG/DL
TRIGL SERPL-MCNC: 121 MG/DL
TSH SERPL-ACNC: 1.39 UIU/ML
VIT B12 SERPL-MCNC: 1076 PG/ML
WBC # FLD AUTO: 6.33 K/UL

## 2021-08-02 PROCEDURE — 77067 SCR MAMMO BI INCL CAD: CPT

## 2021-08-02 PROCEDURE — 76641 ULTRASOUND BREAST COMPLETE: CPT | Mod: 26,50

## 2021-08-02 PROCEDURE — 76641 ULTRASOUND BREAST COMPLETE: CPT

## 2021-08-02 PROCEDURE — 77063 BREAST TOMOSYNTHESIS BI: CPT

## 2021-08-02 PROCEDURE — 77067 SCR MAMMO BI INCL CAD: CPT | Mod: 26

## 2021-08-02 PROCEDURE — 77063 BREAST TOMOSYNTHESIS BI: CPT | Mod: 26

## 2021-08-10 ENCOUNTER — NON-APPOINTMENT (OUTPATIENT)
Age: 61
End: 2021-08-10

## 2021-08-12 ENCOUNTER — APPOINTMENT (OUTPATIENT)
Dept: FAMILY MEDICINE | Facility: CLINIC | Age: 61
End: 2021-08-12

## 2021-08-13 ENCOUNTER — APPOINTMENT (OUTPATIENT)
Dept: FAMILY MEDICINE | Facility: CLINIC | Age: 61
End: 2021-08-13
Payer: MEDICAID

## 2021-08-13 VITALS
DIASTOLIC BLOOD PRESSURE: 70 MMHG | HEIGHT: 61 IN | WEIGHT: 180 LBS | OXYGEN SATURATION: 98 % | SYSTOLIC BLOOD PRESSURE: 120 MMHG | BODY MASS INDEX: 33.99 KG/M2 | HEART RATE: 89 BPM | TEMPERATURE: 97.7 F

## 2021-08-13 PROCEDURE — 99213 OFFICE O/P EST LOW 20 MIN: CPT

## 2021-08-13 NOTE — HISTORY OF PRESENT ILLNESS
[FreeTextEntry1] : f/u lyme disease, arthralgia [de-identified] : Patient presents stating that she is completing treatment for lyme disease, but does have persisting arthralgias.  She has an appt scheduled today for physical therapy for this.  She has a long term hip injury, which she states has waxed and waned.

## 2021-08-13 NOTE — PLAN
[FreeTextEntry1] : Patient is advised to increase her fluids, and to rest as needed.  She has completed the antibiotics as prescribed, and at this time, no further testing is recommended.  She may use Advil or Tylenol as needed for any residual arthralgias.  In regards to the persisting arthralgias, she can follow up with rheumatology for further evaluation if the conservative measures we have discussed are ineffective.  SHe will also follow up with ophtho for the intermittent blurring of her vision, and I have also extended the PT she received for her neck.

## 2021-09-01 ENCOUNTER — TRANSCRIPTION ENCOUNTER (OUTPATIENT)
Age: 61
End: 2021-09-01

## 2021-09-08 ENCOUNTER — NON-APPOINTMENT (OUTPATIENT)
Age: 61
End: 2021-09-08

## 2021-09-29 ENCOUNTER — APPOINTMENT (OUTPATIENT)
Dept: OTOLARYNGOLOGY | Facility: CLINIC | Age: 61
End: 2021-09-29
Payer: MEDICAID

## 2021-09-29 VITALS
WEIGHT: 165 LBS | SYSTOLIC BLOOD PRESSURE: 115 MMHG | BODY MASS INDEX: 31.15 KG/M2 | HEIGHT: 61 IN | TEMPERATURE: 97.9 F | DIASTOLIC BLOOD PRESSURE: 80 MMHG | HEART RATE: 63 BPM

## 2021-09-29 DIAGNOSIS — G50.1 ATYPICAL FACIAL PAIN: ICD-10-CM

## 2021-09-29 DIAGNOSIS — J34.2 DEVIATED NASAL SEPTUM: ICD-10-CM

## 2021-09-29 DIAGNOSIS — J30.9 ALLERGIC RHINITIS, UNSPECIFIED: ICD-10-CM

## 2021-09-29 PROCEDURE — 99204 OFFICE O/P NEW MOD 45 MIN: CPT | Mod: 25

## 2021-09-29 PROCEDURE — 31231 NASAL ENDOSCOPY DX: CPT

## 2021-09-29 NOTE — PROCEDURE
[FreeTextEntry6] : Indication for procedure:  Unable to adequately examine mid and posterior nasal cavity with anterior rhinoscopy\par The patient has persistent rt facial pain, pnd\par \par Sinus endoscope # 99\par Nasal septum exam shows septal deviation to the rt at valve 2-3 plus\par The inferior nasal turbinates are moderately hypertrophic in size bilaterally.\par The sinus endoscope was introduced into the right nares\par exam right middle meatus reveals no mucopus or inflammation.  The right middle turbinate is WNL.\par The scope was advanced and the sphenoethmoid region was inspected.\par The superior meatus, superior turbinate and nasal vault are unremarkable.  The nasopharynx is unremarkable without inflammation or mass.\par The sinus endoscope was introduced into the left nares and\par exam left middle meatus reveals no mucopus or inflammation.  The left middle turbinate is WNL.\par The scope was advanced and the sphenoethmoid region was inspected.\par The left superior meatus and nasal vault are unremarkable.\par

## 2021-09-29 NOTE — HISTORY OF PRESENT ILLNESS
[de-identified] : covid test 6 weeks ago w pain in nose and forehead\par co persistent pain rt eyebrow ie pressure\par no nasal congestion\par rx ipratropium and ointment for nostril

## 2021-09-29 NOTE — ASSESSMENT
[FreeTextEntry1] : facial  and sinus pressure \par some septal deviation, no clinical signs injury or sinusitis\par trial fluticasone spray and advil sinus prn

## 2021-09-29 NOTE — CONSULT LETTER
[Consult Letter:] : I had the pleasure of evaluating your patient, [unfilled]. [Please see my note below.] : Please see my note below. [Consult Closing:] : Thank you very much for allowing me to participate in the care of this patient.  If you have any questions, please do not hesitate to contact me. [Sincerely,] : Sincerely, [FreeTextEntry1] : Dear Dr. LILIBETH CABELLO,\par \par Thank you for your kind referral. Please refer to my enclosed office notes for BIN BISHOP . If there are any questions free to contact me.\par  [FreeTextEntry3] : Darryl Wick MD, FACS\par

## 2021-10-04 ENCOUNTER — APPOINTMENT (OUTPATIENT)
Dept: NEUROLOGY | Facility: CLINIC | Age: 61
End: 2021-10-04
Payer: MEDICAID

## 2021-10-04 ENCOUNTER — NON-APPOINTMENT (OUTPATIENT)
Age: 61
End: 2021-10-04

## 2021-10-04 VITALS
BODY MASS INDEX: 31.91 KG/M2 | HEIGHT: 61 IN | SYSTOLIC BLOOD PRESSURE: 117 MMHG | WEIGHT: 169 LBS | TEMPERATURE: 97.8 F | DIASTOLIC BLOOD PRESSURE: 82 MMHG | HEART RATE: 79 BPM

## 2021-10-04 PROCEDURE — 99204 OFFICE O/P NEW MOD 45 MIN: CPT

## 2021-10-04 NOTE — REASON FOR VISIT
[Consultation] : a consultation visit [FreeTextEntry1] : Evaluation for Headaches, memory loss and Lyme disease

## 2021-10-04 NOTE — HISTORY OF PRESENT ILLNESS
[FreeTextEntry1] : She is 61-year-old patient coming here with the history of thalassemia minor, COVID infection last year, history of fall with traumatic injury to right side lower extremity and fractured ankle, recent diagnosis of Lyme disease treated with doxycycline under the care of  hematologist, coming here for evaluation of short-term memory loss and forgetfulness started last year not clear of her symptoms.\par Admits that she is under stress and anxiety takes Klonopin. Improving symptoms slowly.\par Except labs no other work up was done. Mild Bifrontal headcahes. No focal weakness.

## 2021-10-04 NOTE — DISCUSSION/SUMMARY
[FreeTextEntry1] : She is 61-year-old patient with  history of Lyme disease positive titers for past infection and cOVID infection last year coming here for evaluation for cognitive impairment and memory loss with underlying anxiety and depression nonfocal examination with the chronic headaches.\par Rule out CNS pathology.\par MOCA scores 26/30.\par Recommend patient to have MRI of the brain with and without contrast.\par Recommend patient to have EEG.\par Lab work done.\par Patient education provided..\par Followup evaluation after workup is completed.\par Follow up with you for other medical problems.

## 2021-10-04 NOTE — PHYSICAL EXAM
[General Appearance - Alert] : alert [General Appearance - In No Acute Distress] : in no acute distress [Oriented To Time, Place, And Person] : oriented to person, place, and time [Impaired Insight] : insight and judgment were intact [Affect] : the affect was normal [Person] : oriented to person [Place] : oriented to place [Time] : oriented to time [Short Term Intact] : short term memory impaired [Concentration Intact] : normal concentrating ability [Visual Intact] : visual attention was ~T not ~L decreased [Naming Objects] : no difficulty naming common objects [Repeating Phrases] : no difficulty repeating a phrase [Writing A Sentence] : no difficulty writing a sentence [Fluency] : fluency intact [Comprehension] : comprehension intact [Reading] : reading intact [Past History] : adequate knowledge of personal past history [Cranial Nerves Optic (II)] : visual acuity intact bilaterally,  visual fields full to confrontation, pupils equal round and reactive to light [Cranial Nerves Oculomotor (III)] : extraocular motion intact [Cranial Nerves Trigeminal (V)] : facial sensation intact symmetrically [Cranial Nerves Facial (VII)] : face symmetrical [Cranial Nerves Vestibulocochlear (VIII)] : hearing was intact bilaterally [Cranial Nerves Glossopharyngeal (IX)] : tongue and palate midline [Cranial Nerves Accessory (XI - Cranial And Spinal)] : head turning and shoulder shrug symmetric [Cranial Nerves Hypoglossal (XII)] : there was no tongue deviation with protrusion [Motor Strength] : muscle strength was normal in all four extremities [No Muscle Atrophy] : normal bulk in all four extremities [Sensation Tactile Decrease] : light touch was intact [Balance] : balance was intact [Past-pointing] : there was no past-pointing [Tremor] : no tremor present [2+] : Ankle jerk left 2+ [Plantar Reflex Right Only] : normal on the right [Plantar Reflex Left Only] : normal on the left [FreeTextEntry4] : [place] 26/30 [Sclera] : the sclera and conjunctiva were normal [PERRL With Normal Accommodation] : pupils were equal in size, round, reactive to light, with normal accommodation [Extraocular Movements] : extraocular movements were intact [Outer Ear] : the ears and nose were normal in appearance [Oropharynx] : the oropharynx was normal [Neck Appearance] : the appearance of the neck was normal [Neck Cervical Mass (___cm)] : no neck mass was observed [Jugular Venous Distention Increased] : there was no jugular-venous distention [Thyroid Diffuse Enlargement] : the thyroid was not enlarged [Thyroid Nodule] : there were no palpable thyroid nodules [Auscultation Breath Sounds / Voice Sounds] : lungs were clear to auscultation bilaterally [Heart Rate And Rhythm] : heart rate was normal and rhythm regular [Heart Sounds] : normal S1 and S2 [Heart Sounds Gallop] : no gallops [Murmurs] : no murmurs [Heart Sounds Pericardial Friction Rub] : no pericardial rub [Full Pulse] : the pedal pulses are present [Edema] : there was no peripheral edema [Bowel Sounds] : normal bowel sounds [Abdomen Soft] : soft [Abdomen Tenderness] : non-tender [Abdomen Mass (___ Cm)] : no abdominal mass palpated [No CVA Tenderness] : no ~M costovertebral angle tenderness [No Spinal Tenderness] : no spinal tenderness [Abnormal Walk] : normal gait [Nail Clubbing] : no clubbing  or cyanosis of the fingernails [Musculoskeletal - Swelling] : no joint swelling seen [Motor Tone] : muscle strength and tone were normal [Skin Color & Pigmentation] : normal skin color and pigmentation [Skin Turgor] : normal skin turgor [] : no rash

## 2021-10-04 NOTE — REVIEW OF SYSTEMS
[Memory Lapses or Loss] : memory loss [Tension Headache] : tension-type headaches [Anxiety] : anxiety [Depression] : depression [Eyesight Problems] : eyesight problems [Negative] : Heme/Lymph

## 2021-10-13 ENCOUNTER — APPOINTMENT (OUTPATIENT)
Dept: NEUROLOGY | Facility: CLINIC | Age: 61
End: 2021-10-13

## 2021-10-14 ENCOUNTER — APPOINTMENT (OUTPATIENT)
Dept: FAMILY MEDICINE | Facility: CLINIC | Age: 61
End: 2021-10-14
Payer: MEDICAID

## 2021-10-14 VITALS
WEIGHT: 180 LBS | HEART RATE: 84 BPM | SYSTOLIC BLOOD PRESSURE: 116 MMHG | BODY MASS INDEX: 33.99 KG/M2 | HEIGHT: 61 IN | OXYGEN SATURATION: 97 % | TEMPERATURE: 96.9 F | DIASTOLIC BLOOD PRESSURE: 80 MMHG

## 2021-10-14 DIAGNOSIS — M54.16 RADICULOPATHY, LUMBAR REGION: ICD-10-CM

## 2021-10-14 PROCEDURE — 99214 OFFICE O/P EST MOD 30 MIN: CPT

## 2021-10-14 RX ORDER — GLUCOSA SU 2KCL/CHONDROITIN SU 500-400 MG
100 CAPSULE ORAL
Refills: 0 | Status: ACTIVE | COMMUNITY
Start: 2021-10-14

## 2021-10-14 RX ORDER — FLUTICASONE PROPIONATE 50 UG/1
50 SPRAY, METERED NASAL DAILY
Qty: 1 | Refills: 5 | Status: COMPLETED | COMMUNITY
Start: 2021-09-29 | End: 2021-10-14

## 2021-10-14 RX ORDER — FLUCONAZOLE 150 MG/1
150 TABLET ORAL
Qty: 1 | Refills: 0 | Status: COMPLETED | COMMUNITY
Start: 2021-07-30 | End: 2021-10-14

## 2021-10-14 RX ORDER — FAMOTIDINE 20 MG/1
20 TABLET, FILM COATED ORAL
Qty: 90 | Refills: 1 | Status: COMPLETED | COMMUNITY
Start: 2021-01-26 | End: 2021-10-14

## 2021-10-14 RX ORDER — LEVOCETIRIZINE DIHYDROCHLORIDE 5 MG/1
5 TABLET ORAL DAILY
Qty: 90 | Refills: 5 | Status: COMPLETED | COMMUNITY
Start: 2021-01-26 | End: 2021-10-14

## 2021-10-14 RX ORDER — PANTOPRAZOLE 20 MG/1
20 TABLET, DELAYED RELEASE ORAL DAILY
Qty: 30 | Refills: 5 | Status: COMPLETED | COMMUNITY
Start: 2019-04-02 | End: 2021-10-14

## 2021-10-14 RX ORDER — TRAMADOL HYDROCHLORIDE 25 MG/1
TABLET, COATED ORAL
Refills: 0 | Status: COMPLETED | COMMUNITY
End: 2021-10-14

## 2021-10-14 RX ORDER — PANTOPRAZOLE 40 MG/1
40 TABLET, DELAYED RELEASE ORAL
Qty: 30 | Refills: 0 | Status: COMPLETED | COMMUNITY
Start: 2020-01-06 | End: 2021-10-14

## 2021-10-14 NOTE — HISTORY OF PRESENT ILLNESS
[FreeTextEntry1] : f/u chronic concerns [de-identified] : This is a 62yo pmhx hld/ cervical radiculopathy/ cervical stenosis of spine/ Lumbar radiculopathy/ Lumbar stenosis/ Lyme/ fall Jan. 2020, a/w multiple traumas, chronic multiple joint/ muscle pain.\par patient following up with rheumatology, neurology scheduled apts. for Dec. 2021.\par 12/15/2021 has apt. with GYN for check of hormones and discussion of replacement therapy.\par today she is coming in requesting renewal referral to continue PT for chronic neck and lower back pain.\par she is also seeking new psychiatrist to continue mgmt of chronic daily use of Clonazepam far mgmt anxiety, she is not interested in trial of nonbenzodiazepine medication for mgmt as she reports Clonazepam is the only medication that manages her panic and ptsd- [facility] [facility] she is already compliant with counseling. \par she is also concerned with the results of her previous ECG and requests to discuss. \par o/w in no acute distress, no other major concerns and reports feeling well. \par will evaluate and manage as appropriate.

## 2021-10-14 NOTE — ASSESSMENT
[FreeTextEntry1] : preventative health\par annual wellness- 07/28/2021\par flu/ shingles- refuses\par colonoscopy- 10/2020, polypectomy benign, rec'd recheck in 3 yrs\par ophthalmology dilated eye exam- Dr. Tatum, last seen 2 weeks ago, referred to Dr. Ordonez apt. tomorrow for continued c/o double vision. \par GYN for Pap- 2021, apt. with GYN for Dec. 2021\par mammo- normal 07/2021, completes with GYN\par DEXA- denies hx bone density loss, completes with  GYN\par \par hld\par recheck a1c, lipid, cmp- RTO fasting\par in no acute distress and o/w offers no complaints \par \par cervical radiculopathy/ cervical stenosis of spine/ Lumbar radiculopathy/ Lumbar stenosis\par fall Jan. 2020, a/w multiple traumas, chronic multiple joint/ muscle pain.\par referral for continued PT provided\par patient also manages conservatively\par in no acute distress and o/w offers no complaints \par \par hx abnormal ECG/ mild intermittent asthma\par schedule f/u with cardiology Dr. Camp for stress/echo\par schedule apt with allergist for PFTs\par continue Albuterol prn\par conservative mgmt\par in no acute distress and o/w offers no complaints \par notify office if worsening/persistent symptoms or new onset complaints/concerns, in the event of any emergency or life threatening condition, go to the nearest emergency department and then notify office. \par \par Lyme/ hx multiple joint&muscle pain\par under mgmt neurology and rheumatology\par in no acute distress and o/w offers no complaints \par \par blurred vision\par f/u with specialist tomorrow as scheduled\par agrees to provide PCP with reports.\par in no acute distress and o/w offers no complaints \par \par anxiety/ PSTD/ panic\par reports as well managed on Clonazepam \par taking this daily, counseled on use of medication\par provided contacts for [facility], patient already attending counseling\par refuses long acting nonbenzodiazepine medication \par in no acute distress and o/w offers no complaints \par partial renewal provided as requested- continued mgmt with psychiatry

## 2021-10-14 NOTE — HEALTH RISK ASSESSMENT
[No] : In the past 12 months have you used drugs other than those required for medical reasons? No [No falls in past year] : Patient reported no falls in the past year [0] : 2) Feeling down, depressed, or hopeless: Not at all (0) [PHQ-2 Negative - No further assessment needed] : PHQ-2 Negative - No further assessment needed [Patient/Caregiver not ready to engage] : , patient/caregiver not ready to engage [] : No [de-identified] : active [de-identified] : well balanced [[id number]] : 0 [AdvancecareDate] : 10/21

## 2021-10-14 NOTE — COUNSELING
[Fall prevention counseling provided] : Fall prevention counseling provided [Behavioral health counseling provided] : Behavioral health counseling provided [Sleep ___ hours/day] : Sleep [unfilled] hours/day [Engage in a relaxing activity] : Engage in a relaxing activity [Plan in advance] : Plan in advance [None] : None [Good understanding] : Patient has a good understanding of lifestyle changes and steps needed to achieve self management goal [de-identified] : Maintain balanced diet of whole grain, fruits and vegetables, lean meats, skinless poultry, fish, beans, soy products, eggs, nuts, and low fat dairy as per FDA dietary guidelines. \par Avoid high calorie/low nutrient foods. \par vegetables/fruits- at least 5 servings/day, \par whole grains- 100% whole grain and at least 3 grams fiber/day.\par reduce/eliminate saturated fat- less than 5% on nutrition labels (ex. buchanan, deli meat, hot dogs, fried foods, cookies, ice cream, chips, soft drinks, etc.)\par stay within your FDA recommended daily calorie needs or use the plate method to portion intake. \par Avoid fast food and limit eating out. When eating out choose healthy alternatives (ex. grilled, baked, steamed. low fat dressings. avoid french fries).\par DO NOT CONSUME FOODS YOU ARE ALLERGIC TO DESPITE DIETARY RECOMMENDATIONS.\par \par For more information you can visit www.Health.gov \par \par Incorporate regular physical activity most days of the week. \par Regular aerobic activity- moderate intensity, most days/wk, at least 30min/day- ex. brisk walk 2.5miles/hr, ballroom dancing, water aerobics, light yard work (raking/lawn mowing) actively playing basketball, biking 10miles/hr.\par Muscle strengthening and strength/resistance exercises 2-3days/wk- ex. free weights, resistance bands, your own weight (squats/pull-ups/push-ups).\par Neuro-motor exercises for balance/agility/coordination and flexibility exercises 2-3days/wk, 20-30min/day- ex. yoga and ludin-chi.\par Bone strengthening at least 30min/day, most days of the week- ex. weights, resistance bands, running, brisk walking, jumping rope, stair climbing, tennis.\par Decrease sedentary time. \par LISTEN TO YOUR BODY AND MODIFY ACTIVITY AS NEEDED- DO NOT OVEREXERT YOURSELF DURING PHYSICAL ACTIVITY DESPITE RECOMMENDATION.\par \par For more information you can visit www.Health.gov \par \par back pain education-\par it is important to remain active, listen to the body and do activity as tolerated. application of ice or heat and warm showers/baths with stretching can help with pain relief. maintain healthy posture at work and be mindful to not sit too long- take regular breaks to walk periodically and stretch regularly. use proper form when lifting heavy objects,  take medications for spasms and pain relief as prescribed. there are non-medicinal approaches to help pain relief as well such as massage or acupuncture, however, there is limited evidence to support the use of these alternative methods and you should discuss appropriateness with a medical professional before use. please seek emergency medical care and then notify our office in the event of new onset worsening pain, unrelieved with previous methods of pain relief, new onset fever, weakness in one or both legs, loss of bowel or bladder function or symptoms of sexual dysfunction. \par \par Depression/Anxiety are mood disorders. The symptoms of depression/anxiety can affect how you think and feel and how you handle every day activities (work, eating, sleeping). Multiple treatments are available such as psychotherapy/counseling, medications called antidepressants or anxiolytics, or other therapies. \par Some medications can take 2-4 weeks to work and can have side effects; notify our office if you experience any side effects. Suicidal thoughts or attempt may occur in some people, especially if agitated when first initiating medication, before it begins to work; if suicidal thoughts/ideations or suicidal attempt- call 911 for emergency services/go to the nearest emergency department. \par IF IN CRISIS YOU HAVE SUPPORT= CALL 911/EMERGENCY SERVICES, CALL [facility] [phone], CALL OUR OFFICE.

## 2021-10-14 NOTE — PHYSICAL EXAM
[No Acute Distress] : no acute distress [Well Nourished] : well nourished [Well Developed] : well developed [No Respiratory Distress] : no respiratory distress  [No Accessory Muscle Use] : no accessory muscle use [Clear to Auscultation] : lungs were clear to auscultation bilaterally [No Carotid Bruits] : no carotid bruits [No Edema] : there was no peripheral edema [Soft] : abdomen soft [Non Tender] : non-tender [Non-distended] : non-distended [No CVA Tenderness] : no CVA  tenderness [No Rash] : no rash [Coordination Grossly Intact] : coordination grossly intact [Normal Gait] : normal gait [Normal Mental Status] : the patient's orientation, memory, attention, language and fund of knowledge were normal [Anxious] : anxious [Normal Rhythm] : a normal rhythm [Normal Tone] : normal tone [Normal Volume] : normal volume [Rate Pressured] : pressured [Normal] : normal throught processes [Normal Associations] :  no deficiency [Impaired judgment] : intact judgment [Impaired Insight] : intact insight [Delusions] : exhibited no delusions [Hallucinations] : exhibited no hallucinations [Obsessions] : denied obsessions [Preoccupation with Violence] : denied preoccupation with violent thoughts [Suicidal Ideation] : denied suicidal ideation [Suicidal Intent] : denied suicidal intent [Suicidal Plan] : denied suicidal plans [Homicidal Ideation] : denied homicidal ideation [Homicidal Intent] : denied homicidal intention [Homicidal Plan] : denied homicidal plans

## 2021-10-14 NOTE — REVIEW OF SYSTEMS
[Joint Pain] : joint pain [Back Pain] : back pain [Negative] : Heme/Lymph [Skin Rash] : no skin rash [de-identified] : well managed for now

## 2021-10-22 ENCOUNTER — APPOINTMENT (OUTPATIENT)
Dept: NEUROLOGY | Facility: CLINIC | Age: 61
End: 2021-10-22
Payer: MEDICAID

## 2021-10-22 DIAGNOSIS — R41.3 OTHER AMNESIA: ICD-10-CM

## 2021-10-22 PROCEDURE — 95816 EEG AWAKE AND DROWSY: CPT

## 2021-10-29 ENCOUNTER — APPOINTMENT (OUTPATIENT)
Dept: MRI IMAGING | Facility: CLINIC | Age: 61
End: 2021-10-29

## 2021-11-01 PROBLEM — R41.3 MEMORY DIFFICULTIES: Status: ACTIVE | Noted: 2021-10-04

## 2021-11-14 NOTE — PHYSICAL EXAM
[Grossly Normal Strength/Tone] : grossly normal strength/tone [No Focal Deficits] : no focal deficits [Normal] : affect was normal and insight and judgment were intact

## 2021-11-15 ENCOUNTER — APPOINTMENT (OUTPATIENT)
Dept: FAMILY MEDICINE | Facility: CLINIC | Age: 61
End: 2021-11-15
Payer: MEDICAID

## 2021-11-15 VITALS
SYSTOLIC BLOOD PRESSURE: 122 MMHG | HEIGHT: 61 IN | DIASTOLIC BLOOD PRESSURE: 82 MMHG | BODY MASS INDEX: 33.99 KG/M2 | HEART RATE: 69 BPM | OXYGEN SATURATION: 98 % | TEMPERATURE: 96.1 F | WEIGHT: 180 LBS

## 2021-11-15 PROCEDURE — 99214 OFFICE O/P EST MOD 30 MIN: CPT

## 2021-11-15 RX ORDER — CLONAZEPAM 0.5 MG/1
0.5 TABLET ORAL
Qty: 15 | Refills: 0 | Status: DISCONTINUED | COMMUNITY
Start: 2021-07-28 | End: 2021-11-15

## 2021-11-15 NOTE — REVIEW OF SYSTEMS
[Vision Problems] : vision problems [Memory Loss] : memory loss [Negative] : Heme/Lymph [Anxiety] : no anxiety

## 2021-11-15 NOTE — ASSESSMENT
[FreeTextEntry1] : She is not fasting today but she had her cholesterol checked 3 months ago. \par \par She requests a refill on Folic Acid. She states that her hematologist told her to take this for her thalassemia minor and it is less expensive with a prescription.\par \par She states that she stopped taking Clonazepam. She is now using CBD instead. She has mainly been using topical CBD for her hip pain but she did try a CBD tincture.\par \par She was previously taking Clonazepam regularly. Her Clonazepam was refilled on 10/14/21 for 15 pills (though this does not show up in [facility]). It was also refilled on 10/4/21 for 15 pills. Prior to that it was refilled on 7/28/21 for 30 pills by [facility] (this also does not show up in [facility]). The prescriptions that do show up in [facility] were prescribed by Dr. Jens Hammer for 60 pills on 7/19/21, 60 pills on 4/1/21, 30 pills on 3/3/21, and 30 pills on 1/21/21. This represents 180 pills filled between 1/21 and 7/19 but according to ISTOP only 15 pills since then (on 10/4).\par \par At this point she is going to try to go without Clonazepam at all, though if she does have episodes of anxiety she would like to keep the option open to take this PRN.

## 2021-11-15 NOTE — PLAN
[FreeTextEntry1] : She is due for 6 month follow up blood work in January. She will schedule an appointment for this.

## 2021-11-15 NOTE — HISTORY OF PRESENT ILLNESS
[FreeTextEntry1] : BIN BISHOP is a 61 year old female here for a follow up visit.  [de-identified] : She has a history of hypercholesterolemia treated with Crestor. Her labs were last checked in July. She also has a history of Lyme disease, chronic pain after a fall, anxiety, and was evaluated for short term memory loss by Dr. Mei. She had an EEG which was negative. She was referred for a brain MRI but has not had this done.\par \par She has been having fatigue since March when her mother . She has a history of thalassemia minor and saw her hematologist who ordered blood work. He told her that she had positive antibodies for Lyme. She has a history of tick bites but has never tested positive for Lyme disease. He treated her with doxycycline for 21 days over July and August. He also recommended she have her thyroid (T3) rechecked and this was normal.\par \par Last year she had a fall at work and fractured her right 5th metatarsal, injured her left knee, and had bilateral hip labral tears. She has arthritis as well. \par \par She was in the ER (at [facility]) last week for a GI bleed. She states that her dentist put her on amoxicillin for bleeding gums. She had a CT scan in the ER which was negative. She saw GI last Friday and he did not order any other testing since she had a negative colonoscopy last year.

## 2021-11-30 ENCOUNTER — APPOINTMENT (OUTPATIENT)
Dept: ENDOCRINOLOGY | Facility: CLINIC | Age: 61
End: 2021-11-30
Payer: MEDICAID

## 2021-11-30 VITALS
HEIGHT: 61 IN | DIASTOLIC BLOOD PRESSURE: 82 MMHG | OXYGEN SATURATION: 98 % | SYSTOLIC BLOOD PRESSURE: 122 MMHG | TEMPERATURE: 97.3 F | HEART RATE: 86 BPM | RESPIRATION RATE: 16 BRPM | BODY MASS INDEX: 33.61 KG/M2 | WEIGHT: 178 LBS

## 2021-11-30 PROCEDURE — 99203 OFFICE O/P NEW LOW 30 MIN: CPT

## 2021-11-30 NOTE — ASSESSMENT
[FreeTextEntry1] : Discussed menopause and FH of Hypothyroidism\par Will repeat TFT\par Pt will f/u with GYN for possible Estrogen replacement\par Diet, wt loss and ex discussed

## 2021-11-30 NOTE — REVIEW OF SYSTEMS
[Fatigue] : fatigue [Constipation] : constipation [Diarrhea] : diarrhea [Joint Pain] : joint pain [Joint Stiffness] : joint stiffness [Hirsutism] : hirsutism [Insomnia] : insomnia [Anxiety] : anxiety [Negative] : Heme/Lymph [Decreased Appetite] : appetite not decreased [Recent Weight Gain (___ Lbs)] : no recent weight gain [Recent Weight Loss (___ Lbs)] : no recent weight loss [Palpitations] : no palpitations [Acne] : no acne [Tremors] : no tremors [Poor Balance] : steady state balance [Hot Flashes] : no hot flashes [FreeTextEntry7] : IBS [FreeTextEntry8] : Menopause [FreeTextEntry9] : hips

## 2021-11-30 NOTE — HISTORY OF PRESENT ILLNESS
[FreeTextEntry1] : 61 yoF with Fatigue and poor memory.\par C/O hair growth on face/chin\par In Menopause since age 50\par ++ FH of hypothyroidism\par PMH of Osteopenia- No hx of hip fx- GYN to do DEXA

## 2021-11-30 NOTE — PHYSICAL EXAM
[Alert] : alert [No Acute Distress] : no acute distress [PERRL] : pupils equal, round and reactive to light [No Proptosis] : no proptosis [No Neck Mass] : no neck mass was observed [Thyroid Not Enlarged] : the thyroid was not enlarged [Clear to Auscultation] : lungs were clear to auscultation bilaterally [Normal Rate] : heart rate was normal [Regular Rhythm] : with a regular rhythm [No Joint Swelling] : no joint swelling seen [No Rash] : no rash [Normal Reflexes] : deep tendon reflexes were 2+ and symmetric [No Tremors] : no tremors [Oriented x3] : oriented to person, place, and time [Normal Insight/Judgement] : insight and judgment were intact

## 2021-12-01 LAB
T4 FREE SERPL-MCNC: 1.2 NG/DL
TSH SERPL-ACNC: 1.22 UIU/ML

## 2021-12-07 ENCOUNTER — FORM ENCOUNTER (OUTPATIENT)
Age: 61
End: 2021-12-07

## 2021-12-08 ENCOUNTER — NON-APPOINTMENT (OUTPATIENT)
Age: 61
End: 2021-12-08

## 2021-12-08 ENCOUNTER — APPOINTMENT (OUTPATIENT)
Dept: RHEUMATOLOGY | Facility: CLINIC | Age: 61
End: 2021-12-08
Payer: MEDICAID

## 2021-12-08 VITALS
HEART RATE: 76 BPM | HEIGHT: 61 IN | WEIGHT: 176 LBS | OXYGEN SATURATION: 98 % | SYSTOLIC BLOOD PRESSURE: 124 MMHG | DIASTOLIC BLOOD PRESSURE: 72 MMHG | TEMPERATURE: 97.6 F | BODY MASS INDEX: 33.23 KG/M2

## 2021-12-08 DIAGNOSIS — K58.1 IRRITABLE BOWEL SYNDROME WITH CONSTIPATION: ICD-10-CM

## 2021-12-08 DIAGNOSIS — E66.9 OBESITY, UNSPECIFIED: ICD-10-CM

## 2021-12-08 DIAGNOSIS — L40.50 ARTHROPATHIC PSORIASIS, UNSPECIFIED: ICD-10-CM

## 2021-12-08 DIAGNOSIS — Z87.442 PERSONAL HISTORY OF URINARY CALCULI: ICD-10-CM

## 2021-12-08 DIAGNOSIS — G44.229 CHRONIC TENSION-TYPE HEADACHE, NOT INTRACTABLE: ICD-10-CM

## 2021-12-08 PROCEDURE — 99204 OFFICE O/P NEW MOD 45 MIN: CPT

## 2021-12-10 ENCOUNTER — APPOINTMENT (OUTPATIENT)
Dept: MRI IMAGING | Facility: CLINIC | Age: 61
End: 2021-12-10
Payer: MEDICAID

## 2021-12-10 ENCOUNTER — OUTPATIENT (OUTPATIENT)
Dept: OUTPATIENT SERVICES | Facility: HOSPITAL | Age: 61
LOS: 1 days | End: 2021-12-10

## 2021-12-10 ENCOUNTER — RESULT REVIEW (OUTPATIENT)
Age: 61
End: 2021-12-10

## 2021-12-10 DIAGNOSIS — Z98.890 OTHER SPECIFIED POSTPROCEDURAL STATES: Chronic | ICD-10-CM

## 2021-12-10 DIAGNOSIS — A69.20 LYME DISEASE, UNSPECIFIED: ICD-10-CM

## 2021-12-10 PROCEDURE — 72202 X-RAY EXAM SI JOINTS 3/> VWS: CPT | Mod: 26

## 2021-12-10 PROCEDURE — 70553 MRI BRAIN STEM W/O & W/DYE: CPT | Mod: 26

## 2021-12-12 LAB
ALBUMIN SERPL ELPH-MCNC: 4.8 G/DL
ALP BLD-CCNC: 78 U/L
ALT SERPL-CCNC: 23 U/L
ANION GAP SERPL CALC-SCNC: 12 MMOL/L
AST SERPL-CCNC: 22 U/L
BASOPHILS # BLD AUTO: 0.05 K/UL
BASOPHILS NFR BLD AUTO: 0.8 %
BILIRUB SERPL-MCNC: 0.3 MG/DL
BUN SERPL-MCNC: 12 MG/DL
CALCIUM SERPL-MCNC: 9.8 MG/DL
CHLORIDE SERPL-SCNC: 103 MMOL/L
CO2 SERPL-SCNC: 26 MMOL/L
CREAT SERPL-MCNC: 0.87 MG/DL
CRP SERPL-MCNC: 4 MG/L
DSDNA AB SER-ACNC: <12 IU/ML
ENA RNP AB SER IA-ACNC: 0.4 AL
ENA SM AB SER IA-ACNC: <0.2 AL
ENA SS-A AB SER IA-ACNC: <0.2 AL
ENA SS-B AB SER IA-ACNC: <0.2 AL
EOSINOPHIL # BLD AUTO: 0.14 K/UL
EOSINOPHIL NFR BLD AUTO: 2.4 %
ERYTHROCYTE [SEDIMENTATION RATE] IN BLOOD BY WESTERGREN METHOD: 20 MM/HR
GLUCOSE SERPL-MCNC: 71 MG/DL
HAV IGM SER QL: NONREACTIVE
HBV CORE IGM SER QL: NONREACTIVE
HBV SURFACE AG SER QL: NONREACTIVE
HCT VFR BLD CALC: 40.4 %
HCV AB SER QL: NONREACTIVE
HCV S/CO RATIO: 0.07 S/CO
HGB BLD-MCNC: 12 G/DL
IMM GRANULOCYTES NFR BLD AUTO: 1.5 %
LYMPHOCYTES # BLD AUTO: 1.98 K/UL
LYMPHOCYTES NFR BLD AUTO: 33.3 %
MAN DIFF?: NORMAL
MCHC RBC-ENTMCNC: 20 PG
MCHC RBC-ENTMCNC: 29.7 GM/DL
MCV RBC AUTO: 67.3 FL
MONOCYTES # BLD AUTO: 0.44 K/UL
MONOCYTES NFR BLD AUTO: 7.4 %
NEUTROPHILS # BLD AUTO: 3.25 K/UL
NEUTROPHILS NFR BLD AUTO: 54.6 %
PLATELET # BLD AUTO: 241 K/UL
POTASSIUM SERPL-SCNC: 4.4 MMOL/L
PROT SERPL-MCNC: 6.8 G/DL
RBC # BLD: 6 M/UL
RBC # FLD: 16.8 %
SODIUM SERPL-SCNC: 141 MMOL/L
THYROGLOB AB SERPL-ACNC: <20 IU/ML
THYROPEROXIDASE AB SERPL IA-ACNC: <10 IU/ML
TSH SERPL-ACNC: 1.62 UIU/ML
TTG IGG SER IA-ACNC: <1.2 U/ML
TTG IGG SER IA-ACNC: NEGATIVE
WBC # FLD AUTO: 5.95 K/UL

## 2021-12-13 ENCOUNTER — APPOINTMENT (OUTPATIENT)
Dept: RADIOLOGY | Facility: CLINIC | Age: 61
End: 2021-12-13

## 2021-12-15 ENCOUNTER — NON-APPOINTMENT (OUTPATIENT)
Age: 61
End: 2021-12-15

## 2021-12-15 ENCOUNTER — APPOINTMENT (OUTPATIENT)
Dept: OBGYN | Facility: CLINIC | Age: 61
End: 2021-12-15
Payer: MEDICAID

## 2021-12-15 VITALS
HEART RATE: 80 BPM | WEIGHT: 179 LBS | DIASTOLIC BLOOD PRESSURE: 87 MMHG | HEIGHT: 61 IN | BODY MASS INDEX: 33.79 KG/M2 | SYSTOLIC BLOOD PRESSURE: 127 MMHG

## 2021-12-15 DIAGNOSIS — Z01.419 ENCOUNTER FOR GYNECOLOGICAL EXAMINATION (GENERAL) (ROUTINE) W/OUT ABNORMAL FINDINGS: ICD-10-CM

## 2021-12-15 DIAGNOSIS — N39.0 URINARY TRACT INFECTION, SITE NOT SPECIFIED: ICD-10-CM

## 2021-12-15 DIAGNOSIS — N76.0 ACUTE VAGINITIS: ICD-10-CM

## 2021-12-15 DIAGNOSIS — L68.0 HIRSUTISM: ICD-10-CM

## 2021-12-15 LAB
ANA SER IF-ACNC: NEGATIVE
B BURGDOR AB SER-IMP: NEGATIVE
B BURGDOR IGM PATRN SER IB-IMP: NEGATIVE
B BURGDOR18KD IGG SER QL IB: NORMAL
B BURGDOR23KD IGG SER QL IB: NORMAL
B BURGDOR23KD IGM SER QL IB: NORMAL
B BURGDOR28KD IGG SER QL IB: NORMAL
B BURGDOR30KD IGG SER QL IB: NORMAL
B BURGDOR31KD IGG SER QL IB: NORMAL
B BURGDOR39KD IGG SER QL IB: PRESENT
B BURGDOR39KD IGM SER QL IB: NORMAL
B BURGDOR41KD IGG SER QL IB: PRESENT
B BURGDOR41KD IGM SER QL IB: NORMAL
B BURGDOR45KD IGG SER QL IB: NORMAL
B BURGDOR58KD IGG SER QL IB: NORMAL
B BURGDOR66KD IGG SER QL IB: NORMAL
B BURGDOR93KD IGG SER QL IB: NORMAL
C3 SERPL-MCNC: 143 MG/DL
C4 SERPL-MCNC: 27 MG/DL
CCP AB SER IA-ACNC: <8 UNITS
RF+CCP IGG SER-IMP: NEGATIVE

## 2021-12-15 PROCEDURE — 99396 PREV VISIT EST AGE 40-64: CPT

## 2021-12-15 NOTE — HISTORY OF PRESENT ILLNESS
[Good] : being in good health [Last Mammogram ___] : Last Mammogram was [unfilled] [Last Bone Density ___] : Last bone density studies [unfilled] [Last Colonoscopy ___] : Last colonoscopy [unfilled] [Last Pap ___] : Last cervical pap smear was [unfilled] [Postmenopausal] : is postmenopausal [Sexually Active] : is sexually active [HPV Vaccine NA Due to Age] : HPV vaccine not available to patient due to age [Mammogramdate] : 2021 [BreastSonogramDate] : 2021 [PapSmeardate] : 2019 [BoneDensityDate] : 2020 [ColonoscopyDate] : 2020

## 2021-12-15 NOTE — PHYSICAL EXAM
[Awake] : awake [Alert] : alert [Breast Implant Bilateral] : implants [Soft] : soft [Oriented x3] : oriented to person, place, and time [Normal] : uterus [Atrophy] : atrophy [No Bleeding] : there was no active vaginal bleeding [Uterine Adnexae] : were not tender and not enlarged [Acute Distress] : no acute distress [Mass] : no breast mass [Nipple Discharge] : no nipple discharge [Axillary LAD] : no axillary lymphadenopathy [Tender] : non tender

## 2021-12-15 NOTE — REVIEW OF SYSTEMS
[Abn Vag Bleeding] : abnormal vaginal bleeding [Nl] : Integumentary [Feeling Tired] : feeling tired [Arthralgias] : arthralgias [Joint Pain] : joint pain [Joint Stiffness] : joint stiffness

## 2021-12-15 NOTE — CHIEF COMPLAINT
[Annual Visit] : annual visit [FreeTextEntry1] : son to be ,daughter is x 2 yrs\par mom 90 yo--[place]\par vag dryness,dysp\par rev TVS\par Rec Revaree

## 2021-12-16 PROBLEM — Z00.00 ENCOUNTER FOR PREVENTIVE HEALTH EXAMINATION: Noted: 2021-12-16

## 2021-12-16 LAB — DHEA-S SERPL-MCNC: 46.8 UG/DL

## 2021-12-17 ENCOUNTER — APPOINTMENT (OUTPATIENT)
Dept: PODIATRY | Facility: CLINIC | Age: 61
End: 2021-12-17
Payer: MEDICAID

## 2021-12-17 ENCOUNTER — TRANSCRIPTION ENCOUNTER (OUTPATIENT)
Age: 61
End: 2021-12-17

## 2021-12-17 ENCOUNTER — APPOINTMENT (OUTPATIENT)
Dept: NEUROLOGY | Facility: CLINIC | Age: 61
End: 2021-12-17
Payer: MEDICAID

## 2021-12-17 VITALS
OXYGEN SATURATION: 95 % | SYSTOLIC BLOOD PRESSURE: 125 MMHG | TEMPERATURE: 97.2 F | HEART RATE: 60 BPM | DIASTOLIC BLOOD PRESSURE: 83 MMHG

## 2021-12-17 VITALS
HEIGHT: 61 IN | WEIGHT: 175 LBS | SYSTOLIC BLOOD PRESSURE: 111 MMHG | HEART RATE: 67 BPM | BODY MASS INDEX: 33.04 KG/M2 | DIASTOLIC BLOOD PRESSURE: 74 MMHG | TEMPERATURE: 97.8 F

## 2021-12-17 DIAGNOSIS — Z87.19 PERSONAL HISTORY OF OTHER DISEASES OF THE DIGESTIVE SYSTEM: ICD-10-CM

## 2021-12-17 DIAGNOSIS — G31.84 MILD COGNITIVE IMPAIRMENT, SO STATED: ICD-10-CM

## 2021-12-17 DIAGNOSIS — B35.3 TINEA PEDIS: ICD-10-CM

## 2021-12-17 DIAGNOSIS — Z87.39 PERSONAL HISTORY OF OTHER DISEASES OF THE MUSCULOSKELETAL SYSTEM AND CONNECTIVE TISSUE: ICD-10-CM

## 2021-12-17 DIAGNOSIS — M48.07 SPINAL STENOSIS, LUMBOSACRAL REGION: ICD-10-CM

## 2021-12-17 DIAGNOSIS — B35.1 TINEA UNGUIUM: ICD-10-CM

## 2021-12-17 DIAGNOSIS — A69.20 LYME DISEASE, UNSPECIFIED: ICD-10-CM

## 2021-12-17 LAB
BACTERIA UR CULT: NORMAL
CANDIDA VAG CYTO: NOT DETECTED
G VAGINALIS+PREV SP MTYP VAG QL MICRO: NOT DETECTED
HPV HIGH+LOW RISK DNA PNL CVX: NOT DETECTED
T VAGINALIS VAG QL WET PREP: NOT DETECTED
TESTOST BND SERPL-MCNC: 0.9 PG/ML
TESTOSTERONE TOTAL S: 3 NG/DL

## 2021-12-17 PROCEDURE — 99213 OFFICE O/P EST LOW 20 MIN: CPT

## 2021-12-17 PROCEDURE — 99203 OFFICE O/P NEW LOW 30 MIN: CPT | Mod: 25

## 2021-12-17 PROCEDURE — 11720 DEBRIDE NAIL 1-5: CPT

## 2021-12-17 RX ORDER — CLOTRIMAZOLE AND BETAMETHASONE DIPROPIONATE 10; .5 MG/G; MG/G
1-0.05 CREAM TOPICAL TWICE DAILY
Qty: 2 | Refills: 3 | Status: COMPLETED | COMMUNITY
Start: 2021-12-17 | End: 2022-02-11

## 2021-12-17 RX ORDER — CHROMIUM 200 MCG
800 TABLET ORAL DAILY
Qty: 90 | Refills: 3 | Status: DISCONTINUED | COMMUNITY
Start: 2021-11-15 | End: 2021-12-17

## 2021-12-17 NOTE — PHYSICAL EXAM
[General Appearance - Alert] : alert [2+] : left foot dorsalis pedis 2+ [Sensation] : the sensory exam was normal to light touch and pinprick [Ankle Swelling (On Exam)] : not present [Varicose Veins Of Lower Extremities] : not present [] : not present [de-identified] : 5/5 strength in all quadrants bilaterally, no tenderness to palpation and ROM of AJ & STJ [Diminished Throughout Right Foot] : normal sensation with monofilament testing throughout right foot [Diminished Throughout Left Foot] : normal sensation with monofilament testing throughout left foot [FreeTextEntry1] : left hallux nail thickened, discolored, nontender to palpation. left plantar and interspace skin is dry, scaly, and flaking. no open lesions, no lacerations, no erythema, no ecchymosis, no fluctuance, no proximal streaking, no malodor

## 2021-12-17 NOTE — REASON FOR VISIT
[Follow-Up: _____] : a [unfilled] follow-up visit [FreeTextEntry1] : Follow up for Memory loss/ Lyme disease

## 2021-12-17 NOTE — HISTORY OF PRESENT ILLNESS
[FreeTextEntry1] : She is 61-year-old patient coming here for followup evaluation after workup for Lyme disease and short-term memory loss and forgetfulness after MRI scan and EEG completed.\par Denies of any new complaints and were all feeling better since last visit headaches resolved and no focal weakness and memory improved significantly since last visit sleeping better and no other new complaint since last visit.

## 2021-12-17 NOTE — HISTORY OF PRESENT ILLNESS
[FreeTextEntry1] : Pt seen for new complaint of left great toenail discoloration and left foot dry, scaly, itchy skin. pt relates that this has happened every year starting in the summer after she goes to the beach and notes that it resolves in the beginning of winter. Pt relates that this happens to her left foot and has been recurring for the past few years. Pt relates that she has tried moisturizers which do not completely alleviate her symptoms. Pt relates that her symptoms have not yet subsided and inquires regarding treatment options. Denies any other complaints.

## 2021-12-17 NOTE — PHYSICAL EXAM
[General Appearance - Alert] : alert [General Appearance - In No Acute Distress] : in no acute distress [Oriented To Time, Place, And Person] : oriented to person, place, and time [Impaired Insight] : insight and judgment were intact [Affect] : the affect was normal [Person] : oriented to person [Place] : oriented to place [Time] : oriented to time [Short Term Intact] : short term memory impaired [Concentration Intact] : normal concentrating ability [Visual Intact] : visual attention was ~T not ~L decreased [Naming Objects] : no difficulty naming common objects [Repeating Phrases] : no difficulty repeating a phrase [Writing A Sentence] : no difficulty writing a sentence [Fluency] : fluency intact [Comprehension] : comprehension intact [Reading] : reading intact [Past History] : adequate knowledge of personal past history [Cranial Nerves Optic (II)] : visual acuity intact bilaterally,  visual fields full to confrontation, pupils equal round and reactive to light [Cranial Nerves Oculomotor (III)] : extraocular motion intact [Cranial Nerves Trigeminal (V)] : facial sensation intact symmetrically [Cranial Nerves Facial (VII)] : face symmetrical [Cranial Nerves Vestibulocochlear (VIII)] : hearing was intact bilaterally [Cranial Nerves Glossopharyngeal (IX)] : tongue and palate midline [Cranial Nerves Accessory (XI - Cranial And Spinal)] : head turning and shoulder shrug symmetric [Cranial Nerves Hypoglossal (XII)] : there was no tongue deviation with protrusion [Motor Strength] : muscle strength was normal in all four extremities [No Muscle Atrophy] : normal bulk in all four extremities [Sensation Tactile Decrease] : light touch was intact [Balance] : balance was intact [Past-pointing] : there was no past-pointing [Tremor] : no tremor present [2+] : Ankle jerk left 2+ [Plantar Reflex Right Only] : normal on the right [Plantar Reflex Left Only] : normal on the left [FreeTextEntry4] : Newman 26/30 [Sclera] : the sclera and conjunctiva were normal [PERRL With Normal Accommodation] : pupils were equal in size, round, reactive to light, with normal accommodation [Extraocular Movements] : extraocular movements were intact [Outer Ear] : the ears and nose were normal in appearance [Oropharynx] : the oropharynx was normal [Neck Appearance] : the appearance of the neck was normal [Neck Cervical Mass (___cm)] : no neck mass was observed [Jugular Venous Distention Increased] : there was no jugular-venous distention [Thyroid Diffuse Enlargement] : the thyroid was not enlarged [Thyroid Nodule] : there were no palpable thyroid nodules [Auscultation Breath Sounds / Voice Sounds] : lungs were clear to auscultation bilaterally [Heart Rate And Rhythm] : heart rate was normal and rhythm regular [Heart Sounds] : normal S1 and S2 [Heart Sounds Gallop] : no gallops [Murmurs] : no murmurs [Heart Sounds Pericardial Friction Rub] : no pericardial rub [Full Pulse] : the pedal pulses are present [Edema] : there was no peripheral edema [Bowel Sounds] : normal bowel sounds [Abdomen Soft] : soft [Abdomen Tenderness] : non-tender [Abdomen Mass (___ Cm)] : no abdominal mass palpated [No CVA Tenderness] : no ~M costovertebral angle tenderness [No Spinal Tenderness] : no spinal tenderness [Abnormal Walk] : normal gait [Nail Clubbing] : no clubbing  or cyanosis of the fingernails [Musculoskeletal - Swelling] : no joint swelling seen [Motor Tone] : muscle strength and tone were normal [Skin Color & Pigmentation] : normal skin color and pigmentation [Skin Turgor] : normal skin turgor [] : no rash

## 2021-12-17 NOTE — DISCUSSION/SUMMARY
[FreeTextEntry1] : Skin 11-year-old patient with a history of Lyme disease and COVID infection last year coming for memory loss and forgetfulness with a nonfocal examination.\par Headaches and memory improved since last visit.\par Workup including MRI and EEG reported normal.\par No other workup is needed this time.\par Recommend continuing followup with you for other medical problems.\par If your patient feels in the changes in her symptoms we'll consider neuropsychological testing and other workup  in future.\par Continue vitamin supplements which patient feels helping her.\par Return for reevaluation in 6 months or as needed.\par Patient education provided to discussed with the patient.\par

## 2021-12-17 NOTE — ASSESSMENT
[FreeTextEntry1] : Patient examined and evaluated. Left hallux nail debrided and will be sent to rule out onychomycosis. Rx for lotrisone sent to pharmacy and patient to use bag balm. Discussed various treatment options for management of mycotic nails, including topical vs. oral vs. laser intervention. Pt to follow up in 2 weeks. Spent 30 minutes for patient care and medical decision making.\par

## 2021-12-19 PROBLEM — E66.9 OBESITY (BMI 30-39.9): Status: ACTIVE | Noted: 2018-08-02

## 2021-12-19 PROBLEM — G44.229 CHRONIC TENSION-TYPE HEADACHE, NOT INTRACTABLE: Status: ACTIVE | Noted: 2021-10-04

## 2021-12-19 PROBLEM — L40.50 PSORIATIC ARTHROPATHY: Status: ACTIVE | Noted: 2021-12-19

## 2021-12-19 PROBLEM — K58.1 IRRITABLE BOWEL SYNDROME WITH CONSTIPATION: Status: ACTIVE | Noted: 2018-08-02

## 2021-12-19 NOTE — REVIEW OF SYSTEMS
[Feeling Poorly] : feeling poorly [Feeling Tired] : feeling tired [Arthralgias] : arthralgias [Joint Pain] : joint pain [Joint Stiffness] : joint stiffness [As Noted in HPI] : as noted in HPI [Sleep Disturbances] : sleep disturbances [Anxiety] : anxiety [Depression] : depression [Negative] : Heme/Lymph [FreeTextEntry3] : denies  [de-identified] : no personal psoriasis [de-identified] : grieving over loss of mother..

## 2021-12-19 NOTE — HISTORY OF PRESENT ILLNESS
[FreeTextEntry1] : (Initial HPI 21)\par 60 yo diffuse arthralgias/ myalgias with cervical/ lumbar with c/o diffuse generalized OA.. \par s/p fall R foot fractures, L knee meniscal tear and b/l tears hips.\par Mother  this year.. since then progressively increased neck pain.. \par Thall minor.. \par Recently tx for lyme:  doxy x 21 with some improvement 80% better... \par Now w/ trigger finger R 3-4 th and recently active L side \par stiffness in hands/ shoulder stiffness can last all day.. intermittent .. \par steroid injections 3 courses in past 3 ys sometimes helpful.. recurrent bursitis.. feels better with activity.. \par lower back \par + Psoriasis- + son severe psoriasis..\par Recurrent eye inflammation \par Trouble sleeping can't find comfortable position.. \par Using CBD  to help ..stretching and moving does help\par Wt gain nearly 10 lbs \par Mother with OP \par \par ROS: \par + recurrent HA (MRI head pending)... for past few mnths, recent BRBPR.. thought to be hemorrhoids.. \par + IBS -C \par recent UTI but + response to antibiotics.. \par \par Labs:   nl CBC, CMP, neg lyme

## 2021-12-19 NOTE — ASSESSMENT
[FreeTextEntry1] : 60 yo diffuse arthralgias/ myalgias with cervical/ lumbar with c/o diffuse generalized OA for several years, Hx renal calculi, obesity, IBS, chronic HA, insomnia  talassemia minor w/ chronic anemia, recent lyme.  \par Grieving loss of mother... 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  \par Over years recurrent bursitis, tendinitis and labral tears, L knee meniscal tear.. trigger fingers and stiffness that can last for hours.  Always better with steroids repeatedly.    \par With strong FH and recurrent enthesopathies will consider tx for PsA... sans psoriasis.. \par Ordered labs and SI joint films. \par Describes "recurrent eye inflammation" but no w/u ... needs to see opth now to r/o inflammatory eye disease, spoke with opth will see pt next wk\par Will take 10 mg pred for next 2 wks waiting for results then consider tx options.  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 but unclear if this is r/t untreated inflammatory arthritis.  \par - Fatigue:  Trouble sleeping can't find comfortable position..Using CBD  to help ..stretching and moving does help\par - + recurrent HA (MRI head pending)... for past few mnths, recent BRBPR.. thought to be hemorrhoids.. \par - + IBS -C \par - Mood:  anxiety/ depression r/t loss mother but has had trouble managing mood lately... \par \par 3) Obesity:  BMI 33 w/ Wt gain nearly 10 lbs \par \par 4) Renal Calculi:  recent UTI but + response to antibiotics..\par \par Plan: \par - pred 10 mg daily x 2 wks, if dramatic improvement will ikely need DMARds. \par \par - SI joint xray\par \par - labs now\par \par - may need to address FM if overall not better with steroids.. pending response

## 2021-12-19 NOTE — PHYSICAL EXAM
[General Appearance - Alert] : alert [General Appearance - Well Nourished] : well nourished [General Appearance - Well Developed] : well developed [General Appearance - Well-Appearing] : healthy appearing [Sclera] : the sclera and conjunctiva were normal [PERRL With Normal Accommodation] : pupils were equal in size, round, and reactive to light [Extraocular Movements] : extraocular movements were intact [Outer Ear] : the ears and nose were normal in appearance [Oropharynx] : the oropharynx was normal [Neck Appearance] : the appearance of the neck was normal [Jugular Venous Distention Increased] : there was no jugular-venous distention [Neck Cervical Mass (___cm)] : no neck mass was observed [Thyroid Diffuse Enlargement] : the thyroid was not enlarged [Thyroid Nodule] : there were no palpable thyroid nodules [Auscultation Breath Sounds / Voice Sounds] : lungs were clear to auscultation bilaterally [Heart Rate And Rhythm] : heart rate was normal and rhythm regular [Heart Sounds] : normal S1 and S2 [Heart Sounds Gallop] : no gallops [Murmurs] : no murmurs [Heart Sounds Pericardial Friction Rub] : no pericardial rub [Edema] : there was no peripheral edema [Bowel Sounds] : normal bowel sounds [Abdomen Soft] : soft [Abdomen Tenderness] : non-tender [Abdomen Mass (___ Cm)] : no abdominal mass palpated [Cervical Lymph Nodes Enlarged Posterior Bilaterally] : posterior cervical [Cervical Lymph Nodes Enlarged Anterior Bilaterally] : anterior cervical [Supraclavicular Lymph Nodes Enlarged Bilaterally] : supraclavicular [No CVA Tenderness] : no ~M costovertebral angle tenderness [No Spinal Tenderness] : no spinal tenderness [Abnormal Walk] : normal gait [Nail Clubbing] : no clubbing  or cyanosis of the fingernails [Musculoskeletal - Swelling] : no joint swelling seen [Motor Tone] : muscle strength and tone were normal [] : no rash [Sensation] : the sensory exam was normal to light touch and pinprick [No Focal Deficits] : no focal deficits [Oriented To Time, Place, And Person] : oriented to person, place, and time [Impaired Insight] : insight and judgment were intact [Affect] : the affect was normal [FreeTextEntry1] : admits to grieving loss of mother, hasn't felt well since

## 2021-12-20 LAB — CYTOLOGY CVX/VAG DOC THIN PREP: ABNORMAL

## 2021-12-22 ENCOUNTER — APPOINTMENT (OUTPATIENT)
Dept: RHEUMATOLOGY | Facility: CLINIC | Age: 61
End: 2021-12-22
Payer: MEDICAID

## 2021-12-22 PROCEDURE — 99442: CPT

## 2021-12-30 RX ORDER — CICLOPIROX 80 MG/ML
8 SOLUTION TOPICAL
Qty: 1 | Refills: 3 | Status: COMPLETED | COMMUNITY
Start: 2021-12-30 | End: 2022-04-29

## 2021-12-30 RX ORDER — CLOTRIMAZOLE AND BETAMETHASONE DIPROPIONATE 10; .5 MG/G; MG/G
1-0.05 CREAM TOPICAL TWICE DAILY
Qty: 1 | Refills: 3 | Status: COMPLETED | COMMUNITY
Start: 2021-12-30 | End: 2022-04-29

## 2022-01-07 ENCOUNTER — APPOINTMENT (OUTPATIENT)
Dept: PODIATRY | Facility: CLINIC | Age: 62
End: 2022-01-07

## 2022-01-14 ENCOUNTER — APPOINTMENT (OUTPATIENT)
Dept: PODIATRY | Facility: CLINIC | Age: 62
End: 2022-01-14
Payer: MEDICAID

## 2022-01-29 ENCOUNTER — TRANSCRIPTION ENCOUNTER (OUTPATIENT)
Age: 62
End: 2022-01-29

## 2022-01-30 ENCOUNTER — TRANSCRIPTION ENCOUNTER (OUTPATIENT)
Age: 62
End: 2022-01-30

## 2022-02-04 ENCOUNTER — APPOINTMENT (OUTPATIENT)
Dept: PODIATRY | Facility: CLINIC | Age: 62
End: 2022-02-04

## 2022-02-18 ENCOUNTER — APPOINTMENT (OUTPATIENT)
Dept: RHEUMATOLOGY | Facility: CLINIC | Age: 62
End: 2022-02-18

## 2022-06-07 NOTE — ED ADULT NURSE NOTE - EXTENSIONS OF SELF_ADULT
88 y/o HM presents with Altered MS with HTN urgency    # ESRD  # Syncope/ Weakness  # Encephalopathy, now resolved  # HTN urgency  # RUQ pain with neg HIDA and CT of abd   # chronic systolic CHF with ICD  # Hypothyroidism    PLAN  - Thus far, all cultures are negative  - BP mpre stable, it appears pt is non compliant with his BP meds at home  - HD today, s/p IV contrast study    2/4  --ESRD  stable on TIW HD  --Syncope,  work up negative.  --HTN, BP control acceptable,  Would allow some increase in BP to avoid hypotensive episodes associated with HD.  --ABD, No pathology on imaging studies.    2/5   --ESRD : continue TIW hd, next tx in am  --SYNCOPE : no clear etiology.  all work up negative.  --HTN  monitor outpt compliance  --ABD Pain. : no specific pathology.  continue to monitor.  --D/c planning.    2/6 MK  - ESRD: tolerating hd with goal uf of 1.5 kg, epo on hold with restart when below 10.5  - HTN urgency due to medication compliance.  will continue with current regimen  - SYNCOPE/Near SYNCOPE: work- up negative  - no renal barrier for dc    2/7   --ESRD : continue TIW HD  --NEURO status stable  --HTN   BP control fair.  --D/c planning. None Yes

## 2022-06-17 ENCOUNTER — APPOINTMENT (OUTPATIENT)
Dept: NEUROLOGY | Facility: CLINIC | Age: 62
End: 2022-06-17

## 2022-09-23 ENCOUNTER — APPOINTMENT (OUTPATIENT)
Dept: ORTHOPEDIC SURGERY | Facility: CLINIC | Age: 62
End: 2022-09-23

## 2022-09-25 ENCOUNTER — FORM ENCOUNTER (OUTPATIENT)
Age: 62
End: 2022-09-25

## 2022-09-29 ENCOUNTER — APPOINTMENT (OUTPATIENT)
Dept: ORTHOPEDIC SURGERY | Facility: CLINIC | Age: 62
End: 2022-09-29

## 2022-09-29 VITALS — HEIGHT: 60 IN | BODY MASS INDEX: 31.41 KG/M2 | WEIGHT: 160 LBS

## 2022-09-29 DIAGNOSIS — S60.212A CONTUSION OF LEFT WRIST, INITIAL ENCOUNTER: ICD-10-CM

## 2022-09-29 PROCEDURE — 99213 OFFICE O/P EST LOW 20 MIN: CPT | Mod: 25

## 2022-09-29 PROCEDURE — 29125 APPL SHORT ARM SPLINT STATIC: CPT | Mod: LT

## 2022-09-29 NOTE — ADDENDUM
[FreeTextEntry1] : I, Lisa Pineda wrote this note acting as a scribe for Dr. Kody Duke on Sep 29, 2022.

## 2022-09-29 NOTE — END OF VISIT
[FreeTextEntry3] : All medical record entries made by the Scribe were at my,  Dr. Kody Duke MD., direction and personally dictated by me on 09/29/2022. I have personally reviewed the chart and agree that the record accurately reflects my personal performance of the history, physical exam, assessment and plan.

## 2022-09-29 NOTE — HISTORY OF PRESENT ILLNESS
[Right] : right hand dominant [FreeTextEntry1] : Pt is a 63 y/o female c/o left wrist pain.  She fell down 3 steps in [place] 2 weeks ago.  She landed on an outstretched left hand.  She had pain immediately.  The wrist became swollen and ecchymotic.  She did not want to go to the ED in [place].  She went to [facility] 4 days ago.  Xrays were taken and a splint was applied.  They were unsure if there was a fracture.  She was advised to follow up with a specialist.

## 2022-09-29 NOTE — PHYSICAL EXAM
[de-identified] : Patient is WDWN, alert, and in no acute distress. Breathing is unlabored. She is grossly oriented to person, place, and time.\par \par She is accompanied by her aunt today.\par \par Left Wrist:\par There is diffuse swelling noted dorsally to the wrist.\par No ecchymosis noted on exam today although she states she did initially have bruising after the injury occurred.\par Tenderness noted dorsally to the distal radius as well as at the left thumb CMC joint.\par No snuffbox tenderness. \par She has full wrist motion with pain into flexion and extension.\par Full digital ROM.\par Sensation is intact to the digits distally.\par  [de-identified] : Imaging obtained at [facility] on 9/25/22 of the LEFT hand were reviewed in the office today. 3 views of the left wrist revealed no obvious fractures at the distal radius or scaphoid.

## 2022-09-29 NOTE — DISCUSSION/SUMMARY
[FreeTextEntry1] : The underlying pathophysiology was reviewed with the patient. XR films were reviewed with the patient. Discussed at length the nature of the patient’s condition. The left wrist symptoms appear secondary to contusion versus hairline fracture of the distal radius.\par \par At this time, I told her that there are no definitive fractures upon my review of xrays obtained at [facility] on 9/25/22. We discussed treatment options of a cast or splint dependent upon her pain. She deferred a cast and was placed into a well molded fiber glass volar splint for support. I recommended she soak the hand in warm water and Epsom salts as needed and perform ROM exercises of the digits and wrist.\par \par All questions answered, understanding verbalized. Patient in agreement with plan of care. Follow up in 2 weeks for repeat xrays.

## 2022-10-20 ENCOUNTER — APPOINTMENT (OUTPATIENT)
Dept: ORTHOPEDIC SURGERY | Facility: CLINIC | Age: 62
End: 2022-10-20

## 2022-10-20 DIAGNOSIS — M25.532 PAIN IN LEFT WRIST: ICD-10-CM

## 2022-12-04 ENCOUNTER — FORM ENCOUNTER (OUTPATIENT)
Age: 62
End: 2022-12-04

## 2023-09-05 ENCOUNTER — NON-APPOINTMENT (OUTPATIENT)
Age: 63
End: 2023-09-05

## 2023-09-05 ENCOUNTER — APPOINTMENT (OUTPATIENT)
Dept: GASTROENTEROLOGY | Facility: CLINIC | Age: 63
End: 2023-09-05
Payer: MEDICAID

## 2023-09-05 VITALS — DIASTOLIC BLOOD PRESSURE: 84 MMHG | SYSTOLIC BLOOD PRESSURE: 126 MMHG | HEART RATE: 70 BPM

## 2023-09-05 VITALS — WEIGHT: 178 LBS | HEIGHT: 60 IN | BODY MASS INDEX: 34.95 KG/M2

## 2023-09-05 DIAGNOSIS — Z80.0 FAMILY HISTORY OF MALIGNANT NEOPLASM OF DIGESTIVE ORGANS: ICD-10-CM

## 2023-09-05 PROCEDURE — 99244 OFF/OP CNSLTJ NEW/EST MOD 40: CPT | Mod: 25

## 2023-09-05 PROCEDURE — 82274 ASSAY TEST FOR BLOOD FECAL: CPT | Mod: QW

## 2023-09-05 RX ORDER — ROSUVASTATIN CALCIUM 5 MG/1
5 TABLET, FILM COATED ORAL DAILY
Qty: 90 | Refills: 0 | Status: DISCONTINUED | COMMUNITY
End: 2023-09-05

## 2023-09-05 RX ORDER — PREDNISONE 5 MG/1
5 TABLET ORAL
Qty: 56 | Refills: 1 | Status: DISCONTINUED | COMMUNITY
Start: 2021-12-08 | End: 2023-09-05

## 2023-09-05 NOTE — ASSESSMENT
[FreeTextEntry1] : 1.  History of constipation, recent rectal bleeding; multiple polyps at last colonoscopy 2020; strong family history of colon cancer (brother, aunt) & colon polyps (mother)--rule out metachronous colorectal neoplasia.  Suspect bleeding is hemorrhoidal in origin. 2.  GERD, recent exacerbation; family history of stomach cancer (brother)--rule out smoldering irritation, Banerjee esophagus, hiatal hernia, neoplasm.  Atypical chest pain likely GI in origin, but given multiple risk factors, must assess for concomitant cardiac pathology. 3.  Obesity. 4.  Ex-smoker. 5.  Chronic anemia from thalassemia trait. 6.  History of pyelonephritis, recurrent UTIs. 7.  Hyperlipidemia. 8.  Prediabetes.   9.  Status post breast implants, T&A, left elbow surgery. 10. Allergic to sulfa, levofloxacin,  Advil.  Plan: 1.  Medical records, including latest labs, reviewed.  She will retrieve last colonoscopy/biopsy reports for my review. 2.  Cardiac clearance before elective anesthesia. 3.  Schedule colonoscopy, with EGD to follow--Procedures, rationale, anesthesia plan, and extended PEG prep instructions (clears x 48 hrs, 4 bisacodyl) were reviewed and brochures given. 4.  Possible PPI after EGD.

## 2023-09-05 NOTE — PHYSICAL EXAM
[Alert] : alert [No Acute Distress] : no acute distress [Well Developed] : well developed [Well Nourished] : well nourished [Obese (BMI >= 30)] : obese (BMI >= 30) [Normal] : heart rate was normal and rhythm regular, normal S1 and S2, no murmurs [None] : no edema [Bowel Sounds] : normal bowel sounds [Abdomen Tenderness] : non-tender [No Masses] : no abdominal mass palpated [Abdomen Soft] : soft [] : no hepatosplenomegaly [Occult Blood] : positive occult blood [FIT Test] : negative FIT test [de-identified] : internal hemorrhoids

## 2023-09-05 NOTE — REVIEW OF SYSTEMS
[Heartburn] : heartburn [Negative] : Heme/Lymph [Chest Pain] : chest pain [As Noted in HPI] : as noted in HPI [Constipation] : constipation [Bleeding] : bleeding

## 2023-09-05 NOTE — HISTORY OF PRESENT ILLNESS
[FreeTextEntry1] : Sanjuana returns for consideration of repeat panendoscopy.  Since , she has noted recurrent bright red blood per rectum, which she thought might have been related to drinking some alcohol and chicken wings earlier that day; she also had a very hard stool about 10 days ago.  At last colonoscopy  ([place]), five polyps were reportedly removed.  She voices concerns, as a brother  of aggressive colon cancer, and aunt had colon cancer, as well.  Another brother had stomach cancer, and her mother had colonic polyps.  For the past month, she has noted for more frequent GERD, now unable to tolerate wine.  She has had some pressure radiating into the neck, particularly when bloated.  She has been taking magnesium to help with her bowels.  She has used only OTC calcium product for the reflux.

## 2023-09-05 NOTE — CONSULT LETTER
[Dear  ___] : Dear  [unfilled], [Consult Letter:] : I had the pleasure of evaluating your patient, [unfilled]. [Please see my note below.] : Please see my note below. [Consult Closing:] : Thank you very much for allowing me to participate in the care of this patient.  If you have any questions, please do not hesitate to contact me. [Sincerely,] : Sincerely, [FreeTextEntry3] : Tl Carlton M.D.

## 2024-01-16 ENCOUNTER — APPOINTMENT (OUTPATIENT)
Dept: FAMILY MEDICINE | Facility: CLINIC | Age: 64
End: 2024-01-16
Payer: MEDICAID

## 2024-01-16 VITALS
TEMPERATURE: 97 F | OXYGEN SATURATION: 96 % | HEIGHT: 60 IN | SYSTOLIC BLOOD PRESSURE: 130 MMHG | WEIGHT: 178 LBS | HEART RATE: 88 BPM | BODY MASS INDEX: 34.95 KG/M2 | DIASTOLIC BLOOD PRESSURE: 88 MMHG

## 2024-01-16 DIAGNOSIS — Z12.39 ENCOUNTER FOR OTHER SCREENING FOR MALIGNANT NEOPLASM OF BREAST: ICD-10-CM

## 2024-01-16 DIAGNOSIS — R92.30 DENSE BREASTS, UNSPECIFIED: ICD-10-CM

## 2024-01-16 DIAGNOSIS — R53.83 OTHER FATIGUE: ICD-10-CM

## 2024-01-16 DIAGNOSIS — F41.9 ANXIETY DISORDER, UNSPECIFIED: ICD-10-CM

## 2024-01-16 DIAGNOSIS — M54.2 CERVICALGIA: ICD-10-CM

## 2024-01-16 PROCEDURE — 99214 OFFICE O/P EST MOD 30 MIN: CPT

## 2024-01-16 NOTE — PLAN
[FreeTextEntry1] : - mammo referral  - gyn referral - see dentist, derm  - schedule for PE  - heme did a work up for fatigue, all WNL - could be post viral fatigue - recommend grief counseling as still coping with brother's passing

## 2024-01-17 DIAGNOSIS — M65.331 TRIGGER FINGER, RIGHT MIDDLE FINGER: ICD-10-CM

## 2024-01-17 DIAGNOSIS — M65.332 TRIGGER FINGER, LEFT MIDDLE FINGER: ICD-10-CM

## 2024-01-29 ENCOUNTER — LABORATORY RESULT (OUTPATIENT)
Age: 64
End: 2024-01-29

## 2024-01-29 DIAGNOSIS — Z86.010 PERSONAL HISTORY OF COLONIC POLYPS: ICD-10-CM

## 2024-01-30 ENCOUNTER — APPOINTMENT (OUTPATIENT)
Dept: GASTROENTEROLOGY | Facility: CLINIC | Age: 64
End: 2024-01-30
Payer: MEDICAID

## 2024-01-30 VITALS
DIASTOLIC BLOOD PRESSURE: 74 MMHG | HEART RATE: 68 BPM | SYSTOLIC BLOOD PRESSURE: 120 MMHG | OXYGEN SATURATION: 93 % | RESPIRATION RATE: 16 BRPM

## 2024-01-30 DIAGNOSIS — Z80.0 ENCOUNTER FOR SCREENING FOR MALIGNANT NEOPLASM OF COLON: ICD-10-CM

## 2024-01-30 DIAGNOSIS — K21.9 GASTRO-ESOPHAGEAL REFLUX DISEASE W/OUT ESOPHAGITIS: ICD-10-CM

## 2024-01-30 DIAGNOSIS — K62.5 HEMORRHAGE OF ANUS AND RECTUM: ICD-10-CM

## 2024-01-30 DIAGNOSIS — Z12.11 ENCOUNTER FOR SCREENING FOR MALIGNANT NEOPLASM OF COLON: ICD-10-CM

## 2024-01-30 PROCEDURE — 43239 EGD BIOPSY SINGLE/MULTIPLE: CPT | Mod: 59

## 2024-01-30 PROCEDURE — 99212 OFFICE O/P EST SF 10 MIN: CPT | Mod: 25

## 2024-01-30 PROCEDURE — 45378 DIAGNOSTIC COLONOSCOPY: CPT

## 2024-01-30 NOTE — ASSESSMENT
[FreeTextEntry1] : 1. History of constipation, recent rectal bleeding; multiple polyps at last colonoscopy 2020; strong family history of colon cancer (brother, aunt) & colon polyps (mother)--rule out metachronous colorectal neoplasia. Suspect bleeding is hemorrhoidal in origin. 2. GERD, recent exacerbation; family history of stomach cancer (brother)--rule out smoldering irritation, Banerjee esophagus, hiatal hernia, neoplasm. Atypical chest pain likely GI in origin, but given multiple risk factors, must assess for concomitant cardiac pathology. 3. Obesity. 4. Ex-smoker. 5. Chronic anemia from thalassemia trait. 6. History of pyelonephritis, recurrent UTIs. 7. Hyperlipidemia. 8. Prediabetes. 9. Status post breast implants, T&A, left elbow surgery. 10. Allergic to sulfa, levofloxacin, Advil.  Recs: - Cardiology note reviewed. - Patient medically optimized, proceed to colonoscopy and EGD as previously scheduled.

## 2024-01-30 NOTE — PHYSICAL EXAM
[Alert] : alert [No Respiratory Distress] : no respiratory distress [Auscultation Breath Sounds / Voice Sounds] : lungs were clear to auscultation bilaterally [Heart Rate And Rhythm] : heart rate was normal and rhythm regular [Normal S1, S2] : normal S1 and S2 [Abdomen Tenderness] : non-tender [Abdomen Soft] : soft [No Focal Deficits] : no focal deficits [Oriented To Time, Place, And Person] : oriented to person, place, and time

## 2024-05-10 ENCOUNTER — APPOINTMENT (OUTPATIENT)
Dept: ORTHOPEDIC SURGERY | Facility: CLINIC | Age: 64
End: 2024-05-10
Payer: MEDICAID

## 2024-05-10 PROCEDURE — 72040 X-RAY EXAM NECK SPINE 2-3 VW: CPT

## 2024-05-10 PROCEDURE — 99214 OFFICE O/P EST MOD 30 MIN: CPT

## 2024-05-10 NOTE — DISCUSSION/SUMMARY
[de-identified] : Discussed further treatment options.  She is complaining of myelopathic symptoms with imbalance, hand and upper extremity weakness, and dropping objects.  I recommend a cervical MRI.  Follow-up afterwards.

## 2024-05-10 NOTE — HISTORY OF PRESENT ILLNESS
[de-identified] : Ms. BIN BISHOP  is a 63 year old female who presents with 3 months of neck and right arm pain without any specific cause or trauma.  She will use OTC medications and heat/ice for symptom control.  Normal bowel and bladder control.   Denies any recent fevers, chills, sweats, weight loss, or infection.  The patients past medical history, past surgical history, medications, allergies, and social history were reviewed by me today with the patient and documented accordingly.  In addition, the patient's family history, which is noncontributory to their visit, was also reviewed.

## 2024-05-24 ENCOUNTER — APPOINTMENT (OUTPATIENT)
Dept: ORTHOPEDIC SURGERY | Facility: CLINIC | Age: 64
End: 2024-05-24
Payer: MEDICAID

## 2024-05-24 DIAGNOSIS — M54.12 RADICULOPATHY, CERVICAL REGION: ICD-10-CM

## 2024-05-24 DIAGNOSIS — M48.02 SPINAL STENOSIS, CERVICAL REGION: ICD-10-CM

## 2024-05-24 PROCEDURE — 99214 OFFICE O/P EST MOD 30 MIN: CPT

## 2024-05-24 NOTE — PHYSICAL EXAM
[Ataxic] : ataxic [de-identified] : Examination of the cervical spine reveals no midline or paraspinal tenderness to palpation. No cervical lymphadenopathy. Decreased range of motion with respect to flexion, extension, rotation, and lateral bending. Negative Spurlings. Negative Lhermitte's. Full range of motion bilateral shoulders without evidence of impingement. No instability of bilateral upper extremities.  Cranial nerves II through XII grossly intact. Intact sensation bilateral upper extremities.  Grossly intact strength.. 1+ biceps triceps and brachioradialis reflexes. Negative Alcaraz's. 2+ radial pulse. Negative Tinel's over the cubital and carpal tunnel. No skin lesions on the right and left upper extremities.  Slow alternating hand movements [de-identified] : Cervical x-rays with spondylosis  Cervical MRI reveals some areas of stenosis.

## 2024-05-24 NOTE — DISCUSSION/SUMMARY
[de-identified] : We reviewed her MRI.  We discussed physical therapy, injections, and surgery.  This point she wished to continue with conservative measures with physical therapy.  She also see a hand surgeon regarding trigger fingers.  She will let me know of any changes or worsening of her symptoms.

## 2024-06-05 ENCOUNTER — APPOINTMENT (OUTPATIENT)
Dept: FAMILY MEDICINE | Facility: CLINIC | Age: 64
End: 2024-06-05
Payer: MEDICAID

## 2024-06-05 ENCOUNTER — NON-APPOINTMENT (OUTPATIENT)
Age: 64
End: 2024-06-05

## 2024-06-05 VITALS
OXYGEN SATURATION: 95 % | HEIGHT: 61 IN | DIASTOLIC BLOOD PRESSURE: 74 MMHG | TEMPERATURE: 98.4 F | WEIGHT: 178 LBS | SYSTOLIC BLOOD PRESSURE: 120 MMHG | HEART RATE: 79 BPM | BODY MASS INDEX: 33.61 KG/M2

## 2024-06-05 DIAGNOSIS — Z86.2 PERSONAL HISTORY OF DISEASES OF THE BLOOD AND BLOOD-FORMING ORGANS AND CERTAIN DISORDERS INVOLVING THE IMMUNE MECHANISM: ICD-10-CM

## 2024-06-05 DIAGNOSIS — Z00.00 ENCOUNTER FOR GENERAL ADULT MEDICAL EXAMINATION W/OUT ABNORMAL FINDINGS: ICD-10-CM

## 2024-06-05 DIAGNOSIS — E78.00 PURE HYPERCHOLESTEROLEMIA, UNSPECIFIED: ICD-10-CM

## 2024-06-05 PROCEDURE — 93000 ELECTROCARDIOGRAM COMPLETE: CPT

## 2024-06-05 PROCEDURE — 36415 COLL VENOUS BLD VENIPUNCTURE: CPT

## 2024-06-05 PROCEDURE — 99396 PREV VISIT EST AGE 40-64: CPT

## 2024-06-05 RX ORDER — EPINEPHRINE 0.3 MG/.3ML
0.3 INJECTION INTRAMUSCULAR
Qty: 2 | Refills: 0 | Status: DISCONTINUED | COMMUNITY
Start: 2021-01-26 | End: 2024-06-05

## 2024-06-05 RX ORDER — ROSUVASTATIN CALCIUM 10 MG/1
10 TABLET, FILM COATED ORAL
Refills: 0 | Status: DISCONTINUED | COMMUNITY
End: 2024-06-05

## 2024-06-05 RX ORDER — ONDANSETRON 8 MG/1
8 TABLET ORAL
Qty: 3 | Refills: 0 | Status: DISCONTINUED | COMMUNITY
Start: 2024-01-29 | End: 2024-06-05

## 2024-06-05 RX ORDER — ALBUTEROL SULFATE 90 UG/1
108 (90 BASE) AEROSOL, METERED RESPIRATORY (INHALATION)
Qty: 1 | Refills: 1 | Status: DISCONTINUED | COMMUNITY
Start: 2021-07-27 | End: 2024-06-05

## 2024-06-05 RX ORDER — POLYETHYLENE GLYCOL 3350, SODIUM CHLORIDE, SODIUM BICARBONATE AND POTASSIUM CHLORIDE WITH LEMON FLAVOR 420; 11.2; 5.72; 1.48 G/4L; G/4L; G/4L; G/4L
420 POWDER, FOR SOLUTION ORAL
Qty: 4000 | Refills: 0 | Status: DISCONTINUED | COMMUNITY
Start: 2023-09-05 | End: 2024-06-05

## 2024-06-05 NOTE — HISTORY OF PRESENT ILLNESS
[FreeTextEntry1] : CPE. [de-identified] : Patient is a 62yo female with PMH HLD, hypothyroidism, anxiety, IBS, OA, psoriatic arthritis, hx lyme disease (has residual blurry/double vision occasionally) who presents to the office for CPE.    Last CPE:  7/21/21, Sharmila Renee.  GYN Exam:  due, recommended today; 12/2021, Dr. Green; Pap negative.  Mammogram:  02/2023 at [facility], reportedly normal.    DEXA:  2021, reportedly normal at [facility]. Colonoscopy:  01/2024, Dr. Sow; redundant colon, internal hemorrhoids; rpt 5 years.  Ophthalmology:  2023. Dermatology:  due, recommended today. Dentist:  GEOVANNA. Shingles:  declines. Flu:  declines. Tdap:  about 5-6 years ago, declines further Tdap. COVID:  declines. STOP BANG negative.  Pt has pinched nerve in the neck, following with Dr. Mae.  Pt is starting PT tomorrow.  Pt formerly on Rosuvastatin, stopped a few years ago.

## 2024-06-05 NOTE — ASSESSMENT
[FreeTextEntry1] : Patient is a 62yo female with PMH HLD, hypothyroidism, anxiety, IBS, OA, psoriatic arthritis, hx lyme disease (has residual blurry/double vision occasionally) who presents to the office for CPE.  Dayday Maintenance - Due for skin check, Dermatology referral provided. - Due for GYN, recommended today.  Referral placed for new GYN. - Fasting labs drawn in office. - EKG performed in office NSR, no acute ST/T changes, no arrhythmias.  Poor R-wave progression.  Pt denies cardiac complaints.  Pt has seen Cardiology in the past but stopped following up during COVID, will give referral to new Cardiologist. - Eat plenty of fruits and vegetables, especially deeply colored fruits/vegetables (such as leafy greens, peaches) that are more nutrient-dense.  Continue to work hard on diet and exercise, limiting/avoiding saturated fat, fatty foods, greasy foods, red meats, white flour-based carbohydrates (cookies, cakes, white bread, white rice), and added sugars.  Chose whole grain foods and products made with whole grains over refined grains and white flour-based carbohydrates.  Avoid beverages and food with added sugar.  Limit salt intake to improve blood pressure.  Limit alcohol intake. - Try and incorporate 30 minutes of aerobic exercise to your daily routine.  HLD - Fasting labs drawn in office. - Stopped taking Rosuvastatin a few years ago, will wait for b/w to return to determine need for statin. - Limit/avoid greasy foods, fried foods, fatty foods, and saturated fat in your diet and try and eat more lean proteins.  Call the office or go to the ED immediately if you develop new, worsening or concerning symptoms including high fever, severe headache/worst headache of your life, confusion, dizziness/lightheadedness, loss of consciousness, severe chest pain, difficulty breathing, shortness of breath, severe abdominal pain, excessive vomiting/diarrhea, inability to feel/move the extremities, or any other concerning symptoms.

## 2024-06-05 NOTE — HEALTH RISK ASSESSMENT
[Yes] : Yes [Monthly or less (1 pt)] : Monthly or less (1 point) [1 or 2 (0 pts)] : 1 or 2 (0 points) [Never (0 pts)] : Never (0 points) [No] : In the past 12 months have you used drugs other than those required for medical reasons? No [No falls in past year] : Patient reported no falls in the past year [0] : 2) Feeling down, depressed, or hopeless: Not at all (0) [PHQ-2 Negative - No further assessment needed] : PHQ-2 Negative - No further assessment needed [Former] : Former [0-4] : 0-4 [> 15 Years] : > 15 Years [HIV test declined] : HIV test declined [Hepatitis C test declined] : Hepatitis C test declined [None] : None [Alone] : lives alone [Employed] : employed [Single] : single [# Of Children ___] : has [unfilled] children [Feels Safe at Home] : Feels safe at home [Fully functional (bathing, dressing, toileting, transferring, walking, feeding)] : Fully functional (bathing, dressing, toileting, transferring, walking, feeding) [Fully functional (using the telephone, shopping, preparing meals, housekeeping, doing laundry, using] : Fully functional and needs no help or supervision to perform IADLs (using the telephone, shopping, preparing meals, housekeeping, doing laundry, using transportation, managing medications and managing finances) [Patient/Caregiver unclear of wishes] : , patient/caregiver unclear of wishes [Audit-CScore] : 1 [de-identified] : exercises 2-3x/wk; walks 1-2 miles per day [de-identified] : well balanced and clean [LMX9Itiet] : 0 [EyeExamDate] : 2023 [Patient reported PAP Smear was normal] : Patient reported PAP Smear was normal [Patient reported bone density results were normal] : Patient reported bone density results were normal [Patient reported colonoscopy was normal] : Patient reported colonoscopy was normal [Change in mental status noted] : No change in mental status noted [Language] : denies difficulty with language [Sexually Active] : not sexually active [High Risk Behavior] : no high risk behavior [Reports changes in hearing] : Reports no changes in hearing [Reports changes in vision] : Reports no changes in vision [Reports changes in dental health] : Reports no changes in dental health [MammogramDate] : 02/2023 [PapSmearDate] : 12/2021 [BoneDensityDate] : 2021 [ColonoscopyDate] : 01/2024 [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

## 2024-06-06 ENCOUNTER — APPOINTMENT (OUTPATIENT)
Dept: CARDIOLOGY | Facility: CLINIC | Age: 64
End: 2024-06-06
Payer: MEDICAID

## 2024-06-06 VITALS
HEART RATE: 61 BPM | DIASTOLIC BLOOD PRESSURE: 84 MMHG | TEMPERATURE: 98.4 F | OXYGEN SATURATION: 96 % | SYSTOLIC BLOOD PRESSURE: 122 MMHG | BODY MASS INDEX: 33.61 KG/M2 | HEIGHT: 61 IN | WEIGHT: 178 LBS

## 2024-06-06 DIAGNOSIS — R06.02 SHORTNESS OF BREATH: ICD-10-CM

## 2024-06-06 PROCEDURE — 93000 ELECTROCARDIOGRAM COMPLETE: CPT

## 2024-06-06 PROCEDURE — 99204 OFFICE O/P NEW MOD 45 MIN: CPT | Mod: 25

## 2024-06-10 DIAGNOSIS — D56.3 THALASSEMIA MINOR: ICD-10-CM

## 2024-06-17 PROBLEM — D56.3 THALASSEMIA TRAIT: Status: ACTIVE | Noted: 2024-06-17

## 2024-06-17 NOTE — HISTORY OF PRESENT ILLNESS
[FreeTextEntry1] : Ms. Lopez is a 62 yo female with a hx HLD, NAFLD, psoriatic arthritis, OA, hx lyme disease presenting to establish care with cardiologist.   Pt was previously following with cardiology for HLD.   Reports recent exertional dyspnea, LE edema.  Also notes chest discomfort which she attributes to heart burn with associated right arm and neck pain.    Surgical hx : ankle surgery, arm  Tobacco use:  remote during college  Alcohol use: rare Drug use: none  Caffeine: one cup coffee daily  Family hx: father with MI age 40, ICD, mother with PAD, CAD age 90s  Ob hx: 2 pregnancies- no complications

## 2024-06-17 NOTE — REVIEW OF SYSTEMS
[Dyspnea on exertion] : dyspnea during exertion [Chest Discomfort] : chest discomfort [Lower Ext Edema] : lower extremity edema [Palpitations] : palpitations [Orthopnea] : no orthopnea [PND] : no PND [Syncope] : no syncope [Negative] : Heme/Lymph

## 2024-06-17 NOTE — ASSESSMENT
[FreeTextEntry1] : Atypical chest pain  Exertional dyspnea  risk factors for CAD including family hx  recent echo by prior cardiologist reportedly normal  will order coronary CT  will f/u in 1 year or sooner as needed based on above findings

## 2024-07-16 ENCOUNTER — APPOINTMENT (OUTPATIENT)
Dept: CT IMAGING | Facility: CLINIC | Age: 64
End: 2024-07-16

## 2024-08-08 ENCOUNTER — APPOINTMENT (OUTPATIENT)
Dept: OBGYN | Facility: CLINIC | Age: 64
End: 2024-08-08

## 2024-08-08 PROBLEM — M85.80 OSTEOPENIA, UNSPECIFIED LOCATION: Status: ACTIVE | Noted: 2024-08-08

## 2024-08-08 PROBLEM — Z82.49 FAMILY HISTORY OF CARDIAC DISORDER: Status: ACTIVE | Noted: 2024-08-08

## 2024-08-08 PROCEDURE — 99396 PREV VISIT EST AGE 40-64: CPT

## 2024-08-08 NOTE — HISTORY OF PRESENT ILLNESS
[TextBox_4] : Last exam 12/2021, no vb, takes vit d States needs to lose 35lbs., exercising every other day. Not SA currently but would like to be in the near future. [Mammogramdate] : 3/2024 [PapSmeardate] : 2021 [BoneDensityDate] : 2019 [TextBox_37] : osteopenia [ColonoscopyDate] : 1/2024 [TextBox_43] : fu 5 yrs [Previously active] : previously active [Men] : men [Vaginal] : vaginal

## 2024-08-08 NOTE — DISCUSSION/SUMMARY
[FreeTextEntry1] : lubricants recommended for sexual intercourse, if still has pain may call for vaginal estrogen.

## 2024-08-08 NOTE — PHYSICAL EXAM
[Appropriately responsive] : appropriately responsive [Alert] : alert [No Acute Distress] : no acute distress [Soft] : soft [Non-tender] : non-tender [Non-distended] : non-distended [No HSM] : No HSM [No Lesions] : no lesions [No Mass] : no mass [Oriented x3] : oriented x3 [] : implants [Examination Of The Breasts] : a normal appearance [No Masses] : no breast masses were palpable [Vulvar Atrophy] : vulvar atrophy [Labia Majora] : normal [Labia Minora] : normal [Atrophy] : atrophy [Normal] : normal [Tenderness] : nontender [Enlarged ___ wks] : not enlarged [Mass ___ cm] : no uterine mass was palpated [Uterine Adnexae] : non-palpable [FreeTextEntry5] : pap done [FreeTextEntry9] : deferred secondary to recent colonoscopy

## 2024-08-13 ENCOUNTER — APPOINTMENT (OUTPATIENT)
Dept: ORTHOPEDIC SURGERY | Facility: CLINIC | Age: 64
End: 2024-08-13

## 2024-08-27 NOTE — ED ADULT NURSE REASSESSMENT NOTE - NS ED NURSE REASSESS COMMENT FT1
[facility] [address] [address]  [phone]  Dept: [phone]  ______________________________________________________________________________    Patient: Long Mayfield   : 1966   MRN: [id number]   2024    INVASIVE PROCEDURE SAFETY CHECKLIST    Date: 2024   Procedure:Right knee aspiration  Patient Name: Long Mayfield  MRN: [id number]  YOB: 1966    Action: Complete sections as appropriate. Any discrepancy results in a HARD COPY until resolved.     PRE PROCEDURE:  Patient ID verified with 2 identifiers (name and  or MRN): Yes  Procedure and site verified with patient/designee (when able): Yes  Accurate consent documentation in medical record: Yes  H&P (or appropriate assessment) documented in medical record: NA  H&P must be up to 20 days prior to procedure and updates within 24 hours of procedure as applicable: NA  Relevant diagnostic and radiology test results appropriately labeled and displayed as applicable: Yes  Procedure site(s) marked with provider initials: NA    TIMEOUT:  Time-Out performed immediately prior to starting procedure, including verbal and active participation of all team members addressing the following:Yes  * Correct patient identify  * Confirmed that the correct side and site are marked  * An accurate procedure consent form  * Agreement on the procedure to be done  * Correct patient position  * Relevant images and results are properly labeled and appropriately displayed  * The need to administer antibiotics or fluids for irrigation purposes during the procedure as applicable   * Safety precautions based on patient history or medication use    DURING PROCEDURE: Verification of correct person, site, and procedures any time the responsibility for care of the patient is transferred to another member of the care team.       The following medications were given:  Attempted to call report on [facility], RN not available. Will attempt to call again Pt presents to the ER via EMS with c/o dizziness. Per EMS report pt has been dizzy since last night. Pt denies cp or sob. BS96.           Prior to injection, verified patient identity using patient's name and date of birth.  Due to injection administration, patient instructed to remain in clinic for 15 minutes  afterwards, and to report any adverse reaction to me immediately.    Aspiration was preformed       Scribed by Miracle Hobbs ATC for Tootie Bello PA-C on August 27, 2024 at 5:16a based on the provider's statements to me.     Miracle Hobbs ATC

## 2024-09-24 ENCOUNTER — APPOINTMENT (OUTPATIENT)
Dept: DERMATOLOGY | Facility: CLINIC | Age: 64
End: 2024-09-24
Payer: MEDICAID

## 2024-09-24 ENCOUNTER — NON-APPOINTMENT (OUTPATIENT)
Age: 64
End: 2024-09-24

## 2024-09-24 DIAGNOSIS — B35.3 TINEA PEDIS: ICD-10-CM

## 2024-09-24 DIAGNOSIS — Z12.83 ENCOUNTER FOR SCREENING FOR MALIGNANT NEOPLASM OF SKIN: ICD-10-CM

## 2024-09-24 DIAGNOSIS — D22.9 MELANOCYTIC NEVI, UNSPECIFIED: ICD-10-CM

## 2024-09-24 DIAGNOSIS — L81.4 OTHER MELANIN HYPERPIGMENTATION: ICD-10-CM

## 2024-09-24 DIAGNOSIS — L82.0 INFLAMED SEBORRHEIC KERATOSIS: ICD-10-CM

## 2024-09-24 PROCEDURE — 17110 DESTRUCTION B9 LES UP TO 14: CPT

## 2024-09-24 PROCEDURE — 99204 OFFICE O/P NEW MOD 45 MIN: CPT | Mod: 25

## 2024-09-24 RX ORDER — KETOCONAZOLE 20 MG/G
2 CREAM TOPICAL TWICE DAILY
Qty: 1 | Refills: 3 | Status: ACTIVE | COMMUNITY
Start: 2024-09-24 | End: 1900-01-01

## 2024-09-24 NOTE — HISTORY OF PRESENT ILLNESS
[FreeTextEntry1] : skin check [de-identified] : 64 year old. skin check. rash on feet. enlarging spot back.

## 2024-09-24 NOTE — ASSESSMENT
[FreeTextEntry1] : 1) skin check- -benign findings as above  2) tinea pedis keto cream PRN; SED  3) ISK The specified lesions were treated with liquid nitrogen cryotherapy.  Discussed risks including pain, blistering, crusting, discoloration, recurrence.

## 2024-09-24 NOTE — PHYSICAL EXAM
[FreeTextEntry3] : AAOx3, pleasant, NAD, no visual lymphadenopathy hair, scalp, face, nose, eyelids, ears, lips, oropharynx, neck, chest, abdomen, back, right arm, left arm, nails, and hands examined with all normal findings, pertinent findings include:  multiple benign nevi and lentigines + inflamed, waxy, keratotic papule on back scaling on feet

## 2024-11-25 NOTE — ED ADULT TRIAGE NOTE - WEIGHT IN LBS
Refill request received from pharmacy      Next Office Visit Date:  Future Appointments   Date Time Provider Department Center   11/26/2024  1:00 PM Rashmi Huston APRN - [facility]       [facility] - Please review via PDMP        Last Office Visit:    8/26/2024     160

## 2025-02-12 DIAGNOSIS — R11.0 NAUSEA: ICD-10-CM

## 2025-02-12 RX ORDER — ONDANSETRON 4 MG/1
4 TABLET ORAL
Qty: 21 | Refills: 0 | Status: ACTIVE | COMMUNITY
Start: 2025-02-12 | End: 1900-01-01

## 2025-05-07 NOTE — BEGINNING OF VISIT
[facility] Gynecologic Oncology  [address], [address], [address]  [place] [address]    GYNECOLOGIC ONCOLOGY    PATIENT NAME: Keaton Wong  MRN: [id number]  DATE: 05/07/25      TREATMENT SUMMARY:  THESE RECORDS HAVE BEEN REVIEWED, UNLESS OTHERWISE INDICATED     Recurrent and Progressive Stage IA, grade 3, Mixed carcinoma of the UTERUS   Follow up visit    DIAGNOSTIC STUDIES:  IMAGING EVALUATION:  CT A/P IV Contrast (11/05/24): Indication: Back Pain   Mixed lytic and sclerotic lesion in the T11 vertebral body, new from comparison.  This finding suspicious for an osseous metastasis.  No pathologic fracture is evident  MRI Pelvis With and Without Contrast (11/08/25)  Classic changes of avascular necrosis involving left femoral head  Abnormal enhancing marrow signal is present in the intertrochanteric right femur extending over a length of approximately 8 cm  No pathologic fracture    LAB EVALUATION:   (12/11/24): 17.3 > (02/14/25) 37.4 > (03/26/25) 50.8       HEALTH MAINTENANCE:     The following laboratory results were reviewed:    CBC (03/26/25): Hemoglobin 9.0 (L) > (02/14/25) 10.2 > (12/30/24) 11.8 > (12/11/25) > 11.0 > (11/20/24) 11.2   CMP  (03/26/25):  Glucose 118 (H). Normal Creatinine 0.69  TSH (11/01/23): <0.015 (L)   HgBA1C  (08/21/23): 5.1  Lipid panel (08/21/23): Normal      Gerda Ta MD  OBGYN Resident, PGY3  [place], [place]  5/7/2025 3:45 PM     [Patient] : patient

## 2025-06-05 ENCOUNTER — APPOINTMENT (OUTPATIENT)
Dept: OBGYN | Facility: CLINIC | Age: 65
End: 2025-06-05
Payer: MEDICAID

## 2025-06-05 VITALS
WEIGHT: 173 LBS | DIASTOLIC BLOOD PRESSURE: 70 MMHG | BODY MASS INDEX: 33.09 KG/M2 | HEIGHT: 60.5 IN | SYSTOLIC BLOOD PRESSURE: 118 MMHG

## 2025-06-05 DIAGNOSIS — N95.0 POSTMENOPAUSAL BLEEDING: ICD-10-CM

## 2025-06-05 DIAGNOSIS — Z11.3 ENCOUNTER FOR SCREENING FOR INFECTIONS WITH A PREDOMINANTLY SEXUAL MODE OF TRANSMISSION: ICD-10-CM

## 2025-06-05 DIAGNOSIS — N95.1 MENOPAUSAL AND FEMALE CLIMACTERIC STATES: ICD-10-CM

## 2025-06-05 LAB
APPEARANCE: CLEAR
BILIRUBIN URINE: NEGATIVE
BLOOD URINE: ABNORMAL
COLOR: YELLOW
GLUCOSE QUALITATIVE U: NEGATIVE
KETONES URINE: NEGATIVE
LEUKOCYTE ESTERASE URINE: NEGATIVE
NITRITE URINE: NEGATIVE
PH URINE: 5.5
PROTEIN URINE: NEGATIVE
SPECIFIC GRAVITY URINE: 1.01
UROBILINOGEN URINE: 0.2 (ref 0.2–?)

## 2025-06-05 PROCEDURE — 99214 OFFICE O/P EST MOD 30 MIN: CPT

## 2025-06-09 LAB
BV BACTERIA RRNA VAG QL NAA+PROBE: NOT DETECTED
C GLABRATA RNA VAG QL NAA+PROBE: NOT DETECTED
C TRACH RRNA SPEC QL NAA+PROBE: NOT DETECTED
CANDIDA RRNA VAG QL PROBE: NOT DETECTED
HBV SURFACE AG SER QL: NONREACTIVE
HCV AB SER QL: NONREACTIVE
HCV S/CO RATIO: 0.12 S/CO
HIV1+2 AB SPEC QL IA.RAPID: NONREACTIVE
N GONORRHOEA RRNA SPEC QL NAA+PROBE: NOT DETECTED
T PALLIDUM AB SER QL IA: NEGATIVE
T VAGINALIS RRNA SPEC QL NAA+PROBE: NOT DETECTED

## 2025-06-12 ENCOUNTER — RESULT REVIEW (OUTPATIENT)
Age: 65
End: 2025-06-12

## 2025-06-12 ENCOUNTER — OUTPATIENT (OUTPATIENT)
Dept: OUTPATIENT SERVICES | Facility: HOSPITAL | Age: 65
LOS: 1 days | End: 2025-06-12
Payer: MEDICAID

## 2025-06-12 ENCOUNTER — APPOINTMENT (OUTPATIENT)
Dept: CT IMAGING | Facility: CLINIC | Age: 65
End: 2025-06-12
Payer: MEDICAID

## 2025-06-12 ENCOUNTER — APPOINTMENT (OUTPATIENT)
Dept: ULTRASOUND IMAGING | Facility: CLINIC | Age: 65
End: 2025-06-12
Payer: MEDICAID

## 2025-06-12 DIAGNOSIS — N95.0 POSTMENOPAUSAL BLEEDING: ICD-10-CM

## 2025-06-12 DIAGNOSIS — N95.1 MENOPAUSAL AND FEMALE CLIMACTERIC STATES: ICD-10-CM

## 2025-06-12 DIAGNOSIS — Z98.890 OTHER SPECIFIED POSTPROCEDURAL STATES: Chronic | ICD-10-CM

## 2025-06-12 PROCEDURE — 74178 CT ABD&PLV WO CNTR FLWD CNTR: CPT | Mod: 26

## 2025-06-12 PROCEDURE — 76830 TRANSVAGINAL US NON-OB: CPT | Mod: 26

## 2025-06-12 PROCEDURE — 76830 TRANSVAGINAL US NON-OB: CPT

## 2025-06-12 PROCEDURE — 74178 CT ABD&PLV WO CNTR FLWD CNTR: CPT

## 2025-08-20 ENCOUNTER — RX RENEWAL (OUTPATIENT)
Age: 65
End: 2025-08-20